# Patient Record
Sex: MALE | Race: WHITE | HISPANIC OR LATINO | Employment: FULL TIME | URBAN - METROPOLITAN AREA
[De-identification: names, ages, dates, MRNs, and addresses within clinical notes are randomized per-mention and may not be internally consistent; named-entity substitution may affect disease eponyms.]

---

## 2017-11-07 ENCOUNTER — HOSPITAL ENCOUNTER (OUTPATIENT)
Dept: RADIOLOGY | Facility: HOSPITAL | Age: 42
Discharge: HOME | End: 2017-11-07
Attending: PHYSICIAN ASSISTANT
Payer: COMMERCIAL

## 2017-11-07 ENCOUNTER — PATIENT MESSAGE (OUTPATIENT)
Dept: INTERNAL MEDICINE | Facility: CLINIC | Age: 42
End: 2017-11-07

## 2017-11-07 ENCOUNTER — OFFICE VISIT (OUTPATIENT)
Dept: INTERNAL MEDICINE | Facility: CLINIC | Age: 42
End: 2017-11-07
Payer: COMMERCIAL

## 2017-11-07 VITALS
WEIGHT: 227 LBS | BODY MASS INDEX: 31.78 KG/M2 | TEMPERATURE: 102 F | SYSTOLIC BLOOD PRESSURE: 122 MMHG | HEART RATE: 78 BPM | DIASTOLIC BLOOD PRESSURE: 70 MMHG | HEIGHT: 71 IN

## 2017-11-07 DIAGNOSIS — J10.1 INFLUENZA B: ICD-10-CM

## 2017-11-07 DIAGNOSIS — R50.9 FEVER, UNSPECIFIED FEVER CAUSE: Primary | ICD-10-CM

## 2017-11-07 DIAGNOSIS — R50.9 FEVER, UNSPECIFIED FEVER CAUSE: ICD-10-CM

## 2017-11-07 DIAGNOSIS — R79.81 ABNORMAL PULSE OXIMETRY: ICD-10-CM

## 2017-11-07 DIAGNOSIS — R05.9 COUGH: ICD-10-CM

## 2017-11-07 LAB
CTP QC/QA: YES
FLUAV AG NPH QL: NEGATIVE
FLUBV AG NPH QL: POSITIVE

## 2017-11-07 PROCEDURE — 99999 PR PBB SHADOW E&M-EST. PATIENT-LVL III: CPT | Mod: PBBFAC,,, | Performed by: PHYSICIAN ASSISTANT

## 2017-11-07 PROCEDURE — 71020 XR CHEST PA AND LATERAL: CPT | Mod: TC,PO

## 2017-11-07 PROCEDURE — 99214 OFFICE O/P EST MOD 30 MIN: CPT | Mod: S$GLB,,, | Performed by: PHYSICIAN ASSISTANT

## 2017-11-07 PROCEDURE — 71020 XR CHEST PA AND LATERAL: CPT | Mod: 26,,, | Performed by: RADIOLOGY

## 2017-11-07 PROCEDURE — 87804 INFLUENZA ASSAY W/OPTIC: CPT | Mod: QW,S$GLB,, | Performed by: PHYSICIAN ASSISTANT

## 2017-11-07 RX ORDER — OSELTAMIVIR PHOSPHATE 75 MG/1
75 CAPSULE ORAL 2 TIMES DAILY
Qty: 10 CAPSULE | Refills: 0 | Status: SHIPPED | OUTPATIENT
Start: 2017-11-07 | End: 2017-11-12

## 2017-11-07 RX ORDER — AZITHROMYCIN 250 MG/1
TABLET, FILM COATED ORAL
Qty: 6 TABLET | Refills: 0 | Status: SHIPPED | OUTPATIENT
Start: 2017-11-07 | End: 2022-07-22 | Stop reason: ALTCHOICE

## 2017-11-07 RX ORDER — BENZONATATE 200 MG/1
200 CAPSULE ORAL 3 TIMES DAILY PRN
Qty: 30 CAPSULE | Refills: 0 | Status: SHIPPED | OUTPATIENT
Start: 2017-11-07 | End: 2017-11-17

## 2017-11-07 NOTE — LETTER
November 7, 2017               [facility]  [facility]  [id number] [place] [address]  Phone: [phone]  Fax: [phone]   November 7, 2017     Patient: Migue George   YOB: 1975   Date of Visit: 11/7/2017       To Whom it May Concern:    Migue George was seen in my clinic on 11/7/2017. He may return to work on 11/09/2017.    If you have any questions or concerns, please don't hesitate to call.    Sincerely,         Margie Leung LPN

## 2017-11-08 NOTE — PROGRESS NOTES
Subjective:       Patient ID: Migue George is a 42 y.o. male.    Chief Complaint:   Influenza   This is a new problem. The current episode started yesterday. The problem occurs constantly. The problem has been unchanged. Associated symptoms include chills, congestion, coughing, fatigue, a fever (started last night while at work ) and a sore throat. Pertinent negatives include no abdominal pain, anorexia, arthralgias, change in bowel habit, chest pain, diaphoresis, headaches, joint swelling, myalgias, nausea, neck pain, numbness, rash, swollen glands, urinary symptoms, vertigo, visual change, vomiting or weakness. Nothing aggravates the symptoms. He has tried nothing for the symptoms.       Past Medical History:   Diagnosis Date    Depression     Hyperlipidemia        Current Outpatient Prescriptions   Medication Sig Dispense Refill    fenofibrate 160 MG Tab Take 1 tablet (160 mg total) by mouth once daily. 30 tablet 0    FEXOFENADINE HCL (ALLEGRA ORAL) Take by mouth.      levothyroxine (SYNTHROID) 150 MCG tablet Take 150 mcg by mouth once daily.      levothyroxine (SYNTHROID, LEVOTHROID) 175 MCG tablet Take 175 mcg by mouth once daily.      acyclovir (ZOVIRAX) 200 MG capsule Take 2 capsules (400 mg total) by mouth 2 (two) times daily. For fever blisters 20 capsule 0    azithromycin (Z-SHEELA) 250 MG tablet As per packet instructions 6 tablet 0    benzonatate (TESSALON) 200 MG capsule Take 1 capsule (200 mg total) by mouth 3 (three) times daily as needed for Cough. 30 capsule 0    oseltamivir (TAMIFLU) 75 MG capsule Take 1 capsule (75 mg total) by mouth 2 (two) times daily. 10 capsule 0    venlafaxine (EFFEXOR-XR) 150 MG Cp24 Take 1 capsule (150 mg total) by mouth once daily. 30 capsule 11     No current facility-administered medications for this visit.        Review of Systems   Constitutional: Positive for chills, fatigue and fever (started last night while at work ). Negative for diaphoresis.  "  HENT: Positive for congestion and sore throat.    Respiratory: Positive for cough.    Cardiovascular: Negative for chest pain.   Gastrointestinal: Negative for abdominal pain, anorexia, change in bowel habit, nausea and vomiting.   Musculoskeletal: Negative for arthralgias, joint swelling, myalgias and neck pain.   Skin: Negative for rash.   Neurological: Negative for vertigo, weakness, numbness and headaches.       Objective:   /70   Pulse 78   Temp (!) 101.5 °F (38.6 °C)   Ht 5' 10.5" (1.791 m)   Wt 103 kg (227 lb)   BMI 32.11 kg/m²    pulse O2 94-95%  Physical Exam   Constitutional: He is oriented to person, place, and time. He appears well-developed and well-nourished. No distress.   HENT:   Head: Normocephalic and atraumatic.   Right Ear: Hearing, tympanic membrane, external ear and ear canal normal.   Left Ear: Hearing, tympanic membrane, external ear and ear canal normal.   Nose: Nose normal.   Mouth/Throat: Uvula is midline and mucous membranes are normal. Posterior oropharyngeal erythema present.   Eyes: Conjunctivae and EOM are normal. Pupils are equal, round, and reactive to light.   Neck: Normal range of motion. No thyromegaly present.   Cardiovascular: Normal rate, regular rhythm, normal heart sounds and intact distal pulses.    Pulmonary/Chest: Effort normal and breath sounds normal.   Lymphadenopathy:     He has no cervical adenopathy.   Neurological: He is alert and oriented to person, place, and time.         Lab Results   Component Value Date    WBC 4.98 08/12/2014    HGB 14.0 08/12/2014    HCT 40.6 08/12/2014     08/12/2014    CHOL 226 (H) 02/23/2015    TRIG 316 (H) 02/23/2015    HDL 41 02/23/2015    ALT 29 02/23/2015    AST 18 02/23/2015     02/23/2015    K 4.4 02/23/2015     02/23/2015    CREATININE 1.0 02/23/2015    BUN 15 02/23/2015    CO2 23 02/23/2015    TSH 1.757 02/23/2015    HGBA1C 5.4 08/12/2014       Assessment:       1. Fever, unspecified fever cause  "   2. Cough    3. Influenza B    4. Abnormal pulse oximetry        Plan:   Fever, unspecified fever cause  -     POCT Influenza A/B  -     X-Ray Chest PA And Lateral; Future; Expected date: 11/07/2017    Cough  -     benzonatate (TESSALON) 200 MG capsule; Take 1 capsule (200 mg total) by mouth 3 (three) times daily as needed for Cough.  Dispense: 30 capsule; Refill: 0  Influ-     oseltamivir (TAMIFLU) 75 MG capsule; Take 1 capsule (75 mg total) by mouth 2 (two) times daily.  Dispense: 10 capsule; Refill: 0enza B    Abnormal pulse oximetry    Other orders  -     benzonatate (TESSALON) 200 MG capsule; Take 1 capsule (200 mg total) by mouth 3 (three) times daily as needed for Cough.  Dispense: 30 capsule; Refill: 0    -     azithromycin (Z-SHEELA) 250 MG tablet; As per packet instructions  Dispense: 6 tablet; Refill: 0    not good inspriation on xray, pulse o2 a little low. Had FAINT line for influenza B so will treat however also want to cover early CAP   Follow up if no improvement

## 2017-11-09 ENCOUNTER — PATIENT MESSAGE (OUTPATIENT)
Dept: INTERNAL MEDICINE | Facility: CLINIC | Age: 42
End: 2017-11-09

## 2019-09-17 ENCOUNTER — HOSPITAL ENCOUNTER (OUTPATIENT)
Dept: RADIOLOGY | Facility: HOSPITAL | Age: 44
Discharge: HOME | End: 2019-09-17
Attending: PHYSICIAN ASSISTANT
Payer: COMMERCIAL

## 2019-09-17 ENCOUNTER — OFFICE VISIT (OUTPATIENT)
Dept: URGENT CARE | Facility: CLINIC | Age: 44
End: 2019-09-17
Payer: COMMERCIAL

## 2019-09-17 VITALS
OXYGEN SATURATION: 98 % | RESPIRATION RATE: 18 BRPM | SYSTOLIC BLOOD PRESSURE: 140 MMHG | DIASTOLIC BLOOD PRESSURE: 74 MMHG | BODY MASS INDEX: 33.6 KG/M2 | HEART RATE: 70 BPM | TEMPERATURE: 98 F | HEIGHT: 71 IN | WEIGHT: 240 LBS

## 2019-09-17 DIAGNOSIS — R07.81 RIB PAIN ON LEFT SIDE: ICD-10-CM

## 2019-09-17 DIAGNOSIS — R10.9 FLANK PAIN: ICD-10-CM

## 2019-09-17 DIAGNOSIS — R07.81 RIB PAIN ON LEFT SIDE: Primary | ICD-10-CM

## 2019-09-17 LAB
BILIRUB SERPL-MCNC: NORMAL MG/DL
BLOOD URINE, POC: NORMAL
COLOR, POC UA: YELLOW
GLUCOSE UR QL STRIP: NORMAL
KETONES UR QL STRIP: NORMAL
LEUKOCYTE ESTERASE URINE, POC: NORMAL
NITRITE, POC UA: NORMAL
PH, POC UA: 5
PROTEIN, POC: NORMAL
SPECIFIC GRAVITY, POC UA: 1.02
UROBILINOGEN, POC UA: NORMAL

## 2019-09-17 PROCEDURE — 71100 X-RAY EXAM RIBS UNI 2 VIEWS: CPT | Mod: TC,FY,PO,LT

## 2019-09-17 PROCEDURE — 99214 PR OFFICE/OUTPT VISIT, EST, LEVL IV, 30-39 MIN: ICD-10-PCS | Mod: S$GLB,,, | Performed by: PHYSICIAN ASSISTANT

## 2019-09-17 PROCEDURE — 99999 PR PBB SHADOW E&M-EST. PATIENT-LVL V: ICD-10-PCS | Mod: PBBFAC,,, | Performed by: PHYSICIAN ASSISTANT

## 2019-09-17 PROCEDURE — 81001 POCT URINALYSIS, DIPSTICK OR TABLET REAGENT, AUTOMATED, WITH MICROSCOP: ICD-10-PCS | Mod: S$GLB,,, | Performed by: PHYSICIAN ASSISTANT

## 2019-09-17 PROCEDURE — 81001 URINALYSIS AUTO W/SCOPE: CPT | Mod: S$GLB,,, | Performed by: PHYSICIAN ASSISTANT

## 2019-09-17 PROCEDURE — 3078F DIAST BP <80 MM HG: CPT | Mod: CPTII,S$GLB,, | Performed by: PHYSICIAN ASSISTANT

## 2019-09-17 PROCEDURE — 3008F BODY MASS INDEX DOCD: CPT | Mod: CPTII,S$GLB,, | Performed by: PHYSICIAN ASSISTANT

## 2019-09-17 PROCEDURE — 3077F SYST BP >= 140 MM HG: CPT | Mod: CPTII,S$GLB,, | Performed by: PHYSICIAN ASSISTANT

## 2019-09-17 PROCEDURE — 99999 PR PBB SHADOW E&M-EST. PATIENT-LVL V: CPT | Mod: PBBFAC,,, | Performed by: PHYSICIAN ASSISTANT

## 2019-09-17 PROCEDURE — 99214 OFFICE O/P EST MOD 30 MIN: CPT | Mod: S$GLB,,, | Performed by: PHYSICIAN ASSISTANT

## 2019-09-17 PROCEDURE — 71100 XR RIBS 2 VIEW LEFT: ICD-10-PCS | Mod: 26,LT,, | Performed by: RADIOLOGY

## 2019-09-17 PROCEDURE — 74018 RADEX ABDOMEN 1 VIEW: CPT | Mod: TC,FY,PO

## 2019-09-17 PROCEDURE — 3078F PR MOST RECENT DIASTOLIC BLOOD PRESSURE < 80 MM HG: ICD-10-PCS | Mod: CPTII,S$GLB,, | Performed by: PHYSICIAN ASSISTANT

## 2019-09-17 PROCEDURE — 74018 XR ABDOMEN AP 1 VIEW: ICD-10-PCS | Mod: 26,,, | Performed by: RADIOLOGY

## 2019-09-17 PROCEDURE — 3008F PR BODY MASS INDEX (BMI) DOCUMENTED: ICD-10-PCS | Mod: CPTII,S$GLB,, | Performed by: PHYSICIAN ASSISTANT

## 2019-09-17 PROCEDURE — 3077F PR MOST RECENT SYSTOLIC BLOOD PRESSURE >= 140 MM HG: ICD-10-PCS | Mod: CPTII,S$GLB,, | Performed by: PHYSICIAN ASSISTANT

## 2019-09-17 PROCEDURE — 71100 X-RAY EXAM RIBS UNI 2 VIEWS: CPT | Mod: 26,LT,, | Performed by: RADIOLOGY

## 2019-09-17 PROCEDURE — 74018 RADEX ABDOMEN 1 VIEW: CPT | Mod: 26,,, | Performed by: RADIOLOGY

## 2019-09-17 NOTE — PATIENT INSTRUCTIONS
Recommended rest (no heavy lifting), tylenol 1000 mg/ibuprofen 600 mg for pain, ice   Increase fiber, fluids, miralax/stool softener for constipation   Follow-up with primary care physician if no improvement in symptoms in next week or if develop new/worsening symptoms

## 2019-09-17 NOTE — PROGRESS NOTES
"Subjective:      Patient ID: Migue George is a 44 y.o. male.    Chief Complaint: left rib pain (3-4 days )    Migue is a 44 year old male who presents to urgent care with left sided rib pain that began a few days ago.  The pain is worse with coughing or taking deep breaths.  It is also worst with movement.  He has taken over the counter tylenol/naproxen for the pain.  He has recently has some sinus pressure/congestion, but denies much of a cough.  He does not recall an injury or pulling a muscle.  He has no history of kidney stones and has not seen any blood in his urine.      Review of Systems   Constitutional: Negative for chills, diaphoresis, fatigue and fever.   Respiratory: Negative for cough, shortness of breath and wheezing.    Gastrointestinal: Negative for nausea and vomiting.   Genitourinary: Negative for dysuria, flank pain, frequency, hematuria and urgency.   Musculoskeletal:        Left sided rib pain/upper left sided thoracic pain        Objective:   BP (!) 140/74   Pulse 70   Temp 98.4 °F (36.9 °C) (Tympanic)   Resp 18   Ht 5' 11" (1.803 m)   Wt 108.9 kg (240 lb)   SpO2 98%   BMI 33.47 kg/m²   Physical Exam   Constitutional: He is oriented to person, place, and time. He appears well-developed and well-nourished. No distress.   HENT:   Head: Normocephalic and atraumatic.   Right Ear: External ear normal.   Left Ear: External ear normal.   Eyes: Conjunctivae and EOM are normal.   Neck: Normal range of motion.   Cardiovascular: Normal rate.   Pulmonary/Chest: Effort normal and breath sounds normal. No respiratory distress. He has no decreased breath sounds. He has no wheezes. He has no rhonchi.           Left sided upper back tenderness/rib tenderness    Musculoskeletal: Normal range of motion.   Neurological: He is alert and oriented to person, place, and time. He exhibits normal muscle tone.   Skin: Skin is warm and dry. He is not diaphoretic.   Psychiatric: He has a normal mood and " affect. His behavior is normal.   Vitals reviewed.    Assessment:      1. Rib pain on left side    2. Flank pain       Plan:   Rib pain on left side  -     X-Ray Ribs 2 View Left; Future; Expected date: 09/17/2019    Flank pain  -     POCT urinalysis, dipstick or tablet reag  -     Cancel: X-Ray Abdomen Flat And Erect; Future; Expected date: 09/17/2019    Left Sided Rib Pain    -  Rib x-ray - no acute fracture/dislocation    -  Urinalysis with no blood and KUB with no evidence of kidney stone - but stool burden    -  Recommended rest (no heavy lifting), tylenol 1000 mg/ibuprofen 600 mg for pain, ice for left rib pain   -  Increase fiber, fluids, miralax/stool softener for constipation    -  Follow-up with primary care physician if no improvement in symptoms in next week or if develop new/worsening symptoms     AVS provided and instructions reviewed with patient. Patient was counseled on supportive care and instructed to return or contact primary care provider if condition does not improve or for any new or worsening symptoms.    Silvana Silva PA-C   Physician Assistant   [facility]

## 2020-05-18 ENCOUNTER — HOSPITAL ENCOUNTER (OUTPATIENT)
Dept: RADIOLOGY | Facility: CLINIC | Age: 45
Discharge: HOME | End: 2020-05-18
Attending: NURSE PRACTITIONER
Payer: COMMERCIAL

## 2020-05-18 ENCOUNTER — OFFICE VISIT (OUTPATIENT)
Dept: URGENT CARE | Facility: CLINIC | Age: 45
End: 2020-05-18
Payer: COMMERCIAL

## 2020-05-18 VITALS
WEIGHT: 240 LBS | BODY MASS INDEX: 33.6 KG/M2 | HEART RATE: 95 BPM | TEMPERATURE: 99 F | RESPIRATION RATE: 20 BRPM | OXYGEN SATURATION: 97 % | HEIGHT: 71 IN

## 2020-05-18 DIAGNOSIS — R10.32 ACUTE LEFT LOWER QUADRANT PAIN: ICD-10-CM

## 2020-05-18 DIAGNOSIS — R10.32 ACUTE LEFT LOWER QUADRANT PAIN: Primary | ICD-10-CM

## 2020-05-18 DIAGNOSIS — Z87.19 HISTORY OF DIVERTICULITIS: ICD-10-CM

## 2020-05-18 DIAGNOSIS — R11.0 NAUSEA: ICD-10-CM

## 2020-05-18 PROCEDURE — 74019 RADEX ABDOMEN 2 VIEWS: CPT | Mod: S$GLB,,, | Performed by: RADIOLOGY

## 2020-05-18 PROCEDURE — 74019 XR ABDOMEN FLAT AND ERECT: ICD-10-PCS | Mod: S$GLB,,, | Performed by: RADIOLOGY

## 2020-05-18 PROCEDURE — 99214 PR OFFICE/OUTPT VISIT, EST, LEVL IV, 30-39 MIN: ICD-10-PCS | Mod: S$GLB,,, | Performed by: NURSE PRACTITIONER

## 2020-05-18 PROCEDURE — 99214 OFFICE O/P EST MOD 30 MIN: CPT | Mod: S$GLB,,, | Performed by: NURSE PRACTITIONER

## 2020-05-18 RX ORDER — AMOXICILLIN AND CLAVULANATE POTASSIUM 875; 125 MG/1; MG/1
1 TABLET, FILM COATED ORAL 2 TIMES DAILY
Qty: 14 TABLET | Refills: 0 | Status: SHIPPED | OUTPATIENT
Start: 2020-05-18 | End: 2020-05-25

## 2020-05-18 RX ORDER — METFORMIN HYDROCHLORIDE 500 MG/1
TABLET ORAL
COMMUNITY
Start: 2020-03-17 | End: 2022-07-22 | Stop reason: ALTCHOICE

## 2020-05-18 RX ORDER — ONDANSETRON 8 MG/1
8 TABLET, ORALLY DISINTEGRATING ORAL EVERY 8 HOURS PRN
Qty: 12 TABLET | Refills: 0 | Status: SHIPPED | OUTPATIENT
Start: 2020-05-18 | End: 2020-05-22

## 2020-05-18 NOTE — PATIENT INSTRUCTIONS
Increase fluid intake.  Take antibiotics and nausea medicine as prescribed.  Increase your fiber intake.  Follow up with your primary care physician if symptoms persist or worsen.    Understanding Diverticulosis and Diverticulitis     Pouches or diverticula usually occur in the lower part of the colon called the sigmoid.     The colon (large intestine) is the last part of the digestive tract. It absorbs water from stool and changes it from a liquid to a solid. In certain cases, small pouches called diverticula can form in the colon wall. This condition is called diverticulosis. The pouches can become infected. If this happens, it becomes a more serious problem called diverticulitis. These problems can be painful. But they can be managed.  Managing your condition  Diet changes or medicines may be prescribed.   If you have diverticulosis  Recommendations include:  · Diet changes are often enough to control symptoms. The main changes are adding fiber (roughage) and drinking more water. Fiber absorbs water as it travels through your colon. This helps your stool stay soft and move smoothly. Water helps this process.  · If needed, you may be told to take over-the-counter stool softeners.  · To help relieve pain, antispasmodic medicines may be prescribed.  · Watch for changes in your bowel movements. Tell the healthcare provider if you notice any changes.  · Begin an exercise program. Ask your healthcare provider how to get started.  · Get plenty of rest and sleep.   If you have diverticulitis  Treatment depends on how bad your symptoms are.  · For mild symptoms. You may be put on a liquid diet for a short time. Antibiotics are usually prescribed. If these two steps relieve your symptoms, you may then be prescribed a high-fiber diet. If you still have symptoms, your healthcare provider will discuss more treatment choices with you.  · For severe symptoms. You may need to be admitted to the hospital. There, you can be  given IV antibiotics and fluids. You will also be put on a low-fiber or liquid diet. Although not common, surgery is needed in some people with severe symptoms.  [place] to colon health     Diverticulitis occurs when the pouches become infected or inflamed.     Help keep your colon healthy with a diet that includes plenty of high-fiber fruits, vegetables, and whole grains. Drink plenty of liquids like water and juice. Maintain a healthy lifestyle including regular exercise, stress management, and adequate rest and sleep.   Date Last Reviewed: 7/1/2016  © 1359-7486 [employer]. [address] [place] [id number]. All rights reserved. This information is not intended as a substitute for professional medical care. Always follow your healthcare professional's instructions.

## 2020-05-18 NOTE — PROGRESS NOTES
"Subjective:       Patient ID: Migue George is a 44 y.o. male.    Vitals:  height is 5' 11" (1.803 m) and weight is 108.9 kg (240 lb). His temperature is 98.9 °F (37.2 °C). His pulse is 95. His respiration is 20 and oxygen saturation is 97%.     Chief Complaint: Abdominal Pain    Patient presented with c/o left lower abdominal pain for the past 4-5 days that has progressively gotten worse. Patient states having diarrhea 4 days ago and episodes of hard stool and feeling constipated for the past 3 days. Denies blood and mucous in the stool, fever, and vomiting. Admits having occasional nausea. Pt states he has a history of diverticulitis.    Abdominal Pain   This is a new problem. The current episode started in the past 7 days (Saturday Night). The onset quality is sudden. The problem occurs intermittently. The problem has been gradually worsening. The pain is located in the left flank. The pain is at a severity of 8/10. The pain is moderate. The quality of the pain is cramping and aching. Associated symptoms include belching, flatus and nausea. Pertinent negatives include no constipation, diarrhea, dysuria, fever or vomiting. The pain is aggravated by palpation, movement, coughing, deep breathing and belching. The pain is relieved by being still, certain positions and recumbency. He has tried acetaminophen for the symptoms. The treatment provided mild relief. There is no history of abdominal surgery.       Constitution: Negative for appetite change, chills, sweating and fever.   Cardiovascular: Negative for chest pain.   Respiratory: Negative for shortness of breath.    Gastrointestinal: Positive for abdominal pain and nausea. Negative for abdominal trauma, abdominal bloating, history of abdominal surgery, vomiting, constipation, diarrhea, dark colored stools and heartburn.   Genitourinary: Negative for dysuria and pelvic pain.   Musculoskeletal: Negative for back pain.       Objective:      Physical Exam "   Constitutional: He is oriented to person, place, and time. He appears well-developed and well-nourished. He is cooperative.   HENT:   Head: Normocephalic and atraumatic.   Right Ear: External ear normal.   Left Ear: External ear normal.   Nose: Nose normal.   Mouth/Throat: Mucous membranes are normal.   Eyes: Conjunctivae and lids are normal.   Neck: Trachea normal and full passive range of motion without pain. Neck supple.   Cardiovascular: Normal rate.   Pulmonary/Chest: Effort normal. No respiratory distress.   Abdominal: Soft. Normal appearance and bowel sounds are normal. He exhibits no distension, no abdominal bruit, no pulsatile midline mass and no mass. There is tenderness in the left lower quadrant. There is no rigidity.   Musculoskeletal: Normal range of motion. He exhibits no edema.   Neurological: He is alert and oriented to person, place, and time. He has normal strength.   Skin: Skin is warm, dry, intact, not diaphoretic and not pale.   Psychiatric: He has a normal mood and affect. His speech is normal and behavior is normal. Judgment and thought content normal. Cognition and memory are normal.   Nursing note and vitals reviewed.        Assessment:       1. Acute left lower quadrant pain    2. History of diverticulitis        Plan:         Acute left lower quadrant pain  -     X-Ray Abdomen Flat And Erect; Future; Expected date: 05/18/2020    History of diverticulitis  -     X-Ray Abdomen Flat And Erect; Future; Expected date: 05/18/2020         X-ray Abdomen Flat And Erect    Result Date: 5/18/2020  EXAMINATION: XR ABDOMEN FLAT AND ERECT CLINICAL HISTORY: Left lower quadrant pain TECHNIQUE: Flat and erect AP views of the abdomen were performed. COMPARISON: September 17, 2019 FINDINGS: The intestinal gas pattern is normal.  The right hemidiaphragm is slightly beyond the field-of-view of the upright image, but there is no obvious free air.  No small bowel air-fluid levels are identified.  There are  surgical clips overlying the left iliac bone as previously.  Visualized osseous structures are stable.  No definite abnormal calcifications are seen.  There may be some abnormal opacification at the lung bases, but this would be better evaluated on chest radiographs if clinically indicated.     As above. Electronically signed by: Nakia Ortega MD Date:    05/18/2020 Time:    18:29    Xray results reviewed and discussed with patient.    Increase fluid intake.  Take antibiotics and nausea medicine as prescribed.  Increase your fiber intake.  Follow up with your primary care physician if symptoms persist or worsen.

## 2020-05-20 ENCOUNTER — TELEPHONE (OUTPATIENT)
Dept: URGENT CARE | Facility: CLINIC | Age: 45
End: 2020-05-20

## 2021-07-09 ENCOUNTER — OFFICE VISIT (OUTPATIENT)
Dept: URGENT CARE | Facility: CLINIC | Age: 46
End: 2021-07-09
Payer: COMMERCIAL

## 2021-07-09 ENCOUNTER — NURSE TRIAGE (OUTPATIENT)
Dept: ADMINISTRATIVE | Facility: CLINIC | Age: 46
End: 2021-07-09

## 2021-07-09 ENCOUNTER — TELEPHONE (OUTPATIENT)
Dept: INTERNAL MEDICINE | Facility: CLINIC | Age: 46
End: 2021-07-09

## 2021-07-09 ENCOUNTER — PATIENT MESSAGE (OUTPATIENT)
Dept: ADMINISTRATIVE | Facility: CLINIC | Age: 46
End: 2021-07-09

## 2021-07-09 VITALS
SYSTOLIC BLOOD PRESSURE: 115 MMHG | TEMPERATURE: 98 F | HEIGHT: 71 IN | DIASTOLIC BLOOD PRESSURE: 66 MMHG | HEART RATE: 96 BPM | BODY MASS INDEX: 30.8 KG/M2 | OXYGEN SATURATION: 96 % | WEIGHT: 220 LBS

## 2021-07-09 DIAGNOSIS — U07.1 COVID-19 VIRUS INFECTION: Primary | ICD-10-CM

## 2021-07-09 DIAGNOSIS — R05.9 COUGH: ICD-10-CM

## 2021-07-09 LAB
CTP QC/QA: YES
SARS-COV-2 RDRP RESP QL NAA+PROBE: POSITIVE

## 2021-07-09 PROCEDURE — 3008F BODY MASS INDEX DOCD: CPT | Mod: CPTII,S$GLB,, | Performed by: PHYSICIAN ASSISTANT

## 2021-07-09 PROCEDURE — 3008F PR BODY MASS INDEX (BMI) DOCUMENTED: ICD-10-PCS | Mod: CPTII,S$GLB,, | Performed by: PHYSICIAN ASSISTANT

## 2021-07-09 PROCEDURE — 1126F PR PAIN SEVERITY QUANTIFIED, NO PAIN PRESENT: ICD-10-PCS | Mod: S$GLB,,, | Performed by: PHYSICIAN ASSISTANT

## 2021-07-09 PROCEDURE — 99214 PR OFFICE/OUTPT VISIT, EST, LEVL IV, 30-39 MIN: ICD-10-PCS | Mod: S$GLB,,, | Performed by: PHYSICIAN ASSISTANT

## 2021-07-09 PROCEDURE — 99214 OFFICE O/P EST MOD 30 MIN: CPT | Mod: S$GLB,,, | Performed by: PHYSICIAN ASSISTANT

## 2021-07-09 PROCEDURE — U0002: ICD-10-PCS | Mod: QW,S$GLB,, | Performed by: PHYSICIAN ASSISTANT

## 2021-07-09 PROCEDURE — 1126F AMNT PAIN NOTED NONE PRSNT: CPT | Mod: S$GLB,,, | Performed by: PHYSICIAN ASSISTANT

## 2021-07-09 PROCEDURE — U0002 COVID-19 LAB TEST NON-CDC: HCPCS | Mod: QW,S$GLB,, | Performed by: PHYSICIAN ASSISTANT

## 2021-07-09 RX ORDER — ATORVASTATIN CALCIUM 20 MG/1
TABLET, FILM COATED ORAL
COMMUNITY
Start: 2021-03-19 | End: 2022-07-22 | Stop reason: SDUPTHER

## 2021-07-10 ENCOUNTER — NURSE TRIAGE (OUTPATIENT)
Dept: ADMINISTRATIVE | Facility: CLINIC | Age: 46
End: 2021-07-10

## 2021-07-10 ENCOUNTER — PATIENT MESSAGE (OUTPATIENT)
Dept: INTERNAL MEDICINE | Facility: CLINIC | Age: 46
End: 2021-07-10

## 2021-07-11 ENCOUNTER — NURSE TRIAGE (OUTPATIENT)
Dept: ADMINISTRATIVE | Facility: CLINIC | Age: 46
End: 2021-07-11

## 2021-07-11 ENCOUNTER — TELEPHONE (OUTPATIENT)
Dept: INFECTIOUS DISEASES | Facility: HOSPITAL | Age: 46
End: 2021-07-11

## 2021-07-11 ENCOUNTER — PATIENT MESSAGE (OUTPATIENT)
Dept: INFECTIOUS DISEASES | Facility: HOSPITAL | Age: 46
End: 2021-07-11

## 2021-07-13 ENCOUNTER — PATIENT MESSAGE (OUTPATIENT)
Dept: INTERNAL MEDICINE | Facility: CLINIC | Age: 46
End: 2021-07-13

## 2021-07-13 ENCOUNTER — PATIENT MESSAGE (OUTPATIENT)
Dept: INFECTIOUS DISEASES | Facility: HOSPITAL | Age: 46
End: 2021-07-13

## 2021-07-16 ENCOUNTER — INFUSION (OUTPATIENT)
Dept: INFECTIOUS DISEASES | Facility: HOSPITAL | Age: 46
End: 2021-07-16
Attending: INTERNAL MEDICINE
Payer: COMMERCIAL

## 2021-07-16 VITALS
HEART RATE: 77 BPM | DIASTOLIC BLOOD PRESSURE: 58 MMHG | TEMPERATURE: 98 F | RESPIRATION RATE: 18 BRPM | OXYGEN SATURATION: 97 % | SYSTOLIC BLOOD PRESSURE: 122 MMHG

## 2021-07-16 DIAGNOSIS — U07.1 COVID-19 VIRUS INFECTION: ICD-10-CM

## 2021-07-16 PROCEDURE — M0243 CASIRIVI AND IMDEVI INFUSION: HCPCS | Performed by: INTERNAL MEDICINE

## 2021-07-16 PROCEDURE — 63600175 PHARM REV CODE 636 W HCPCS: Performed by: INTERNAL MEDICINE

## 2021-07-16 PROCEDURE — 25000003 PHARM REV CODE 250: Performed by: INTERNAL MEDICINE

## 2021-07-16 RX ORDER — ONDANSETRON 4 MG/1
4 TABLET, ORALLY DISINTEGRATING ORAL ONCE AS NEEDED
Status: DISCONTINUED | OUTPATIENT
Start: 2021-07-16 | End: 2022-07-22 | Stop reason: ALTCHOICE

## 2021-07-16 RX ORDER — ACETAMINOPHEN 325 MG/1
650 TABLET ORAL ONCE AS NEEDED
Status: DISCONTINUED | OUTPATIENT
Start: 2021-07-16 | End: 2022-07-22 | Stop reason: ALTCHOICE

## 2021-07-16 RX ORDER — DIPHENHYDRAMINE HYDROCHLORIDE 50 MG/ML
25 INJECTION INTRAMUSCULAR; INTRAVENOUS ONCE AS NEEDED
Status: DISCONTINUED | OUTPATIENT
Start: 2021-07-16 | End: 2021-11-13

## 2021-07-16 RX ORDER — SODIUM CHLORIDE 0.9 % (FLUSH) 0.9 %
10 SYRINGE (ML) INJECTION
Status: DISCONTINUED | OUTPATIENT
Start: 2021-07-16 | End: 2022-07-22

## 2021-07-16 RX ORDER — ALBUTEROL SULFATE 90 UG/1
2 AEROSOL, METERED RESPIRATORY (INHALATION)
Status: DISCONTINUED | OUTPATIENT
Start: 2021-07-16 | End: 2022-07-22 | Stop reason: ALTCHOICE

## 2021-07-16 RX ORDER — EPINEPHRINE 0.3 MG/.3ML
0.3 INJECTION SUBCUTANEOUS
Status: DISCONTINUED | OUTPATIENT
Start: 2021-07-16 | End: 2022-07-22 | Stop reason: ALTCHOICE

## 2021-07-16 RX ADMIN — CASIRIVIMAB 600 MG: 1332 INJECTION, SOLUTION, CONCENTRATE INTRAVENOUS at 10:07

## 2021-11-13 ENCOUNTER — OFFICE VISIT (OUTPATIENT)
Dept: URGENT CARE | Facility: CLINIC | Age: 46
End: 2021-11-13
Payer: COMMERCIAL

## 2021-11-13 VITALS
SYSTOLIC BLOOD PRESSURE: 136 MMHG | TEMPERATURE: 100 F | HEART RATE: 92 BPM | RESPIRATION RATE: 18 BRPM | OXYGEN SATURATION: 96 % | WEIGHT: 212 LBS | BODY MASS INDEX: 29.68 KG/M2 | HEIGHT: 71 IN | DIASTOLIC BLOOD PRESSURE: 63 MMHG

## 2021-11-13 DIAGNOSIS — Z20.822 SUSPECTED COVID-19 VIRUS INFECTION: ICD-10-CM

## 2021-11-13 DIAGNOSIS — J02.9 PHARYNGITIS, UNSPECIFIED ETIOLOGY: Primary | ICD-10-CM

## 2021-11-13 DIAGNOSIS — K12.0 APHTHOUS ULCER: ICD-10-CM

## 2021-11-13 LAB
CTP QC/QA: YES
CTP QC/QA: YES
MOLECULAR STREP A: NEGATIVE
SARS-COV-2 RDRP RESP QL NAA+PROBE: NEGATIVE

## 2021-11-13 PROCEDURE — 3075F PR MOST RECENT SYSTOLIC BLOOD PRESS GE 130-139MM HG: ICD-10-PCS | Mod: CPTII,S$GLB,, | Performed by: NURSE PRACTITIONER

## 2021-11-13 PROCEDURE — 1159F MED LIST DOCD IN RCRD: CPT | Mod: CPTII,S$GLB,, | Performed by: NURSE PRACTITIONER

## 2021-11-13 PROCEDURE — 87651 POCT STREP A MOLECULAR: ICD-10-PCS | Mod: QW,S$GLB,, | Performed by: NURSE PRACTITIONER

## 2021-11-13 PROCEDURE — U0002: ICD-10-PCS | Mod: QW,S$GLB,, | Performed by: NURSE PRACTITIONER

## 2021-11-13 PROCEDURE — 3075F SYST BP GE 130 - 139MM HG: CPT | Mod: CPTII,S$GLB,, | Performed by: NURSE PRACTITIONER

## 2021-11-13 PROCEDURE — 99214 OFFICE O/P EST MOD 30 MIN: CPT | Mod: S$GLB,,, | Performed by: NURSE PRACTITIONER

## 2021-11-13 PROCEDURE — 3008F PR BODY MASS INDEX (BMI) DOCUMENTED: ICD-10-PCS | Mod: CPTII,S$GLB,, | Performed by: NURSE PRACTITIONER

## 2021-11-13 PROCEDURE — U0002 COVID-19 LAB TEST NON-CDC: HCPCS | Mod: QW,S$GLB,, | Performed by: NURSE PRACTITIONER

## 2021-11-13 PROCEDURE — 99214 PR OFFICE/OUTPT VISIT, EST, LEVL IV, 30-39 MIN: ICD-10-PCS | Mod: S$GLB,,, | Performed by: NURSE PRACTITIONER

## 2021-11-13 PROCEDURE — 1160F RVW MEDS BY RX/DR IN RCRD: CPT | Mod: CPTII,S$GLB,, | Performed by: NURSE PRACTITIONER

## 2021-11-13 PROCEDURE — 87651 STREP A DNA AMP PROBE: CPT | Mod: QW,S$GLB,, | Performed by: NURSE PRACTITIONER

## 2021-11-13 PROCEDURE — 1159F PR MEDICATION LIST DOCUMENTED IN MEDICAL RECORD: ICD-10-PCS | Mod: CPTII,S$GLB,, | Performed by: NURSE PRACTITIONER

## 2021-11-13 PROCEDURE — 3008F BODY MASS INDEX DOCD: CPT | Mod: CPTII,S$GLB,, | Performed by: NURSE PRACTITIONER

## 2021-11-13 PROCEDURE — 3078F PR MOST RECENT DIASTOLIC BLOOD PRESSURE < 80 MM HG: ICD-10-PCS | Mod: CPTII,S$GLB,, | Performed by: NURSE PRACTITIONER

## 2021-11-13 PROCEDURE — 3078F DIAST BP <80 MM HG: CPT | Mod: CPTII,S$GLB,, | Performed by: NURSE PRACTITIONER

## 2021-11-13 PROCEDURE — 1160F PR REVIEW ALL MEDS BY PRESCRIBER/CLIN PHARMACIST DOCUMENTED: ICD-10-PCS | Mod: CPTII,S$GLB,, | Performed by: NURSE PRACTITIONER

## 2021-11-13 RX ORDER — BENZONATATE 100 MG/1
200 CAPSULE ORAL 3 TIMES DAILY PRN
Qty: 60 CAPSULE | Refills: 1 | Status: SHIPPED | OUTPATIENT
Start: 2021-11-13 | End: 2022-07-22 | Stop reason: ALTCHOICE

## 2021-11-13 RX ORDER — FLUTICASONE PROPIONATE 50 MCG
2 SPRAY, SUSPENSION (ML) NASAL DAILY
Qty: 16 G | Refills: 0 | Status: SHIPPED | OUTPATIENT
Start: 2021-11-13 | End: 2022-07-22 | Stop reason: ALTCHOICE

## 2021-11-16 ENCOUNTER — TELEPHONE (OUTPATIENT)
Dept: URGENT CARE | Facility: CLINIC | Age: 46
End: 2021-11-16
Payer: COMMERCIAL

## 2022-03-23 ENCOUNTER — PATIENT MESSAGE (OUTPATIENT)
Dept: RESEARCH | Facility: HOSPITAL | Age: 47
End: 2022-03-23
Payer: COMMERCIAL

## 2022-06-28 ENCOUNTER — OFFICE VISIT (OUTPATIENT)
Dept: URGENT CARE | Facility: CLINIC | Age: 47
End: 2022-06-28
Payer: COMMERCIAL

## 2022-06-28 VITALS
WEIGHT: 230 LBS | SYSTOLIC BLOOD PRESSURE: 141 MMHG | DIASTOLIC BLOOD PRESSURE: 72 MMHG | OXYGEN SATURATION: 97 % | BODY MASS INDEX: 32.2 KG/M2 | RESPIRATION RATE: 18 BRPM | HEART RATE: 110 BPM | TEMPERATURE: 100 F | HEIGHT: 71 IN

## 2022-06-28 DIAGNOSIS — R11.0 NAUSEA: ICD-10-CM

## 2022-06-28 DIAGNOSIS — A08.4 VIRAL GASTROENTERITIS: Primary | ICD-10-CM

## 2022-06-28 DIAGNOSIS — R68.83 CHILLS: ICD-10-CM

## 2022-06-28 DIAGNOSIS — R19.7 DIARRHEA, UNSPECIFIED TYPE: ICD-10-CM

## 2022-06-28 LAB
CTP QC/QA: YES
SARS-COV-2 RDRP RESP QL NAA+PROBE: NEGATIVE

## 2022-06-28 PROCEDURE — U0002: ICD-10-PCS | Mod: QW,S$GLB,, | Performed by: PHYSICIAN ASSISTANT

## 2022-06-28 PROCEDURE — 3078F DIAST BP <80 MM HG: CPT | Mod: CPTII,S$GLB,, | Performed by: PHYSICIAN ASSISTANT

## 2022-06-28 PROCEDURE — 1160F RVW MEDS BY RX/DR IN RCRD: CPT | Mod: CPTII,S$GLB,, | Performed by: PHYSICIAN ASSISTANT

## 2022-06-28 PROCEDURE — 99213 OFFICE O/P EST LOW 20 MIN: CPT | Mod: S$GLB,,, | Performed by: PHYSICIAN ASSISTANT

## 2022-06-28 PROCEDURE — 3008F PR BODY MASS INDEX (BMI) DOCUMENTED: ICD-10-PCS | Mod: CPTII,S$GLB,, | Performed by: PHYSICIAN ASSISTANT

## 2022-06-28 PROCEDURE — 3078F PR MOST RECENT DIASTOLIC BLOOD PRESSURE < 80 MM HG: ICD-10-PCS | Mod: CPTII,S$GLB,, | Performed by: PHYSICIAN ASSISTANT

## 2022-06-28 PROCEDURE — 3077F PR MOST RECENT SYSTOLIC BLOOD PRESSURE >= 140 MM HG: ICD-10-PCS | Mod: CPTII,S$GLB,, | Performed by: PHYSICIAN ASSISTANT

## 2022-06-28 PROCEDURE — 1159F PR MEDICATION LIST DOCUMENTED IN MEDICAL RECORD: ICD-10-PCS | Mod: CPTII,S$GLB,, | Performed by: PHYSICIAN ASSISTANT

## 2022-06-28 PROCEDURE — U0002 COVID-19 LAB TEST NON-CDC: HCPCS | Mod: QW,S$GLB,, | Performed by: PHYSICIAN ASSISTANT

## 2022-06-28 PROCEDURE — 1160F PR REVIEW ALL MEDS BY PRESCRIBER/CLIN PHARMACIST DOCUMENTED: ICD-10-PCS | Mod: CPTII,S$GLB,, | Performed by: PHYSICIAN ASSISTANT

## 2022-06-28 PROCEDURE — 1159F MED LIST DOCD IN RCRD: CPT | Mod: CPTII,S$GLB,, | Performed by: PHYSICIAN ASSISTANT

## 2022-06-28 PROCEDURE — 99213 PR OFFICE/OUTPT VISIT, EST, LEVL III, 20-29 MIN: ICD-10-PCS | Mod: S$GLB,,, | Performed by: PHYSICIAN ASSISTANT

## 2022-06-28 PROCEDURE — 3008F BODY MASS INDEX DOCD: CPT | Mod: CPTII,S$GLB,, | Performed by: PHYSICIAN ASSISTANT

## 2022-06-28 PROCEDURE — 3077F SYST BP >= 140 MM HG: CPT | Mod: CPTII,S$GLB,, | Performed by: PHYSICIAN ASSISTANT

## 2022-06-28 NOTE — PROGRESS NOTES
"Subjective:       Patient ID: Migue George is a 46 y.o. male.    Vitals:  height is 5' 11" (1.803 m) and weight is 104.3 kg (230 lb). His tympanic temperature is 99.6 °F (37.6 °C). His blood pressure is 141/72 (abnormal) and his pulse is 110. His respiration is 18 and oxygen saturation is 97%.     Chief Complaint: Diarrhea    Migue George is a 46 year old male who presents today with complaints of diarrhea, chills, abdominal cramping, and N/V that started this morning.  He has no known sick contacts.  He denies cough, congestion, sore throat, SOB, or chest congestion.  Covid vaccine status is unknown.    Diarrhea   This is a new problem. The current episode started today. The problem occurs more than 10 times per day. The problem has been gradually improving. The stool consistency is described as watery and mucous. The patient states that diarrhea awakens him from sleep. Associated symptoms include chills, a fever and vomiting. Pertinent negatives include no abdominal pain, arthralgias, bloating, coughing, headaches, increased  flatus, myalgias, sweats, URI or weight loss. Nothing aggravates the symptoms. There are no known risk factors. Treatments tried: zofran and pepto. The treatment provided no relief. There is no history of bowel resection, inflammatory bowel disease, irritable bowel syndrome, malabsorption, a recent abdominal surgery or short gut syndrome.       Constitution: Positive for chills and fever.   Respiratory: Negative for cough.    Gastrointestinal: Positive for nausea, vomiting and diarrhea. Negative for abdominal pain.   Musculoskeletal: Negative.  Negative for joint pain and muscle ache.   Skin: Negative.    Neurological: Negative for headaches.       Objective:      Physical Exam   Constitutional: He is oriented to person, place, and time. He appears well-developed.   HENT:   Head: Normocephalic and atraumatic.   Ears:   Right Ear: Tympanic membrane, external ear and ear canal " normal.   Left Ear: Tympanic membrane, external ear and ear canal normal.   Nose: Nose normal.   Mouth/Throat: Mucous membranes are normal. Mucous membranes are moist.   Eyes: Conjunctivae and lids are normal.   Neck: Trachea normal. Neck supple.   Cardiovascular: Normal rate, regular rhythm and normal heart sounds.   Pulmonary/Chest: Effort normal and breath sounds normal. No respiratory distress.   Abdominal: Normal appearance and bowel sounds are normal. He exhibits no distension, no abdominal bruit, no pulsatile midline mass and no mass. Soft. flat abdomen There is no abdominal tenderness.   Musculoskeletal: Normal range of motion.         General: Normal range of motion.   Neurological: He is alert and oriented to person, place, and time. He has normal strength.   Skin: Skin is warm, dry, intact, not diaphoretic and not pale.   Psychiatric: His speech is normal and behavior is normal. Judgment and thought content normal.   Nursing note and vitals reviewed.        Assessment:       1. Viral gastroenteritis    2. Diarrhea, unspecified type    3. Nausea    4. Chills          Plan:         Viral gastroenteritis    Diarrhea, unspecified type  -     POCT COVID-19 Rapid Screening    Nausea  -     POCT COVID-19 Rapid Screening    Chills  -     POCT COVID-19 Rapid Screening          Results for orders placed or performed in visit on 06/28/22   POCT COVID-19 Rapid Screening   Result Value Ref Range    POC Rapid COVID Negative Negative     Acceptable Yes        Increase fluid intake.  Tylenol and/or Ibuprofen for fever or chills.  [place] diet for a few days.  States he has zofran at home.  Follow up as needed.  Go to ER with new or worsening symptoms.

## 2022-07-01 ENCOUNTER — TELEPHONE (OUTPATIENT)
Dept: URGENT CARE | Facility: CLINIC | Age: 47
End: 2022-07-01
Payer: COMMERCIAL

## 2022-07-22 ENCOUNTER — PATIENT MESSAGE (OUTPATIENT)
Dept: PRIMARY CARE CLINIC | Facility: CLINIC | Age: 47
End: 2022-07-22

## 2022-07-22 ENCOUNTER — OFFICE VISIT (OUTPATIENT)
Dept: PRIMARY CARE CLINIC | Facility: CLINIC | Age: 47
End: 2022-07-22
Payer: COMMERCIAL

## 2022-07-22 VITALS
HEART RATE: 64 BPM | WEIGHT: 236.31 LBS | DIASTOLIC BLOOD PRESSURE: 82 MMHG | SYSTOLIC BLOOD PRESSURE: 128 MMHG | BODY MASS INDEX: 33.08 KG/M2 | HEIGHT: 71 IN | TEMPERATURE: 98 F

## 2022-07-22 DIAGNOSIS — Z12.5 ENCOUNTER FOR PROSTATE CANCER SCREENING: ICD-10-CM

## 2022-07-22 DIAGNOSIS — Z11.4 ENCOUNTER FOR SCREENING FOR HIV: ICD-10-CM

## 2022-07-22 DIAGNOSIS — E03.9 ACQUIRED HYPOTHYROIDISM: ICD-10-CM

## 2022-07-22 DIAGNOSIS — F52.0 DECREASED SEXUAL DESIRE: ICD-10-CM

## 2022-07-22 DIAGNOSIS — F90.9 ATTENTION DEFICIT HYPERACTIVITY DISORDER (ADHD), UNSPECIFIED ADHD TYPE: Primary | ICD-10-CM

## 2022-07-22 DIAGNOSIS — R73.03 PREDIABETES: ICD-10-CM

## 2022-07-22 DIAGNOSIS — F32.A ANXIETY AND DEPRESSION: ICD-10-CM

## 2022-07-22 DIAGNOSIS — E78.2 MIXED HYPERLIPIDEMIA: ICD-10-CM

## 2022-07-22 DIAGNOSIS — F41.9 ANXIETY AND DEPRESSION: ICD-10-CM

## 2022-07-22 PROCEDURE — 3074F PR MOST RECENT SYSTOLIC BLOOD PRESSURE < 130 MM HG: ICD-10-PCS | Mod: CPTII,S$GLB,, | Performed by: FAMILY MEDICINE

## 2022-07-22 PROCEDURE — 99999 PR PBB SHADOW E&M-EST. PATIENT-LVL III: ICD-10-PCS | Mod: PBBFAC,,, | Performed by: FAMILY MEDICINE

## 2022-07-22 PROCEDURE — 1160F PR REVIEW ALL MEDS BY PRESCRIBER/CLIN PHARMACIST DOCUMENTED: ICD-10-PCS | Mod: CPTII,S$GLB,, | Performed by: FAMILY MEDICINE

## 2022-07-22 PROCEDURE — 99215 OFFICE O/P EST HI 40 MIN: CPT | Mod: S$GLB,,, | Performed by: FAMILY MEDICINE

## 2022-07-22 PROCEDURE — 1160F RVW MEDS BY RX/DR IN RCRD: CPT | Mod: CPTII,S$GLB,, | Performed by: FAMILY MEDICINE

## 2022-07-22 PROCEDURE — 3008F PR BODY MASS INDEX (BMI) DOCUMENTED: ICD-10-PCS | Mod: CPTII,S$GLB,, | Performed by: FAMILY MEDICINE

## 2022-07-22 PROCEDURE — 1159F MED LIST DOCD IN RCRD: CPT | Mod: CPTII,S$GLB,, | Performed by: FAMILY MEDICINE

## 2022-07-22 PROCEDURE — 3074F SYST BP LT 130 MM HG: CPT | Mod: CPTII,S$GLB,, | Performed by: FAMILY MEDICINE

## 2022-07-22 PROCEDURE — 1159F PR MEDICATION LIST DOCUMENTED IN MEDICAL RECORD: ICD-10-PCS | Mod: CPTII,S$GLB,, | Performed by: FAMILY MEDICINE

## 2022-07-22 PROCEDURE — 3008F BODY MASS INDEX DOCD: CPT | Mod: CPTII,S$GLB,, | Performed by: FAMILY MEDICINE

## 2022-07-22 PROCEDURE — 3079F DIAST BP 80-89 MM HG: CPT | Mod: CPTII,S$GLB,, | Performed by: FAMILY MEDICINE

## 2022-07-22 PROCEDURE — 99999 PR PBB SHADOW E&M-EST. PATIENT-LVL III: CPT | Mod: PBBFAC,,, | Performed by: FAMILY MEDICINE

## 2022-07-22 PROCEDURE — 3079F PR MOST RECENT DIASTOLIC BLOOD PRESSURE 80-89 MM HG: ICD-10-PCS | Mod: CPTII,S$GLB,, | Performed by: FAMILY MEDICINE

## 2022-07-22 PROCEDURE — 99215 PR OFFICE/OUTPT VISIT, EST, LEVL V, 40-54 MIN: ICD-10-PCS | Mod: S$GLB,,, | Performed by: FAMILY MEDICINE

## 2022-07-22 RX ORDER — TADALAFIL 5 MG/1
5 TABLET ORAL DAILY
Qty: 90 TABLET | Refills: 0 | Status: SHIPPED | OUTPATIENT
Start: 2022-07-22 | End: 2022-07-25 | Stop reason: SDUPTHER

## 2022-07-22 RX ORDER — SERTRALINE HYDROCHLORIDE 100 MG/1
100 TABLET, FILM COATED ORAL DAILY
COMMUNITY
Start: 2022-06-21 | End: 2023-06-23 | Stop reason: SDUPTHER

## 2022-07-22 RX ORDER — LEVOTHYROXINE SODIUM 200 UG/1
200 TABLET ORAL DAILY
COMMUNITY
Start: 2022-05-09 | End: 2022-07-22 | Stop reason: DRUGHIGH

## 2022-07-22 RX ORDER — DEXTROAMPHETAMINE SACCHARATE, AMPHETAMINE ASPARTATE, DEXTROAMPHETAMINE SULFATE AND AMPHETAMINE SULFATE 2.5; 2.5; 2.5; 2.5 MG/1; MG/1; MG/1; MG/1
1 TABLET ORAL 2 TIMES DAILY
Qty: 60 TABLET | Refills: 0 | Status: SHIPPED | OUTPATIENT
Start: 2022-07-22 | End: 2022-10-24

## 2022-07-22 RX ORDER — ATORVASTATIN CALCIUM 20 MG/1
TABLET, FILM COATED ORAL
COMMUNITY
Start: 2022-04-28 | End: 2022-10-24 | Stop reason: SDUPTHER

## 2022-07-22 RX ORDER — CITALOPRAM 20 MG/1
20 TABLET, FILM COATED ORAL DAILY
COMMUNITY
Start: 2022-06-20 | End: 2023-06-23 | Stop reason: SDUPTHER

## 2022-07-22 RX ORDER — DEXTROAMPHETAMINE SACCHARATE, AMPHETAMINE ASPARTATE, DEXTROAMPHETAMINE SULFATE AND AMPHETAMINE SULFATE 2.5; 2.5; 2.5; 2.5 MG/1; MG/1; MG/1; MG/1
1 TABLET ORAL DAILY
COMMUNITY
Start: 2022-04-28 | End: 2022-07-22 | Stop reason: DRUGHIGH

## 2022-07-22 RX ORDER — DEXTROAMPHETAMINE SACCHARATE, AMPHETAMINE ASPARTATE, DEXTROAMPHETAMINE SULFATE AND AMPHETAMINE SULFATE 2.5; 2.5; 2.5; 2.5 MG/1; MG/1; MG/1; MG/1
1 TABLET ORAL 2 TIMES DAILY
Qty: 60 TABLET | Refills: 0 | Status: SHIPPED | OUTPATIENT
Start: 2022-08-22 | End: 2022-10-24

## 2022-07-22 RX ORDER — DEXTROAMPHETAMINE SACCHARATE, AMPHETAMINE ASPARTATE, DEXTROAMPHETAMINE SULFATE AND AMPHETAMINE SULFATE 2.5; 2.5; 2.5; 2.5 MG/1; MG/1; MG/1; MG/1
1 TABLET ORAL 2 TIMES DAILY
Qty: 60 TABLET | Refills: 0 | Status: SHIPPED | OUTPATIENT
Start: 2022-09-22 | End: 2022-10-24

## 2022-07-22 NOTE — PATIENT INSTRUCTIONS
Physically, everything looks good today.    Prescription sent for Cialis to the pharmacy.  Please take this once daily.  We will see over the next couple of months if it helps with the sexual side effects.  If you do get some relief, we can certainly continue it.    Three-month supply Adderall sent to the pharmacy, as well.  Go ahead and take 1st dose in the morning, right before work, and next dose in the afternoon, around lunchtime.  This should give you coverage to get you through your entire shift.  At next visit, we can reassess and make any adjustments, as needed.    Continue taking all of your other medications, as prescribed.    Keep your follow-up appointments with Dr. Stevens and Dr. Carolina, as scheduled.    Please follow-up with me in 3 months for medication refill and wellness exam.  We will be doing blood work at that time, so if you would like to stop in a couple of days before the appointment to get the blood drawn, we can go over the results together.  Will place orders today.

## 2022-07-22 NOTE — PROGRESS NOTES
"Ntio Oscar MD    [facility]      [address] [place] [address]      Phone: [phone]      Fax: [phone]                  Office Visit  07/22/2022        Subjective      HPI:  Migue George is a 46 y.o. male presents today in clinic for "Establish Care  ."     46-year-old gentleman presents today to discuss ADHD, other issues.     Patient states that he feels pretty good today.  Denies chest pains, shortness of breath.  Denies fever, chills, body aches.  Denies coughing, sneezing, URI type symptoms.  States appetite is normal.  States bowel movements are normal.  Denies any urinary issues.     Has history of ADHD.  Received formal testing, catheterization diagnosis from previous psychologist.  Last visit, we started him on Adderall 10 mg daily.  He states that that does seem to work well.  He takes it in the morning before work.  Helps keep him focused, concentrating on tasks at hand.  His only complaint is that the duration seems to be too short.  He gets about 4 hours out of it, then starts to lose focus again.  The 2nd half of his shift is difficult to complete.  He works 12 hour shifts.  Denies any palpitations, headaches on the medication.  No issues sleeping.  Appetite is normal.    Still under a lot of stress at work.  Sees psychology regularly.  Just saw them this week.  They have him taking Zoloft 100 mg daily, along with Celexa 20 mg daily.  Overall, he states the counseling and medication does seem to be helping.  He has noticed some sexual side effects, though.  He states he has little to no desire to have intercourse.  He has tried Viagra in the past, but that did not seem to help.  He is enquiring about trying Cialis instead.  Of note, in the past, he was diagnosed with low testosterone levels and was on replacement therapy.  Approximately 2 years ago, he took a holiday from the testosterone treatments.  When his levels were checked, they were in the " normal range at that time.    Also has history of hypothyroidism.  Currently takes Synthroid 200 mcg daily.  No issues with this medication.    Also has history of elevated cholesterol levels.  Takes Lipitor 20 mg daily.  No issues with this medicine.    PMH: Diverticulitis, prediabetes, HLD, Hashimoto's/thyroid use, ADHD, anx/dep.  PSH: Colonoscopy June/2020, repeat in 3 years.  Scrotal vein surgery in 1995.  FMH: Dad-unknown.  Mom passed away from pancreatic cancer in her 50s, hypertension, thyroid issues, alcohol abuse.  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 Stevens (med mgmt), Dr. Carolina (counseling)  GI: Dr. Burciaga    Colon: 6/23/2020. 1 polyp (tubular adenoma), repeat in 3 yrs (2023)          The following were updated and reviewed by myself in the chart: medications, past medical history, past surgical history, family history, social history, and allergies.     Medications:  Current Outpatient Medications on File Prior to Visit   Medication Sig Dispense Refill    citalopram (CELEXA) 20 MG tablet Take 20 mg by mouth once daily.      sertraline (ZOLOFT) 100 MG tablet Take 100 mg by mouth once daily.      [DISCONTINUED] atorvastatin (LIPITOR) 20 MG tablet 1 tablet      [DISCONTINUED] dextroamphetamine-amphetamine 10 mg Tab Take 1 tablet by mouth once daily.      [DISCONTINUED] FEXOFENADINE HCL (ALLEGRA ORAL) Take by mouth.      [DISCONTINUED] SYNTHROID 200 mcg tablet Take 200 mcg by mouth once daily.      atorvastatin (LIPITOR) 20 MG tablet 1 tablet      [DISCONTINUED] (Magic mouthwash) 1:1:1 Benadryl 12.5mg/5ml liq, aluminum & magnesium hydroxide-simehticone (Maalox), LIDOcaine viscous 2% Swish and spit 10 mLs every 4 (four) hours as needed (prn). for mouth sores 275 mL 0    [DISCONTINUED] acyclovir (ZOVIRAX) 200 MG capsule Take 2 capsules (400 mg total) by mouth 2 (two) times daily. For fever  blisters 20 capsule 0    [DISCONTINUED] azithromycin (Z-SHEELA) 250 MG tablet As per packet instructions (Patient not taking: No sig reported) 6 tablet 0    [DISCONTINUED] benzonatate (TESSALON PERLES) 100 MG capsule Take 2 capsules (200 mg total) by mouth 3 (three) times daily as needed for Cough. (Patient not taking: No sig reported) 60 capsule 1    [DISCONTINUED] fenofibrate 160 MG Tab Take 1 tablet (160 mg total) by mouth once daily. (Patient not taking: No sig reported) 30 tablet 0    [DISCONTINUED] fluticasone propionate (FLONASE) 50 mcg/actuation nasal spray 2 sprays (100 mcg total) by Each Nostril route once daily. (Patient not taking: No sig reported) 16 g 0    [DISCONTINUED] levothyroxine (SYNTHROID) 150 MCG tablet Take 150 mcg by mouth once daily.      [DISCONTINUED] levothyroxine (SYNTHROID, LEVOTHROID) 175 MCG tablet Take 175 mcg by mouth once daily.      [DISCONTINUED] metFORMIN (GLUCOPHAGE) 500 MG tablet       [DISCONTINUED] pulse oximeter (PULSE OXIMETER) device Use twice daily at 8 AM and 3 PM and record the value in [facility] as directed. (Patient not taking: No sig reported) 1 each 0    [DISCONTINUED] venlafaxine (EFFEXOR-XR) 150 MG Cp24 Take 1 capsule (150 mg total) by mouth once daily. 30 capsule 11     Current Facility-Administered Medications on File Prior to Visit   Medication Dose Route Frequency Provider Last Rate Last Admin    [DISCONTINUED] acetaminophen tablet 650 mg  650 mg Oral Once PRN Theodore Whiting MD        [DISCONTINUED] albuterol inhaler 2 puff  2 puff Inhalation Q20 Min PRN Theodore Whiting MD        [DISCONTINUED] EPINEPHrine (EPIPEN) 0.3 mg/0.3 mL pen injection 0.3 mg  0.3 mg Intramuscular PRN Theodore Whiting MD        [DISCONTINUED] methylPREDNISolone sodium succinate injection 40 mg  40 mg Intravenous Once PRN Theodore Whiting MD        [DISCONTINUED] ondansetron disintegrating tablet 4 mg  4 mg Oral Once PRN Theodore Whiting MD        [DISCONTINUED] sodium  chloride 0.9% 500 mL flush bag   Intravenous PRN Theodore Whiting MD        [DISCONTINUED] sodium chloride 0.9% flush 10 mL  10 mL Intravenous PRN Theodore Whiting MD            PMHx:  Past Medical History:   Diagnosis Date    Anxiety     Depression     Hyperlipidemia       Patient Active Problem List    Diagnosis Date Noted    Hypothyroid 2015    Hyperlipidemia     Depression         PSHx:  Past Surgical History:   Procedure Laterality Date    testicular vein ligation      VASECTOMY          FHx:  Family History   Problem Relation Age of Onset    Pancreatic cancer Mother     Diabetes Mother     Diabetes Unknown     Coronary artery disease Maternal Grandfather     Coronary artery disease Maternal Grandmother         Social:  Social History     Socioeconomic History    Marital status:     Number of children: 4   Occupational History    Occupation: [employer]      Employer: [employer]   Tobacco Use    Smoking status: Former Smoker     Quit date: 2004     Years since quittin.9    Smokeless tobacco: Never Used   Substance and Sexual Activity    Alcohol use: Yes     Alcohol/week: 0.8 standard drinks     Types: 1 Standard drinks or equivalent per week    Drug use: No    Sexual activity: Yes        Allergies:  Review of patient's allergies indicates:  No Known Allergies     ROS:  Review of Systems   Constitutional: Negative for activity change, appetite change, chills and fever.   HENT: Negative for congestion, postnasal drip, rhinorrhea, sore throat and trouble swallowing.    Respiratory: Negative for cough and shortness of breath.    Cardiovascular: Negative for chest pain and palpitations.   Gastrointestinal: Negative for abdominal pain, constipation, diarrhea, nausea and vomiting.   Genitourinary: Negative for difficulty urinating.   Musculoskeletal: Negative for arthralgias and myalgias.   Skin: Negative for color change and rash.   Neurological:  "Negative for headaches.   All other systems reviewed and are negative.         Objective      /82   Pulse 64   Temp 98.2 °F (36.8 °C)   Ht 5' 11" (1.803 m)   Wt 107.2 kg (236 lb 5.3 oz)   BMI 32.96 kg/m²   Ht Readings from Last 3 Encounters:   07/22/22 5' 11" (1.803 m)   06/28/22 5' 11" (1.803 m)   11/13/21 5' 11" (1.803 m)     Wt Readings from Last 3 Encounters:   07/22/22 107.2 kg (236 lb 5.3 oz)   06/28/22 104.3 kg (230 lb)   11/13/21 96.2 kg (212 lb)       PHYSICAL EXAM:  Physical Exam  Vitals and nursing note reviewed.   Constitutional:       General: He is not in acute distress.     Appearance: Normal appearance.   HENT:      Head: Normocephalic and atraumatic.      Right Ear: Tympanic membrane, ear canal and external ear normal.      Left Ear: Tympanic membrane, ear canal and external ear normal.      Nose: Nose normal. No congestion or rhinorrhea.      Mouth/Throat:      Mouth: Mucous membranes are moist.      Pharynx: Oropharynx is clear. No oropharyngeal exudate or posterior oropharyngeal erythema.   Eyes:      Extraocular Movements: Extraocular movements intact.      Conjunctiva/sclera: Conjunctivae normal.      Pupils: Pupils are equal, round, and reactive to light.   Cardiovascular:      Rate and Rhythm: Normal rate and regular rhythm.   Pulmonary:      Effort: Pulmonary effort is normal.      Breath sounds: No wheezing, rhonchi or rales.   Musculoskeletal:         General: Normal range of motion.      Cervical back: Normal range of motion.   Lymphadenopathy:      Cervical: No cervical adenopathy.   Skin:     General: Skin is warm and dry.   Neurological:      General: No focal deficit present.      Mental Status: He is alert.              LABS / IMAGING:  Recent Results (from the past 4368 hour(s))   POCT COVID-19 Rapid Screening    Collection Time: 06/28/22  5:12 PM   Result Value Ref Range    POC Rapid COVID Negative Negative     Acceptable Yes          Assessment    1. " Attention deficit hyperactivity disorder (ADHD), unspecified ADHD type    2. Anxiety and depression    3. Mixed hyperlipidemia    4. Acquired hypothyroidism    5. Decreased sexual desire    6. Prediabetes    7. Encounter for screening for HIV    8. Encounter for prostate cancer screening          Plan    Migue was seen today for [facility] care.    Diagnoses and all orders for this visit:    Attention deficit hyperactivity disorder (ADHD), unspecified ADHD type  -     dextroamphetamine-amphetamine (ADDERALL) 10 mg Tab; Take 1 tablet (10 mg total) by mouth 2 (two) times a day.  -     dextroamphetamine-amphetamine (ADDERALL) 10 mg Tab; Take 1 tablet (10 mg total) by mouth 2 (two) times a day.  -     dextroamphetamine-amphetamine (ADDERALL) 10 mg Tab; Take 1 tablet (10 mg total) by mouth 2 (two) times a day.    Anxiety and depression  -     CBC Auto Differential; Future  -     Comprehensive Metabolic Panel; Future    Mixed hyperlipidemia  -     Lipid Panel; Future    Acquired hypothyroidism  -     TSH; Future    Decreased sexual desire  -     tadalafiL (CIALIS) 5 MG tablet; Take 1 tablet (5 mg total) by mouth once daily.  -     CBC Auto Differential; Future  -     Comprehensive Metabolic Panel; Future  -     TESTOSTERONE, FREE (DIALYSIS) AND TOTAL, LC/MS/MS; Future    Prediabetes  -     Hemoglobin A1C; Future    Encounter for screening for HIV  -     HIV 1/2 Ag/Ab (4th Gen); Future    Encounter for prostate cancer screening  -     PSA, Screening; Future      Physically, appears fine today.    Will place orders today for screening labs to be done prior to annual wellness visit in September.    For ADHD, will increase Adderall to 10 mg b.i.d..  Three month supply given.  Med check in September.    Spoke with patient regarding decreased sexual desire.  Likely side effect from SSRI medications.  He does have history of low testosterone, so will check that with screening labs at next visit.  Can do trial of daily Cialis  until then.    FOLLOW-UP:  Follow up in about 3 months (around 10/22/2022) for med refill, annual exam.    I spent a total of 50 minutes face to face and non-face to face on the date of this visit.This includes time preparing to see the patient (eg, review of tests, notes), obtaining and/or reviewing additional history from an independent historian and/or outside medical records, documenting clinical information in the electronic health record, independently interpreting results and/or communicating results to the patient/family/caregiver, or care coordinator.    Signed by:  Nito Oscar MD

## 2022-07-25 ENCOUNTER — PATIENT MESSAGE (OUTPATIENT)
Dept: PRIMARY CARE CLINIC | Facility: CLINIC | Age: 47
End: 2022-07-25
Payer: COMMERCIAL

## 2022-07-25 RX ORDER — TADALAFIL 5 MG/1
5 TABLET ORAL DAILY
Qty: 90 TABLET | Refills: 3 | Status: SHIPPED | OUTPATIENT
Start: 2022-07-25 | End: 2023-06-23 | Stop reason: SDUPTHER

## 2022-10-18 ENCOUNTER — LAB VISIT (OUTPATIENT)
Dept: LAB | Facility: HOSPITAL | Age: 47
End: 2022-10-18
Attending: FAMILY MEDICINE
Payer: COMMERCIAL

## 2022-10-18 DIAGNOSIS — R73.03 PREDIABETES: ICD-10-CM

## 2022-10-18 DIAGNOSIS — E78.2 MIXED HYPERLIPIDEMIA: ICD-10-CM

## 2022-10-18 DIAGNOSIS — Z12.5 ENCOUNTER FOR PROSTATE CANCER SCREENING: ICD-10-CM

## 2022-10-18 DIAGNOSIS — F41.9 ANXIETY AND DEPRESSION: ICD-10-CM

## 2022-10-18 DIAGNOSIS — Z11.4 ENCOUNTER FOR SCREENING FOR HIV: ICD-10-CM

## 2022-10-18 DIAGNOSIS — F32.A ANXIETY AND DEPRESSION: ICD-10-CM

## 2022-10-18 DIAGNOSIS — F52.0 DECREASED SEXUAL DESIRE: ICD-10-CM

## 2022-10-18 DIAGNOSIS — E03.9 ACQUIRED HYPOTHYROIDISM: ICD-10-CM

## 2022-10-18 LAB
ALBUMIN SERPL BCP-MCNC: 4.4 G/DL (ref 3.5–5.2)
ALP SERPL-CCNC: 76 U/L (ref 55–135)
ALT SERPL W/O P-5'-P-CCNC: 14 U/L (ref 10–44)
ANION GAP SERPL CALC-SCNC: 7 MMOL/L (ref 8–16)
AST SERPL-CCNC: 14 U/L (ref 10–40)
BASOPHILS # BLD AUTO: 0.03 K/UL (ref 0–0.2)
BASOPHILS NFR BLD: 0.6 % (ref 0–1.9)
BILIRUB SERPL-MCNC: 0.4 MG/DL (ref 0.1–1)
BUN SERPL-MCNC: 13 MG/DL (ref 6–20)
CALCIUM SERPL-MCNC: 9.5 MG/DL (ref 8.7–10.5)
CHLORIDE SERPL-SCNC: 106 MMOL/L (ref 95–110)
CHOLEST SERPL-MCNC: 160 MG/DL (ref 120–199)
CHOLEST/HDLC SERPL: 3.8 {RATIO} (ref 2–5)
CO2 SERPL-SCNC: 28 MMOL/L (ref 23–29)
CREAT SERPL-MCNC: 1.1 MG/DL (ref 0.5–1.4)
DIFFERENTIAL METHOD: NORMAL
EOSINOPHIL # BLD AUTO: 0.2 K/UL (ref 0–0.5)
EOSINOPHIL NFR BLD: 4.1 % (ref 0–8)
ERYTHROCYTE [DISTWIDTH] IN BLOOD BY AUTOMATED COUNT: 13.7 % (ref 11.5–14.5)
EST. GFR  (NO RACE VARIABLE): >60 ML/MIN/1.73 M^2
ESTIMATED AVG GLUCOSE: 111 MG/DL (ref 68–131)
GLUCOSE SERPL-MCNC: 90 MG/DL (ref 70–110)
HBA1C MFR BLD: 5.5 % (ref 4–5.6)
HCT VFR BLD AUTO: 45.8 % (ref 40–54)
HDLC SERPL-MCNC: 42 MG/DL (ref 40–75)
HDLC SERPL: 26.3 % (ref 20–50)
HGB BLD-MCNC: 15 G/DL (ref 14–18)
IMM GRANULOCYTES # BLD AUTO: 0.02 K/UL (ref 0–0.04)
IMM GRANULOCYTES NFR BLD AUTO: 0.4 % (ref 0–0.5)
LDLC SERPL CALC-MCNC: 74.6 MG/DL (ref 63–159)
LYMPHOCYTES # BLD AUTO: 1.1 K/UL (ref 1–4.8)
LYMPHOCYTES NFR BLD: 21.1 % (ref 18–48)
MCH RBC QN AUTO: 27.2 PG (ref 27–31)
MCHC RBC AUTO-ENTMCNC: 32.8 G/DL (ref 32–36)
MCV RBC AUTO: 83 FL (ref 82–98)
MONOCYTES # BLD AUTO: 0.5 K/UL (ref 0.3–1)
MONOCYTES NFR BLD: 9.6 % (ref 4–15)
NEUTROPHILS # BLD AUTO: 3.3 K/UL (ref 1.8–7.7)
NEUTROPHILS NFR BLD: 64.2 % (ref 38–73)
NONHDLC SERPL-MCNC: 118 MG/DL
NRBC BLD-RTO: 0 /100 WBC
PLATELET # BLD AUTO: 223 K/UL (ref 150–450)
PMV BLD AUTO: 12.1 FL (ref 9.2–12.9)
POTASSIUM SERPL-SCNC: 4.6 MMOL/L (ref 3.5–5.1)
PROT SERPL-MCNC: 7.1 G/DL (ref 6–8.4)
RBC # BLD AUTO: 5.52 M/UL (ref 4.6–6.2)
SODIUM SERPL-SCNC: 141 MMOL/L (ref 136–145)
TRIGL SERPL-MCNC: 217 MG/DL (ref 30–150)
WBC # BLD AUTO: 5.11 K/UL (ref 3.9–12.7)

## 2022-10-18 PROCEDURE — 80061 LIPID PANEL: CPT | Performed by: FAMILY MEDICINE

## 2022-10-18 PROCEDURE — 83036 HEMOGLOBIN GLYCOSYLATED A1C: CPT | Performed by: FAMILY MEDICINE

## 2022-10-18 PROCEDURE — 84153 ASSAY OF PSA TOTAL: CPT | Performed by: FAMILY MEDICINE

## 2022-10-18 PROCEDURE — 80053 COMPREHEN METABOLIC PANEL: CPT | Performed by: FAMILY MEDICINE

## 2022-10-18 PROCEDURE — 84403 ASSAY OF TOTAL TESTOSTERONE: CPT | Performed by: FAMILY MEDICINE

## 2022-10-18 PROCEDURE — 84443 ASSAY THYROID STIM HORMONE: CPT | Performed by: FAMILY MEDICINE

## 2022-10-18 PROCEDURE — 85025 COMPLETE CBC W/AUTO DIFF WBC: CPT | Performed by: FAMILY MEDICINE

## 2022-10-18 PROCEDURE — 87389 HIV-1 AG W/HIV-1&-2 AB AG IA: CPT | Performed by: FAMILY MEDICINE

## 2022-10-19 LAB
COMPLEXED PSA SERPL-MCNC: 0.29 NG/ML (ref 0–4)
HIV 1+2 AB+HIV1 P24 AG SERPL QL IA: NORMAL
TSH SERPL DL<=0.005 MIU/L-ACNC: 2.04 UIU/ML (ref 0.4–4)

## 2022-10-19 NOTE — PROGRESS NOTES
Mr. Camarena,     You should be able to see your recent blood work in [facility] [place].  Overall, everything looks pretty good!    We will discuss these things in detail at your visit next week.

## 2022-10-24 ENCOUNTER — OFFICE VISIT (OUTPATIENT)
Dept: PRIMARY CARE CLINIC | Facility: CLINIC | Age: 47
End: 2022-10-24
Payer: COMMERCIAL

## 2022-10-24 VITALS
SYSTOLIC BLOOD PRESSURE: 118 MMHG | HEART RATE: 70 BPM | TEMPERATURE: 98 F | HEIGHT: 71 IN | BODY MASS INDEX: 32.13 KG/M2 | WEIGHT: 229.5 LBS | DIASTOLIC BLOOD PRESSURE: 86 MMHG

## 2022-10-24 DIAGNOSIS — E03.9 ACQUIRED HYPOTHYROIDISM: ICD-10-CM

## 2022-10-24 DIAGNOSIS — Z87.19 HISTORY OF BLOODY STOOLS: ICD-10-CM

## 2022-10-24 DIAGNOSIS — F90.9 ATTENTION DEFICIT HYPERACTIVITY DISORDER (ADHD), UNSPECIFIED ADHD TYPE: ICD-10-CM

## 2022-10-24 DIAGNOSIS — E78.2 MIXED HYPERLIPIDEMIA: ICD-10-CM

## 2022-10-24 DIAGNOSIS — F32.A ANXIETY AND DEPRESSION: ICD-10-CM

## 2022-10-24 DIAGNOSIS — Z00.00 ANNUAL PHYSICAL EXAM: Primary | ICD-10-CM

## 2022-10-24 DIAGNOSIS — F41.9 ANXIETY AND DEPRESSION: ICD-10-CM

## 2022-10-24 DIAGNOSIS — Z86.010 HISTORY OF COLON POLYPS: ICD-10-CM

## 2022-10-24 PROCEDURE — 3079F PR MOST RECENT DIASTOLIC BLOOD PRESSURE 80-89 MM HG: ICD-10-PCS | Mod: CPTII,S$GLB,, | Performed by: FAMILY MEDICINE

## 2022-10-24 PROCEDURE — 3079F DIAST BP 80-89 MM HG: CPT | Mod: CPTII,S$GLB,, | Performed by: FAMILY MEDICINE

## 2022-10-24 PROCEDURE — 3074F PR MOST RECENT SYSTOLIC BLOOD PRESSURE < 130 MM HG: ICD-10-PCS | Mod: CPTII,S$GLB,, | Performed by: FAMILY MEDICINE

## 2022-10-24 PROCEDURE — 3044F PR MOST RECENT HEMOGLOBIN A1C LEVEL <7.0%: ICD-10-PCS | Mod: CPTII,S$GLB,, | Performed by: FAMILY MEDICINE

## 2022-10-24 PROCEDURE — 1159F PR MEDICATION LIST DOCUMENTED IN MEDICAL RECORD: ICD-10-PCS | Mod: CPTII,S$GLB,, | Performed by: FAMILY MEDICINE

## 2022-10-24 PROCEDURE — 99396 PREV VISIT EST AGE 40-64: CPT | Mod: S$GLB,,, | Performed by: FAMILY MEDICINE

## 2022-10-24 PROCEDURE — 99396 PR PREVENTIVE VISIT,EST,40-64: ICD-10-PCS | Mod: S$GLB,,, | Performed by: FAMILY MEDICINE

## 2022-10-24 PROCEDURE — 1159F MED LIST DOCD IN RCRD: CPT | Mod: CPTII,S$GLB,, | Performed by: FAMILY MEDICINE

## 2022-10-24 PROCEDURE — 99999 PR PBB SHADOW E&M-EST. PATIENT-LVL IV: ICD-10-PCS | Mod: PBBFAC,,, | Performed by: FAMILY MEDICINE

## 2022-10-24 PROCEDURE — 3044F HG A1C LEVEL LT 7.0%: CPT | Mod: CPTII,S$GLB,, | Performed by: FAMILY MEDICINE

## 2022-10-24 PROCEDURE — 99999 PR PBB SHADOW E&M-EST. PATIENT-LVL IV: CPT | Mod: PBBFAC,,, | Performed by: FAMILY MEDICINE

## 2022-10-24 PROCEDURE — 3074F SYST BP LT 130 MM HG: CPT | Mod: CPTII,S$GLB,, | Performed by: FAMILY MEDICINE

## 2022-10-24 RX ORDER — DEXTROAMPHETAMINE SACCHARATE, AMPHETAMINE ASPARTATE, DEXTROAMPHETAMINE SULFATE AND AMPHETAMINE SULFATE 2.5; 2.5; 2.5; 2.5 MG/1; MG/1; MG/1; MG/1
1 TABLET ORAL 2 TIMES DAILY
Qty: 60 TABLET | Refills: 0 | Status: SHIPPED | OUTPATIENT
Start: 2022-12-23 | End: 2023-01-22

## 2022-10-24 RX ORDER — LEVOTHYROXINE SODIUM 200 UG/1
200 TABLET ORAL
Qty: 90 TABLET | Refills: 3 | Status: SHIPPED | OUTPATIENT
Start: 2022-10-24 | End: 2023-06-15 | Stop reason: SDUPTHER

## 2022-10-24 RX ORDER — DEXTROAMPHETAMINE SACCHARATE, AMPHETAMINE ASPARTATE, DEXTROAMPHETAMINE SULFATE AND AMPHETAMINE SULFATE 2.5; 2.5; 2.5; 2.5 MG/1; MG/1; MG/1; MG/1
1 TABLET ORAL 2 TIMES DAILY
Qty: 60 TABLET | Refills: 0 | Status: SHIPPED | OUTPATIENT
Start: 2022-10-24 | End: 2022-11-23

## 2022-10-24 RX ORDER — LEVOTHYROXINE SODIUM 200 UG/1
TABLET ORAL
COMMUNITY
Start: 2022-08-11 | End: 2022-10-24 | Stop reason: SDUPTHER

## 2022-10-24 RX ORDER — DEXTROAMPHETAMINE SACCHARATE, AMPHETAMINE ASPARTATE, DEXTROAMPHETAMINE SULFATE AND AMPHETAMINE SULFATE 2.5; 2.5; 2.5; 2.5 MG/1; MG/1; MG/1; MG/1
1 TABLET ORAL 2 TIMES DAILY
Qty: 60 TABLET | Refills: 0 | Status: SHIPPED | OUTPATIENT
Start: 2022-11-23 | End: 2022-12-23

## 2022-10-24 RX ORDER — ATORVASTATIN CALCIUM 20 MG/1
20 TABLET, FILM COATED ORAL DAILY
Qty: 90 TABLET | Refills: 3 | Status: SHIPPED | OUTPATIENT
Start: 2022-10-24 | End: 2023-06-23 | Stop reason: SDUPTHER

## 2022-10-24 NOTE — PROGRESS NOTES
[facility]       [address] [place] [address]      Phone: [phone]      Fax: [phone]    Nito Oscar MD    Office Visit  10/24/2022    Follow-up      47-year-old gentleman presents today for annual wellness exam, med refills.     Patient states that he feels fine today.  Denies chest pains, shortness of breath.  Denies fever, chills, body aches.  Denies coughing, sneezing, URI type symptoms.  States appetite is normal.  States bowel movements are normal-for him.  Takes fiber supplements to keep himself regular Denies any urinary issues.     Has history of ADHD.  Completed formal testing with psychologist.  Currently takes Adderall 10 mg, twice daily.  He states that this dose, regimen work well for him.  He takes it in the morning before work.  Helps keep him focused, concentrating on tasks at hand.  No issues sleeping.  Appetite is normal.    Still under a lot of stress at work.  Sees psychology regularly.  This months appointment got rescheduled to November.  Currently taking Zoloft 100 mg daily, along with Celexa 20 mg daily.  Overall, he states the counseling and medication does seem to be helping.     His anxiety/depression medications were causing some sexual side effects.  Currently taking Cialis 5 mg daily.  States this is working well, helping with ED and desire issues.  Had testosterone levels checked recently, still waiting for the results.  In the past, he was diagnosed with low testosterone levels and was on replacement therapy.  Approximately 2 years ago, he took a holiday from the testosterone treatments.      Also has hypothyroidism.  Currently takes Synthroid 200 mcg daily.  No issues with this medication.    Also has elevated cholesterol levels.  Takes Lipitor 20 mg daily.  No issues with this medicine.    In the past, had blood in stool.  Saw GI, had colonoscopy.  Was recommended he repeat his scope in June, 2023.  He would  like referral to our gastroenterologist to get established.    PMH: Diverticulitis, prediabetes, HLD, Hashimoto's/thyroid use, ADHD, anx/dep.  PSH: Colonoscopy 2020, repeat in 3 years.  Scrotal vein surgery in .  FMH: Dad-unknown.  Mom passed away from pancreatic cancer in her 50s, hypertension, thyroid issues, alcohol abuse.  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 Stevens (med mgmt), Dr. Carolina (counseling)  GI: Dr. Burciaga    Colon: 2020. 1 polyp (tubular adenoma), repeat in 3 yrs ()          Immunizations:  Immunization History   Administered Date(s) Administered    Influenza (FLUBLOK) - Quadrivalent - Recombinant - PF *Preferred* (egg allergy) 2020    Influenza - Intradermal - Quadrivalent - PF 2014    Influenza - Trivalent - PF (ADULT) 2008, 10/13/2010, 10/21/2011, 2012    Influenza Split 2008, 10/13/2010, 10/21/2011, 2012    Tdap 2012, 2016       Care Teams:  Patient Care Team:  Nito Oscar MD as PCP - General (Family Medicine)  Elizabeth Yañez LPN as Care Coordinator (Internal Medicine)    Medical History:  Past Medical History:   Diagnosis Date    Anxiety     Depression     Hyperlipidemia        Social History:  Social History     Socioeconomic History    Marital status:     Number of children: 4   Occupational History    Occupation: [employer]      Employer: [employer]   Tobacco Use    Smoking status: Former     Types: Cigarettes     Quit date: 2004     Years since quittin.2    Smokeless tobacco: Never   Substance and Sexual Activity    Alcohol use: Yes     Alcohol/week: 0.8 standard drinks     Types: 1 Standard drinks or equivalent per week    Drug use: No    Sexual activity: Yes       Alcohol History:  Social History     Substance and Sexual Activity   Alcohol Use Yes    Alcohol/week:  0.8 standard drinks    Types: 1 Standard drinks or equivalent per week       Tobacco History:  Social History     Tobacco Use   Smoking Status Former    Types: Cigarettes    Quit date: 2004    Years since quittin.2   Smokeless Tobacco Never       Family History:  Family History   Problem Relation Age of Onset    Pancreatic cancer Mother     Diabetes Mother     Diabetes Unknown     Coronary artery disease Maternal Grandfather     Coronary artery disease Maternal Grandmother        Surgical History:  Past Surgical History:   Procedure Laterality Date    testicular vein ligation      VASECTOMY         Allergies:  Review of patient's allergies indicates:  No Known Allergies    Medications:    Current Outpatient Medications:     citalopram (CELEXA) 20 MG tablet, Take 20 mg by mouth once daily., Disp: , Rfl:     sertraline (ZOLOFT) 100 MG tablet, Take 100 mg by mouth once daily., Disp: , Rfl:     tadalafiL (CIALIS) 5 MG tablet, Take 1 tablet (5 mg total) by mouth once daily., Disp: 90 tablet, Rfl: 3    atorvastatin (LIPITOR) 20 MG tablet, Take 1 tablet (20 mg total) by mouth once daily., Disp: 90 tablet, Rfl: 3    dextroamphetamine-amphetamine (ADDERALL) 10 mg Tab, Take 1 tablet (10 mg total) by mouth 2 (two) times a day., Disp: 60 tablet, Rfl: 0    [START ON 2022] dextroamphetamine-amphetamine (ADDERALL) 10 mg Tab, Take 1 tablet (10 mg total) by mouth 2 (two) times a day., Disp: 60 tablet, Rfl: 0    [START ON 2022] dextroamphetamine-amphetamine (ADDERALL) 10 mg Tab, Take 1 tablet (10 mg total) by mouth 2 (two) times a day., Disp: 60 tablet, Rfl: 0    SYNTHROID 200 mcg tablet, Take 1 tablet (200 mcg total) by mouth before breakfast., Disp: 90 tablet, Rfl: 3    Health Maintenance:  Health Maintenance   Topic Date Due    Lipid Panel  10/18/2023    TETANUS VACCINE  2026    Hepatitis C Screening  Completed       Screening Questions  1.  Do you smoke? No  2.  In the past month have you been bothered  "by feeling "down", depressed or hopeless? No  3.  In the past month, have you experienced a loss of interest or pleasure in doing things? No  4.  In the past 3 months, on any single occasion have you had 5 or more drinks containing alcohol? No  5.  Regarding your use of alcohol, have you ever felt you should cut down on your drinking? No  6.  Are you sexually active? Yes  7   Do you exercise?  intermittently    Counseling  The patient was counseled regarding diet and exercise, motor vehicle safety, sun exposure, and use of vitamins and supplements.  The patient was counseled regarding Living Will/Durable Power-Of-.  The patient was given information regarding the dangers of smoking and the overuse of alcohol.      Review of Systems   Constitutional:  Negative for activity change, appetite change, chills and fever.   HENT:  Negative for congestion, postnasal drip, rhinorrhea, sore throat and trouble swallowing.    Respiratory:  Negative for cough and shortness of breath.    Cardiovascular:  Negative for chest pain and palpitations.   Gastrointestinal:  Negative for abdominal pain, constipation, diarrhea, nausea and vomiting.   Genitourinary:  Negative for difficulty urinating.   Musculoskeletal:  Negative for arthralgias and myalgias.   Skin:  Negative for color change and rash.   Neurological:  Negative for headaches.   All other systems reviewed and are negative.     Objective   Vitals:    10/24/22 0929   BP: 118/86   Pulse: 70   Temp: 98.1 °F (36.7 °C)   Weight: 104.1 kg (229 lb 8 oz)   Height: 5' 11" (1.803 m)     Physical Exam  Vitals and nursing note reviewed.   Constitutional:       General: He is not in acute distress.     Appearance: Normal appearance.   HENT:      Head: Normocephalic and atraumatic.      Right Ear: Tympanic membrane, ear canal and external ear normal.      Left Ear: Tympanic membrane, ear canal and external ear normal.      Nose: Nose normal. No congestion or rhinorrhea.      " Mouth/Throat:      Mouth: Mucous membranes are moist.      Pharynx: Oropharynx is clear. No oropharyngeal exudate or posterior oropharyngeal erythema.   Eyes:      Extraocular Movements: Extraocular movements intact.      Conjunctiva/sclera: Conjunctivae normal.      Pupils: Pupils are equal, round, and reactive to light.   Cardiovascular:      Rate and Rhythm: Normal rate and regular rhythm.   Pulmonary:      Effort: Pulmonary effort is normal.      Breath sounds: No wheezing, rhonchi or rales.   Musculoskeletal:         General: Normal range of motion.      Cervical back: Normal range of motion.   Lymphadenopathy:      Cervical: No cervical adenopathy.   Skin:     General: Skin is warm and dry.   Neurological:      General: No focal deficit present.      Mental Status: He is alert.        Recent Results (from the past 4368 hour(s))   POCT COVID-19 Rapid Screening    Collection Time: 06/28/22  5:12 PM   Result Value Ref Range    POC Rapid COVID Negative Negative     Acceptable Yes    CBC Auto Differential    Collection Time: 10/18/22  9:47 AM   Result Value Ref Range    WBC 5.11 3.90 - 12.70 K/uL    RBC 5.52 4.60 - 6.20 M/uL    Hemoglobin 15.0 14.0 - 18.0 g/dL    Hematocrit 45.8 40.0 - 54.0 %    MCV 83 82 - 98 fL    MCH 27.2 27.0 - 31.0 pg    MCHC 32.8 32.0 - 36.0 g/dL    RDW 13.7 11.5 - 14.5 %    Platelets 223 150 - 450 K/uL    MPV 12.1 9.2 - 12.9 fL    Immature Granulocytes 0.4 0.0 - 0.5 %    Gran # (ANC) 3.3 1.8 - 7.7 K/uL    Immature Grans (Abs) 0.02 0.00 - 0.04 K/uL    Lymph # 1.1 1.0 - 4.8 K/uL    Mono # 0.5 0.3 - 1.0 K/uL    Eos # 0.2 0.0 - 0.5 K/uL    Baso # 0.03 0.00 - 0.20 K/uL    nRBC 0 0 /100 WBC    Gran % 64.2 38.0 - 73.0 %    Lymph % 21.1 18.0 - 48.0 %    Mono % 9.6 4.0 - 15.0 %    Eosinophil % 4.1 0.0 - 8.0 %    Basophil % 0.6 0.0 - 1.9 %    Differential Method Automated    Comprehensive Metabolic Panel    Collection Time: 10/18/22  9:47 AM   Result Value Ref Range    Sodium 141 136 -  145 mmol/L    Potassium 4.6 3.5 - 5.1 mmol/L    Chloride 106 95 - 110 mmol/L    CO2 28 23 - 29 mmol/L    Glucose 90 70 - 110 mg/dL    BUN 13 6 - 20 mg/dL    Creatinine 1.1 0.5 - 1.4 mg/dL    Calcium 9.5 8.7 - 10.5 mg/dL    Total Protein 7.1 6.0 - 8.4 g/dL    Albumin 4.4 3.5 - 5.2 g/dL    Total Bilirubin 0.4 0.1 - 1.0 mg/dL    Alkaline Phosphatase 76 55 - 135 U/L    AST 14 10 - 40 U/L    ALT 14 10 - 44 U/L    Anion Gap 7 (L) 8 - 16 mmol/L    eGFR >60.0 >60 mL/min/1.73 m^2   TSH    Collection Time: 10/18/22  9:47 AM   Result Value Ref Range    TSH 2.037 0.400 - 4.000 uIU/mL   Lipid Panel    Collection Time: 10/18/22  9:47 AM   Result Value Ref Range    Cholesterol 160 120 - 199 mg/dL    Triglycerides 217 (H) 30 - 150 mg/dL    HDL 42 40 - 75 mg/dL    LDL Cholesterol 74.6 63.0 - 159.0 mg/dL    HDL/Cholesterol Ratio 26.3 20.0 - 50.0 %    Total Cholesterol/HDL Ratio 3.8 2.0 - 5.0    Non-HDL Cholesterol 118 mg/dL   PSA, Screening    Collection Time: 10/18/22  9:47 AM   Result Value Ref Range    PSA, Screen 0.29 0.00 - 4.00 ng/mL   Hemoglobin A1C    Collection Time: 10/18/22  9:47 AM   Result Value Ref Range    Hemoglobin A1C 5.5 4.0 - 5.6 %    Estimated Avg Glucose 111 68 - 131 mg/dL   HIV 1/2 Ag/Ab (4th Gen)    Collection Time: 10/18/22  9:47 AM   Result Value Ref Range    HIV 1/2 Ag/Ab Non-reactive Non-reactive       Dimple Camarena was seen today for follow-up.    Diagnoses and all orders for this visit:    Annual physical exam    Attention deficit hyperactivity disorder (ADHD), unspecified ADHD type  -     dextroamphetamine-amphetamine (ADDERALL) 10 mg Tab; Take 1 tablet (10 mg total) by mouth 2 (two) times a day.  -     dextroamphetamine-amphetamine (ADDERALL) 10 mg Tab; Take 1 tablet (10 mg total) by mouth 2 (two) times a day.  -     dextroamphetamine-amphetamine (ADDERALL) 10 mg Tab; Take 1 tablet (10 mg total) by mouth 2 (two) times a day.    Anxiety and depression    Mixed hyperlipidemia  -     atorvastatin  (LIPITOR) 20 MG tablet; Take 1 tablet (20 mg total) by mouth once daily.    Acquired hypothyroidism  -     SYNTHROID 200 mcg tablet; Take 1 tablet (200 mcg total) by mouth before breakfast.    History of bloody stools  -     Ambulatory referral/consult to Gastroenterology; Future    History of colon polyps  -     Ambulatory referral/consult to Gastroenterology; Future    Reviewed recent blood work.  Testosterone level still pending.      Med refills given.    Referral to GI to establish care, GI checkup, discuss upcoming colonoscopy.  Patient will schedule after January 1.    Follow-up:  Follow up in about 3 months (around 1/24/2023) for med refill, and in 1 year for annual exam.    Signed by:  Nito Oscar MD

## 2022-10-26 LAB
TESTOST FREE SERPL-MCNC: 114 PG/ML (ref 35–155)
TESTOST SERPL-MCNC: 579 NG/DL (ref 250–1100)

## 2022-11-28 ENCOUNTER — PATIENT MESSAGE (OUTPATIENT)
Dept: PRIMARY CARE CLINIC | Facility: CLINIC | Age: 47
End: 2022-11-28
Payer: COMMERCIAL

## 2023-01-17 NOTE — PROGRESS NOTES
GI Clinic Note    Referring provider: Dr. Nito Oscar    Chief complaint:   Chief Complaint   Patient presents with    Diarrhea       Clinical Summary: Migue George is a 47 y.o. male with PMH of ADHD, hypothyroid, who presents to clinic for rectal bleeding and established care.    Patient has intermittent episodes of rectal bleeding. Patient has known hemorrhoids. Patient also had episodes of diverticulitis in past ( 1/year) last in . Diverticulitis responds with oral ABX. No hospitalization required.Patient follows Dr Burciaga for GI complains and had Colonoscopy in past twice. Patient mentions that he had colonoscopy > 3 years and told have polyps nd  was recommended he repeat his scope in  . No records available    Abdominal pain - no  Reflux - no  Dysphagia - no   Bowel habits - normal  GI bleeding - none  NSAID usage - none    Dx hypothyroidism.  Currently takes Synthroid 200 mcg daily    Denies nausea, vomiting, hematemesis, melena, or hematochezia      Past Medical History:   Diagnosis Date    Anxiety     Depression     Hyperlipidemia      Past Surgical History:   Procedure Laterality Date    testicular vein ligation      VASECTOMY       Family History   Problem Relation Age of Onset    Pancreatic cancer Mother     Diabetes Mother     Diabetes Unknown     Coronary artery disease Maternal Grandfather     Coronary artery disease Maternal Grandmother      Social History     Socioeconomic History    Marital status:     Number of children: 4   Occupational History    Occupation: [employer]      Employer: [employer]   Tobacco Use    Smoking status: Former     Types: Cigarettes     Quit date: 2004     Years since quittin.4    Smokeless tobacco: Never   Substance and Sexual Activity    Alcohol use: Yes     Alcohol/week: 0.8 standard drinks     Types: 1 Standard drinks or equivalent per week    Drug use: No    Sexual activity: Yes       Review of Systems:  14  point ROS negative except for above pertinent positives.    Review of Systems   Constitutional:  Negative for fever and weight loss.   HENT:  Negative for hearing loss and tinnitus.    Eyes:  Negative for double vision and photophobia.   Respiratory:  Negative for hemoptysis and wheezing.    Cardiovascular:  Negative for chest pain and palpitations.   Gastrointestinal:  Negative for blood in stool and melena.   Musculoskeletal:  Negative for falls.   Skin:  Negative for itching and rash.   Neurological:  Negative for focal weakness.   Endo/Heme/Allergies:  Negative for polydipsia.   Psychiatric/Behavioral:  Negative for hallucinations and suicidal ideas.          Objective:    Physical Exam:  Vitals:    01/18/23 1004   BP: 128/84   Pulse: 70       Physical Exam  Constitutional:       Appearance: Normal appearance.   HENT:      Head: Normocephalic and atraumatic.      Nose: No congestion.      Mouth/Throat:      Mouth: Mucous membranes are moist.   Eyes:      Pupils: Pupils are equal, round, and reactive to light.   Cardiovascular:      Heart sounds: No murmur heard.    No friction rub.   Pulmonary:      Effort: No respiratory distress.      Breath sounds: No stridor. No rhonchi.   Abdominal:      General: There is no distension.      Palpations: There is no mass.      Tenderness: There is no abdominal tenderness.   Musculoskeletal:         General: No swelling or deformity.      Cervical back: No rigidity or tenderness.   Skin:     Coloration: Skin is not jaundiced.      Findings: No bruising.   Neurological:      Mental Status: He is alert.      Cranial Nerves: No cranial nerve deficit.      Motor: No weakness.   Psychiatric:         Mood and Affect: Mood normal.         Behavior: Behavior normal.         Pertinent Labs:     No visits with results within 3 Month(s) from this visit.   Latest known visit with results is:   Lab Visit on 10/18/2022   Component Date Value Ref Range Status    WBC 10/18/2022 5.11  3.90 -  12.70 K/uL Final    RBC 10/18/2022 5.52  4.60 - 6.20 M/uL Final    Hemoglobin 10/18/2022 15.0  14.0 - 18.0 g/dL Final    Hematocrit 10/18/2022 45.8  40.0 - 54.0 % Final    MCV 10/18/2022 83  82 - 98 fL Final    MCH 10/18/2022 27.2  27.0 - 31.0 pg Final    MCHC 10/18/2022 32.8  32.0 - 36.0 g/dL Final    RDW 10/18/2022 13.7  11.5 - 14.5 % Final    Platelets 10/18/2022 223  150 - 450 K/uL Final    MPV 10/18/2022 12.1  9.2 - 12.9 fL Final    Immature Granulocytes 10/18/2022 0.4  0.0 - 0.5 % Final    Gran # (ANC) 10/18/2022 3.3  1.8 - 7.7 K/uL Final    Immature Grans (Abs) 10/18/2022 0.02  0.00 - 0.04 K/uL Final    Lymph # 10/18/2022 1.1  1.0 - 4.8 K/uL Final    Mono # 10/18/2022 0.5  0.3 - 1.0 K/uL Final    Eos # 10/18/2022 0.2  0.0 - 0.5 K/uL Final    Baso # 10/18/2022 0.03  0.00 - 0.20 K/uL Final    nRBC 10/18/2022 0  0 /100 WBC Final    Gran % 10/18/2022 64.2  38.0 - 73.0 % Final    Lymph % 10/18/2022 21.1  18.0 - 48.0 % Final    Mono % 10/18/2022 9.6  4.0 - 15.0 % Final    Eosinophil % 10/18/2022 4.1  0.0 - 8.0 % Final    Basophil % 10/18/2022 0.6  0.0 - 1.9 % Final    Differential Method 10/18/2022 Automated   Final    Sodium 10/18/2022 141  136 - 145 mmol/L Final    Potassium 10/18/2022 4.6  3.5 - 5.1 mmol/L Final    Chloride 10/18/2022 106  95 - 110 mmol/L Final    CO2 10/18/2022 28  23 - 29 mmol/L Final    Glucose 10/18/2022 90  70 - 110 mg/dL Final    BUN 10/18/2022 13  6 - 20 mg/dL Final    Creatinine 10/18/2022 1.1  0.5 - 1.4 mg/dL Final    Calcium 10/18/2022 9.5  8.7 - 10.5 mg/dL Final    Total Protein 10/18/2022 7.1  6.0 - 8.4 g/dL Final    Albumin 10/18/2022 4.4  3.5 - 5.2 g/dL Final    Total Bilirubin 10/18/2022 0.4  0.1 - 1.0 mg/dL Final    Alkaline Phosphatase 10/18/2022 76  55 - 135 U/L Final    AST 10/18/2022 14  10 - 40 U/L Final    ALT 10/18/2022 14  10 - 44 U/L Final    Anion Gap 10/18/2022 7 (L)  8 - 16 mmol/L Final    eGFR 10/18/2022 >60.0  >60 mL/min/1.73 m^2 Final    TSH 10/18/2022 2.037  0.400  - 4.000 uIU/mL Final    Cholesterol 10/18/2022 160  120 - 199 mg/dL Final    Triglycerides 10/18/2022 217 (H)  30 - 150 mg/dL Final    HDL 10/18/2022 42  40 - 75 mg/dL Final    LDL Cholesterol 10/18/2022 74.6  63.0 - 159.0 mg/dL Final    HDL/Cholesterol Ratio 10/18/2022 26.3  20.0 - 50.0 % Final    Total Cholesterol/HDL Ratio 10/18/2022 3.8  2.0 - 5.0 Final    Non-HDL Cholesterol 10/18/2022 118  mg/dL Final    PSA, Screen 10/18/2022 0.29  0.00 - 4.00 ng/mL Final    Hemoglobin A1C 10/18/2022 5.5  4.0 - 5.6 % Final    Estimated Avg Glucose 10/18/2022 111  68 - 131 mg/dL Final    HIV 1/2 Ag/Ab 10/18/2022 Non-reactive  Non-reactive Final    Testosterone, Total, LC/MS 10/18/2022 579  250 - 1100 ng/dL Final    Testosterone, Free by Dialysis 10/18/2022 114.0  35.0 - 155.0 pg/mL Final        Imaging:  No images are attached to the encounter.    Assessment:  Problem List Items Addressed This Visit          GI    Hemorrhoids     Proctofoam  fibers         Relevant Medications    sodium,potassium,mag sulfates (SUPREP BOWEL PREP KIT) 17.5-3.13-1.6 gram SolR    pramoxine-hydrocortisone (PROCTOCREAM-HC) 1-1 % rectal cream    Diverticulosis     Continue fibers         History of colon polyps     Colonoscopy           Relevant Medications    sodium,potassium,mag sulfates (SUPREP BOWEL PREP KIT) 17.5-3.13-1.6 gram SolR    Other Relevant Orders    Ambulatory referral/consult to Endo Procedure     Blood in stool - Primary     colonoscopy            Migue was seen today for diarrhea.    Diagnoses and all orders for this visit:    Blood in stool    History of colon polyps  -     Ambulatory referral/consult to Gastroenterology  -     sodium,potassium,mag sulfates (SUPREP BOWEL PREP KIT) 17.5-3.13-1.6 gram SolR; Take 177 mLs by mouth once daily. for 2 days  -     Ambulatory referral/consult to Endo Procedure ; Future    Hemorrhoids, unspecified hemorrhoid type  -     sodium,potassium,mag sulfates (SUPREP BOWEL PREP KIT)  17.5-3.13-1.6 gram SolR; Take 177 mLs by mouth once daily. for 2 days  -     pramoxine-hydrocortisone (PROCTOCREAM-HC) 1-1 % rectal cream; Place rectally 3 (three) times daily.    Diverticulosis          Follow up if symptoms worsen or fail to improve.      Time Statement  A total Time Includes preparing to see patient, reviewing  diagnostic studies and records, direct face-to-face visit, completing orders, medications , reconciliation, prescription management, and care coordination    We discussed in depth the nature of the patient's disease, the management plan in details. I have provided the patient with an opportunity to ask questions and have all questions answered to his satisfaction.       KOREY KHOURY MD  Gastroenterology & Transplant Hepatology  [facility] [place] [facility] [place]

## 2023-01-18 ENCOUNTER — OFFICE VISIT (OUTPATIENT)
Dept: GASTROENTEROLOGY | Facility: CLINIC | Age: 48
End: 2023-01-18
Payer: COMMERCIAL

## 2023-01-18 VITALS
DIASTOLIC BLOOD PRESSURE: 84 MMHG | WEIGHT: 229.25 LBS | OXYGEN SATURATION: 96 % | BODY MASS INDEX: 32.1 KG/M2 | HEIGHT: 71 IN | HEART RATE: 70 BPM | SYSTOLIC BLOOD PRESSURE: 128 MMHG

## 2023-01-18 DIAGNOSIS — K64.9 HEMORRHOIDS, UNSPECIFIED HEMORRHOID TYPE: ICD-10-CM

## 2023-01-18 DIAGNOSIS — K92.1 BLOOD IN STOOL: Primary | ICD-10-CM

## 2023-01-18 DIAGNOSIS — Z86.010 HISTORY OF COLON POLYPS: ICD-10-CM

## 2023-01-18 DIAGNOSIS — K57.90 DIVERTICULOSIS: ICD-10-CM

## 2023-01-18 PROBLEM — Z86.0100 HISTORY OF COLON POLYPS: Status: ACTIVE | Noted: 2023-01-18

## 2023-01-18 PROBLEM — R19.7 DIARRHEA OF PRESUMED INFECTIOUS ORIGIN: Status: ACTIVE | Noted: 2023-01-18

## 2023-01-18 PROCEDURE — 3074F PR MOST RECENT SYSTOLIC BLOOD PRESSURE < 130 MM HG: ICD-10-PCS | Mod: CPTII,S$GLB,, | Performed by: INTERNAL MEDICINE

## 2023-01-18 PROCEDURE — 3079F DIAST BP 80-89 MM HG: CPT | Mod: CPTII,S$GLB,, | Performed by: INTERNAL MEDICINE

## 2023-01-18 PROCEDURE — 1160F RVW MEDS BY RX/DR IN RCRD: CPT | Mod: CPTII,S$GLB,, | Performed by: INTERNAL MEDICINE

## 2023-01-18 PROCEDURE — 99999 PR PBB SHADOW E&M-EST. PATIENT-LVL V: ICD-10-PCS | Mod: PBBFAC,,, | Performed by: INTERNAL MEDICINE

## 2023-01-18 PROCEDURE — 99999 PR PBB SHADOW E&M-EST. PATIENT-LVL V: CPT | Mod: PBBFAC,,, | Performed by: INTERNAL MEDICINE

## 2023-01-18 PROCEDURE — 99204 OFFICE O/P NEW MOD 45 MIN: CPT | Mod: S$GLB,,, | Performed by: INTERNAL MEDICINE

## 2023-01-18 PROCEDURE — 3079F PR MOST RECENT DIASTOLIC BLOOD PRESSURE 80-89 MM HG: ICD-10-PCS | Mod: CPTII,S$GLB,, | Performed by: INTERNAL MEDICINE

## 2023-01-18 PROCEDURE — 1159F MED LIST DOCD IN RCRD: CPT | Mod: CPTII,S$GLB,, | Performed by: INTERNAL MEDICINE

## 2023-01-18 PROCEDURE — 3008F PR BODY MASS INDEX (BMI) DOCUMENTED: ICD-10-PCS | Mod: CPTII,S$GLB,, | Performed by: INTERNAL MEDICINE

## 2023-01-18 PROCEDURE — 1160F PR REVIEW ALL MEDS BY PRESCRIBER/CLIN PHARMACIST DOCUMENTED: ICD-10-PCS | Mod: CPTII,S$GLB,, | Performed by: INTERNAL MEDICINE

## 2023-01-18 PROCEDURE — 99204 PR OFFICE/OUTPT VISIT, NEW, LEVL IV, 45-59 MIN: ICD-10-PCS | Mod: S$GLB,,, | Performed by: INTERNAL MEDICINE

## 2023-01-18 PROCEDURE — 3074F SYST BP LT 130 MM HG: CPT | Mod: CPTII,S$GLB,, | Performed by: INTERNAL MEDICINE

## 2023-01-18 PROCEDURE — 3008F BODY MASS INDEX DOCD: CPT | Mod: CPTII,S$GLB,, | Performed by: INTERNAL MEDICINE

## 2023-01-18 PROCEDURE — 1159F PR MEDICATION LIST DOCUMENTED IN MEDICAL RECORD: ICD-10-PCS | Mod: CPTII,S$GLB,, | Performed by: INTERNAL MEDICINE

## 2023-01-18 RX ORDER — SODIUM, POTASSIUM,MAG SULFATES 17.5-3.13G
1 SOLUTION, RECONSTITUTED, ORAL ORAL DAILY
Qty: 1 KIT | Refills: 0 | Status: SHIPPED | OUTPATIENT
Start: 2023-01-18 | End: 2023-01-20

## 2023-01-18 RX ORDER — HYDROCORTISONE ACETATE PRAMOXINE HCL 1; 1 G/100G; G/100G
CREAM TOPICAL 3 TIMES DAILY
Qty: 3 EACH | Refills: 2 | Status: SHIPPED | OUTPATIENT
Start: 2023-01-18

## 2023-01-20 ENCOUNTER — HOSPITAL ENCOUNTER (OUTPATIENT)
Dept: PREADMISSION TESTING | Facility: HOSPITAL | Age: 48
Discharge: HOME | End: 2023-01-20
Attending: INTERNAL MEDICINE
Payer: COMMERCIAL

## 2023-01-20 DIAGNOSIS — Z86.010 HISTORY OF COLON POLYPS: Primary | ICD-10-CM

## 2023-03-12 ENCOUNTER — PATIENT MESSAGE (OUTPATIENT)
Dept: PRIMARY CARE CLINIC | Facility: CLINIC | Age: 48
End: 2023-03-12
Payer: COMMERCIAL

## 2023-03-13 DIAGNOSIS — B00.1 FEVER BLISTER: Primary | ICD-10-CM

## 2023-03-13 RX ORDER — ACYCLOVIR 400 MG/1
TABLET ORAL 2 TIMES DAILY
Status: CANCELLED | OUTPATIENT
Start: 2023-03-13 | End: 2024-03-12

## 2023-03-13 RX ORDER — VALACYCLOVIR HYDROCHLORIDE 1 G/1
2000 TABLET, FILM COATED ORAL 2 TIMES DAILY
Qty: 30 TABLET | Refills: 0 | Status: SHIPPED | OUTPATIENT
Start: 2023-03-13 | End: 2024-03-15

## 2023-03-14 ENCOUNTER — ANESTHESIA EVENT (OUTPATIENT)
Dept: ENDOSCOPY | Facility: HOSPITAL | Age: 48
End: 2023-03-14
Payer: COMMERCIAL

## 2023-03-14 NOTE — ANESTHESIA PREPROCEDURE EVALUATION
03/14/2023  Migue George is a 47 y.o., male.  Past Surgical History:   Procedure Laterality Date    testicular vein ligation      VASECTOMY       Past Medical History:   Diagnosis Date    Anxiety     Depression     Hyperlipidemia          Pre-op Assessment    I have reviewed the Patient Summary Reports.     I have reviewed the Nursing Notes. I have reviewed the NPO Status.   I have reviewed the Medications.     Review of Systems  Anesthesia Hx:  No problems with previous Anesthesia  Denies Family Hx of Anesthesia complications.   Denies Personal Hx of Anesthesia complications.   Social:  Former Smoker, Social Alcohol Use    Hematology/Oncology:  Hematology Normal   Oncology Normal     EENT/Dental:EENT/Dental Normal   Cardiovascular:  Cardiovascular Normal     Pulmonary:  Pulmonary Normal    Renal/:  Renal/ Normal     Hepatic/GI:  Hepatic/GI Normal Bowel Prep.    Musculoskeletal:  Musculoskeletal Normal    Neurological:  Neurology Normal    Endocrine:   Hypothyroidism  Obesity / BMI > 30  Dermatological:  Skin Normal    Psych:   Psychiatric History anxiety depression          Physical Exam  General: Well nourished, Cooperative, Alert and Oriented    Airway:  Mallampati: II / II  Mouth Opening: Normal  TM Distance: Normal  Tongue: Normal  Neck ROM: Normal ROM    Dental:  Intact        Anesthesia Plan  Type of Anesthesia, risks & benefits discussed:    Anesthesia Type: Gen Natural Airway  Intra-op Monitoring Plan: Standard ASA Monitors  Post Op Pain Control Plan: multimodal analgesia  Induction:  IV  Informed Consent: Informed consent signed with the Patient and all parties understand the risks and agree with anesthesia plan.  All questions answered. Patient consented to blood products? No  ASA Score: 2  Day of Surgery Review of History & Physical: H&P Update referred to the  surgeon/provider.    Ready For Surgery From Anesthesia Perspective.     .

## 2023-03-17 ENCOUNTER — HOSPITAL ENCOUNTER (OUTPATIENT)
Facility: HOSPITAL | Age: 48
Discharge: HOME | End: 2023-03-17
Attending: INTERNAL MEDICINE | Admitting: INTERNAL MEDICINE
Payer: COMMERCIAL

## 2023-03-17 ENCOUNTER — ANESTHESIA (OUTPATIENT)
Dept: ENDOSCOPY | Facility: HOSPITAL | Age: 48
End: 2023-03-17
Payer: COMMERCIAL

## 2023-03-17 VITALS
OXYGEN SATURATION: 96 % | RESPIRATION RATE: 18 BRPM | TEMPERATURE: 97 F | SYSTOLIC BLOOD PRESSURE: 116 MMHG | HEIGHT: 71 IN | HEART RATE: 65 BPM | WEIGHT: 218.25 LBS | BODY MASS INDEX: 30.56 KG/M2 | DIASTOLIC BLOOD PRESSURE: 56 MMHG

## 2023-03-17 DIAGNOSIS — K62.5 RECTAL BLEED: ICD-10-CM

## 2023-03-17 PROCEDURE — 37000008 HC ANESTHESIA 1ST 15 MINUTES: Performed by: INTERNAL MEDICINE

## 2023-03-17 PROCEDURE — D9220A PRA ANESTHESIA: ICD-10-PCS | Mod: ,,, | Performed by: NURSE ANESTHETIST, CERTIFIED REGISTERED

## 2023-03-17 PROCEDURE — 37000009 HC ANESTHESIA EA ADD 15 MINS: Performed by: INTERNAL MEDICINE

## 2023-03-17 PROCEDURE — 88305 TISSUE EXAM BY PATHOLOGIST: ICD-10-PCS | Mod: 26,,, | Performed by: PATHOLOGY

## 2023-03-17 PROCEDURE — 25000003 PHARM REV CODE 250: Performed by: NURSE ANESTHETIST, CERTIFIED REGISTERED

## 2023-03-17 PROCEDURE — 45385 COLONOSCOPY W/LESION REMOVAL: CPT | Performed by: INTERNAL MEDICINE

## 2023-03-17 PROCEDURE — 25000003 PHARM REV CODE 250: Performed by: INTERNAL MEDICINE

## 2023-03-17 PROCEDURE — 88305 TISSUE EXAM BY PATHOLOGIST: CPT | Performed by: PATHOLOGY

## 2023-03-17 PROCEDURE — 45385 PR COLONOSCOPY,REMV LESN,SNARE: ICD-10-PCS | Mod: ,,, | Performed by: INTERNAL MEDICINE

## 2023-03-17 PROCEDURE — 63600175 PHARM REV CODE 636 W HCPCS: Performed by: INTERNAL MEDICINE

## 2023-03-17 PROCEDURE — 27201089 HC SNARE, DISP (ANY): Performed by: INTERNAL MEDICINE

## 2023-03-17 PROCEDURE — 63600175 PHARM REV CODE 636 W HCPCS: Performed by: NURSE ANESTHETIST, CERTIFIED REGISTERED

## 2023-03-17 PROCEDURE — 88305 TISSUE EXAM BY PATHOLOGIST: CPT | Mod: 26,,, | Performed by: PATHOLOGY

## 2023-03-17 PROCEDURE — D9220A PRA ANESTHESIA: Mod: ,,, | Performed by: NURSE ANESTHETIST, CERTIFIED REGISTERED

## 2023-03-17 PROCEDURE — 45385 COLONOSCOPY W/LESION REMOVAL: CPT | Mod: ,,, | Performed by: INTERNAL MEDICINE

## 2023-03-17 RX ORDER — DEXTROMETHORPHAN/PSEUDOEPHED 2.5-7.5/.8
DROPS ORAL
Status: DISCONTINUED | OUTPATIENT
Start: 2023-03-17 | End: 2023-03-17 | Stop reason: HOSPADM

## 2023-03-17 RX ORDER — SODIUM CHLORIDE, SODIUM LACTATE, POTASSIUM CHLORIDE, CALCIUM CHLORIDE 600; 310; 30; 20 MG/100ML; MG/100ML; MG/100ML; MG/100ML
INJECTION, SOLUTION INTRAVENOUS CONTINUOUS
Status: DISCONTINUED | OUTPATIENT
Start: 2023-03-17 | End: 2023-03-17 | Stop reason: HOSPADM

## 2023-03-17 RX ORDER — PROPOFOL 10 MG/ML
VIAL (ML) INTRAVENOUS
Status: DISCONTINUED | OUTPATIENT
Start: 2023-03-17 | End: 2023-03-17

## 2023-03-17 RX ORDER — LIDOCAINE HYDROCHLORIDE 20 MG/ML
INJECTION, SOLUTION EPIDURAL; INFILTRATION; INTRACAUDAL; PERINEURAL
Status: DISCONTINUED | OUTPATIENT
Start: 2023-03-17 | End: 2023-03-17

## 2023-03-17 RX ADMIN — PROPOFOL 30 MG: 10 INJECTION, EMULSION INTRAVENOUS at 08:03

## 2023-03-17 RX ADMIN — PROPOFOL 20 MG: 10 INJECTION, EMULSION INTRAVENOUS at 09:03

## 2023-03-17 RX ADMIN — LIDOCAINE HYDROCHLORIDE 20 MG: 20 INJECTION, SOLUTION EPIDURAL; INFILTRATION; INTRACAUDAL; PERINEURAL at 08:03

## 2023-03-17 RX ADMIN — PROPOFOL 150 MG: 10 INJECTION, EMULSION INTRAVENOUS at 08:03

## 2023-03-17 RX ADMIN — SODIUM CHLORIDE, POTASSIUM CHLORIDE, SODIUM LACTATE AND CALCIUM CHLORIDE: 600; 310; 30; 20 INJECTION, SOLUTION INTRAVENOUS at 08:03

## 2023-03-17 NOTE — ANESTHESIA POSTPROCEDURE EVALUATION
Anesthesia Post Evaluation    Patient: Migue George    Procedure(s) Performed: Procedure(s) (LRB):  COLONOSCOPY (N/A)    Final Anesthesia Type: general      Patient location during evaluation: PACU  Patient participation: Yes- Able to Participate  Level of consciousness: awake and alert and oriented  Post-procedure vital signs: reviewed and stable  Pain management: adequate  Airway patency: patent    PONV status at discharge: No PONV  Anesthetic complications: no      Cardiovascular status: blood pressure returned to baseline, stable and hemodynamically stable  Respiratory status: unassisted  Hydration status: euvolemic  Follow-up not needed.          Vitals Value Taken Time   /55 03/17/23 0932   Temp 36.3 °C (97.4 °F) 03/17/23 0910   Pulse 66 03/17/23 0933   Resp 18 03/17/23 0933   SpO2 97 % 03/17/23 0933   Vitals shown include unvalidated device data.      Event Time   Out of Recovery 09:40:00         Pain/Usha Score: Usha Score: 10 (3/17/2023  9:30 AM)

## 2023-03-17 NOTE — PROVATION PATIENT INSTRUCTIONS
Discharge Summary/Instructions after an Endoscopic Procedure  Patient Name: Migue George  Patient MRN: [id number]  Patient YOB: 1975 Friday, March 17, 2023  Gregory Serrato MD  Dear patient,  As a result of recent federal legislation (The Federal Cures Act), you may   receive lab or pathology results from your procedure in your [facility]   account before your physician is able to contact you. Your physician or   their representative will relay the results to you with their   recommendations at their soonest availability.  Thank you,  RESTRICTIONS:  During your procedure today, you received medications for sedation.  These   medications may affect your judgment, balance and coordination.  Therefore,   for 24 hours, you have the following restrictions:   - DO NOT drive a car, operate machinery, make legal/financial decisions,   sign important papers or drink alcohol.    ACTIVITY:  Today: no heavy lifting, straining or running due to procedural   sedation/anesthesia.  The following day: return to full activity including work.  DIET:  Eat and drink normally unless instructed otherwise.     TREATMENT FOR COMMON SIDE EFFECTS:  - Mild abdominal pain, nausea, belching, bloating or excessive gas:  rest,   eat lightly and use a heating pad.  - Sore Throat: treat with throat lozenges and/or gargle with warm salt   water.  - Because air was used during the procedure, expelling large amounts of air   from your rectum or belching is normal.  - If a bowel prep was taken, you may not have a bowel movement for 1-3 days.    This is normal.  SYMPTOMS TO WATCH FOR AND REPORT TO YOUR PHYSICIAN:  1. Abdominal pain or bloating, other than gas cramps.  2. Chest pain.  3. Back pain.  4. Signs of infection such as: chills or fever occurring within 24 hours   after the procedure.  5. Rectal bleeding, which would show as bright red, maroon, or black stools.   (A tablespoon of blood from the rectum is not serious, especially  if   hemorrhoids are present.)  6. Vomiting.  7. Weakness or dizziness.  GO DIRECTLY TO THE NEAREST EMERGENCY ROOM IF YOU HAVE ANY OF THE FOLLOWING:      Difficulty breathing              Chills and/or fever over 101 F   Persistent vomiting and/or vomiting blood   Severe abdominal pain   Severe chest pain   Black, tarry stools   Bleeding- more than one tablespoon   Any other symptom or condition that you feel may need urgent attention  Your doctor recommends these additional instructions:  If any biopsies were taken, your doctors clinic will contact you in 1 to 2   weeks with any results.  - Discharge patient to home (via wheelchair).   - Resume previous diet.   - Continue present medications.   - Await pathology results.   - Repeat colonoscopy in 5 years for surveillance.   - Return to referring physician as previously scheduled.   - Patient has a contact number available for emergencies.  The signs and   symptoms of potential delayed complications were discussed with the   patient.  Return to normal activities tomorrow.  Written discharge   instructions were provided to the patient.   - Patient has a contact number available for emergencies.  The signs and   symptoms of potential delayed complications were discussed with the   patient.  Return to normal activities tomorrow.  Written discharge   instructions were provided to the patient.   - Patient has a contact number available for emergencies.  The signs and   symptoms of potential delayed complications were discussed with the   patient.  Return to normal activities tomorrow.  Written discharge   instructions were provided to the patient.  For questions, problems or results please call your physician Gregory Serrato MD at Work:  [phone]  If you have any questions about the above instructions, call the GI   department at [phone] or call the endoscopy unit at [phone]   from 7am until 3 pm.  [facility] [place]  ROOM PHONE NUMBER:   [phone]  IF A COMPLICATION OR EMERGENCY SITUATION ARISES AND YOU ARE UNABLE TO REACH   YOUR PHYSICIAN - GO DIRECTLY TO THE EMERGENCY ROOM.  I have read or have had read to me these discharge instructions for my   procedure and have received a written copy.  I understand these   instructions and will follow-up with my physician if I have any questions.     __________________________________       _____________________________________  Nurse Signature                                          Patient/Designated   Responsible Party Signature  MD Gregory Reyez MD  3/17/2023 9:19:26 AM  This report has been verified and signed electronically.  Dear patient,  As a result of recent federal legislation (The Federal Cures Act), you may   receive lab or pathology results from your procedure in your [facility]   account before your physician is able to contact you. Your physician or   their representative will relay the results to you with their   recommendations at their soonest availability.  Thank you,  PROVATION

## 2023-03-17 NOTE — DISCHARGE SUMMARY
[facility] - Endoscopy 1st Fl  Discharge Note  Short Stay    Procedure(s) (LRB):  COLONOSCOPY (N/A)      OUTCOME: Patient tolerated treatment/procedure well without complication and is now ready for discharge.    DISPOSITION: Home or Self Care    FINAL DIAGNOSIS:  <principal problem not specified>    FOLLOWUP: With primary care provider    DISCHARGE INSTRUCTIONS:  No discharge procedures on file.      Clinical Reference Documents Added to Patient Instructions         Document    COLON POLYPECTOMY DISCHARGE INSTRUCTIONS (ENGLISH)    DIVERTICULOSIS (ENGLISH)    HEMORRHOIDS DISCHARGE INSTRUCTIONS (ENGLISH)            TIME SPENT ON DISCHARGE: 20   minutes

## 2023-03-17 NOTE — PLAN OF CARE
Discharge instructions reviewed with patient and visitor. Handouts given & verbalized understanding with no further questions at this time. Dr Serrato spoke to pt at bedside, reviewed procedure and findings, answered questions. Made aware they are awaiting biopsy results with MD telephone number provided per AVS sheet. VSS on RA, no pain or nausea noted, tolerating po fluids, no complaints noted. Fall precautions reviewed, consents in chart, PIV removed at this time.

## 2023-03-17 NOTE — TRANSFER OF CARE
"Anesthesia Transfer of Care Note    Patient: Migue George    Procedure(s) Performed: Procedure(s) (LRB):  COLONOSCOPY (N/A)    Patient location: PACU    Anesthesia Type: general    Transport from OR: Transported from OR on room air with adequate spontaneous ventilation    Post pain: adequate analgesia    Post assessment: no apparent anesthetic complications and tolerated procedure well    Post vital signs: stable    Level of consciousness: sedated    Nausea/Vomiting: no nausea/vomiting    Complications: none    Transfer of care protocol was followed      Last vitals:   Visit Vitals  BP (!) 118/57 (BP Location: Left arm, Patient Position: Lying)   Pulse 71   Temp 36.3 °C (97.4 °F) (Temporal)   Resp 13   Ht 5' 11" (1.803 m)   Wt 99 kg (218 lb 4.1 oz)   SpO2 97%   BMI 30.44 kg/m²     "

## 2023-03-17 NOTE — H&P
Endoscopy History and Physical    PCP - Nito Oscar MD  Referring Physician - Gregory Serrato MD  [id number] [place] [address]      ASA - per anesthesia  Mallampati - per anesthesia  History of Anesthesia problems - no  Family history Anesthesia problems -  no   Plan of anesthesia - General    HPI  47 y.o. male    Planned Procedure: Colonoscopy  Diagnosis: hematochezia  Chief Complaint: Same as above    Personnel H/o colon polyps:yes  FH of colon cancer:no  Anticoagulation:no      ROS:  Constitutional: No fevers, chills, No weight loss  CV: No chest pain  Pulm: No cough, No shortness of breath  GI: see HPI    Medical History:  has a past medical history of Anxiety, Depression, Diverticulitis, and Hyperlipidemia.    Surgical History:  has a past surgical history that includes testicular vein ligation and Vasectomy.    Family History: family history includes Coronary artery disease in his maternal grandfather and maternal grandmother; Diabetes in his mother and unknown relative; Pancreatic cancer in his mother..    Social History:  reports that he quit smoking about 18 years ago. His smoking use included cigarettes. He has never used smokeless tobacco. He reports current alcohol use of about 0.8 standard drinks per week. He reports that he does not use drugs.    Review of patient's allergies indicates:  No Known Allergies    Medications:   Medications Prior to Admission   Medication Sig Dispense Refill Last Dose    atorvastatin (LIPITOR) 20 MG tablet Take 1 tablet (20 mg total) by mouth once daily. 90 tablet 3 Past Week    citalopram (CELEXA) 20 MG tablet Take 20 mg by mouth once daily.   3/16/2023    sertraline (ZOLOFT) 100 MG tablet Take 100 mg by mouth once daily.   3/16/2023    SYNTHROID 200 mcg tablet Take 1 tablet (200 mcg total) by mouth before breakfast. 90 tablet 3 3/16/2023    dextroamphetamine-amphetamine (ADDERALL) 10 mg Tab Take 1 tablet (10 mg total) by mouth 2 (two) times a  day. 60 tablet 0     pramoxine-hydrocortisone (PROCTOCREAM-HC) 1-1 % rectal cream Place rectally 3 (three) times daily. 3 each 2     tadalafiL (CIALIS) 5 MG tablet Take 1 tablet (5 mg total) by mouth once daily. 90 tablet 3 3/13/2023    valACYclovir (VALTREX) 1000 MG tablet Take 2 tablets (2,000 mg total) by mouth 2 (two) times daily. for 2 doses 30 tablet 0 3/13/2023       Physical Exam:    Vital Signs:   Vitals:    03/17/23 0806   BP: 131/76   Pulse: 65   Resp: 15   Temp: 98.1 °F (36.7 °C)       General Appearance: Well appearing in no acute distress  Abdomen: Soft, non tender, non distended with normal bowel sounds, no masses    Labs:  Lab Results   Component Value Date    WBC 5.11 10/18/2022    HGB 15.0 10/18/2022    HCT 45.8 10/18/2022     10/18/2022    CHOL 160 10/18/2022    TRIG 217 (H) 10/18/2022    HDL 42 10/18/2022    ALT 14 10/18/2022    AST 14 10/18/2022     10/18/2022    K 4.6 10/18/2022     10/18/2022    CREATININE 1.1 10/18/2022    BUN 13 10/18/2022    CO2 28 10/18/2022    TSH 2.037 10/18/2022    PSA 0.29 10/18/2022    HGBA1C 5.5 10/18/2022       I have explained the risks and benefits of this endoscopic procedure to the patient including but not limited to bleeding, inflammation, infection, perforation, and death.    SEDATION PLAN: per anesthesia       History reviewed, vital signs satisfactory, cardiopulmonary status satisfactory, sedation options, risks and plans have been discussed with the patient  All their questions were answered and the patient agrees to the sedation procedures as planned and the patient is deemed an appropriate candidate for the sedation as planned.     The risks, benefits and alternatives of the procedure were discussed with the patient in detail. This discussion was had in the presence of endoscopy staff. The risks include, risks of adverse reaction to sedation requiring the use of reversal agents, bleeding requiring blood transfusion, perforation  requiring surgical intervention and technical failure. Other risks include aspiration leading to respiratory distress and respiratory failure resulting in endotracheal intubation and mechanical ventilation including death. If anesthesia is being utilized for this procedure, it is up to the anesthesiologist to determine airway safety including elective endotracheal intubation. Questions were answered, they agree to proceed. There was no language barriers.       Procedure explained to patient, informed consent obtained and placed in chart.       Gregory Serrato MD

## 2023-03-23 LAB
FINAL PATHOLOGIC DIAGNOSIS: NORMAL
Lab: NORMAL

## 2023-06-15 DIAGNOSIS — E03.9 ACQUIRED HYPOTHYROIDISM: ICD-10-CM

## 2023-06-15 RX ORDER — LEVOTHYROXINE SODIUM 200 UG/1
200 TABLET ORAL
Qty: 90 TABLET | Refills: 1 | Status: SHIPPED | OUTPATIENT
Start: 2023-06-15 | End: 2023-06-23 | Stop reason: SDUPTHER

## 2023-06-15 NOTE — TELEPHONE ENCOUNTER
Refill Decision Note   Migue George  is requesting a refill authorization.  Brief Assessment and Rationale for Refill:  Approve     Medication Therapy Plan:  TSH WNL 10/18/2022      Comments:     No Care Gaps recommended.     Note composed:3:38 PM 06/15/2023

## 2023-06-15 NOTE — TELEPHONE ENCOUNTER
No care due was identified.  [facility] Embedded Care Due Messages. Reference number: [id number].   6/15/2023 2:42:09 PM CDT

## 2023-06-16 ENCOUNTER — TELEPHONE (OUTPATIENT)
Dept: PRIMARY CARE CLINIC | Facility: CLINIC | Age: 48
End: 2023-06-16
Payer: COMMERCIAL

## 2023-06-16 NOTE — TELEPHONE ENCOUNTER
----- Message from Samantha Dailey sent at 6/16/2023  2:55 PM CDT -----  Regarding: RX Refill  Contact: Migue  .Type:  RX Refill Request    Who Called: Migue  Refill or New Rx:  RX Name and Strength:  How is the patient currently taking it? (ex. 1XDay):  Is this a 30 day or 90 day RX:  Preferred Pharmacy with phone number:  Local or Mail Order: local   Ordering Provider: Celestina,   Would the patient rather a call back or a response via [facility]? call  Best Call Back Number: [phone]  Additional Information:  Migue want to be contacted about his medication he is at the pharmacy.

## 2023-06-23 ENCOUNTER — OFFICE VISIT (OUTPATIENT)
Dept: PRIMARY CARE CLINIC | Facility: CLINIC | Age: 48
End: 2023-06-23
Payer: COMMERCIAL

## 2023-06-23 ENCOUNTER — PATIENT MESSAGE (OUTPATIENT)
Dept: PSYCHIATRY | Facility: CLINIC | Age: 48
End: 2023-06-23
Payer: COMMERCIAL

## 2023-06-23 ENCOUNTER — LAB VISIT (OUTPATIENT)
Dept: LAB | Facility: HOSPITAL | Age: 48
End: 2023-06-23
Attending: FAMILY MEDICINE
Payer: COMMERCIAL

## 2023-06-23 VITALS
HEIGHT: 71 IN | TEMPERATURE: 98 F | SYSTOLIC BLOOD PRESSURE: 118 MMHG | WEIGHT: 232.81 LBS | HEART RATE: 72 BPM | BODY MASS INDEX: 32.59 KG/M2 | DIASTOLIC BLOOD PRESSURE: 74 MMHG

## 2023-06-23 DIAGNOSIS — Z12.5 ENCOUNTER FOR SCREENING FOR MALIGNANT NEOPLASM OF PROSTATE: ICD-10-CM

## 2023-06-23 DIAGNOSIS — F41.9 ANXIETY AND DEPRESSION: ICD-10-CM

## 2023-06-23 DIAGNOSIS — F90.9 ATTENTION DEFICIT HYPERACTIVITY DISORDER (ADHD), UNSPECIFIED ADHD TYPE: ICD-10-CM

## 2023-06-23 DIAGNOSIS — R73.03 PREDIABETES: ICD-10-CM

## 2023-06-23 DIAGNOSIS — Z00.00 ANNUAL PHYSICAL EXAM: Primary | ICD-10-CM

## 2023-06-23 DIAGNOSIS — E78.2 MIXED HYPERLIPIDEMIA: ICD-10-CM

## 2023-06-23 DIAGNOSIS — F32.A ANXIETY AND DEPRESSION: ICD-10-CM

## 2023-06-23 DIAGNOSIS — Z00.00 ANNUAL PHYSICAL EXAM: ICD-10-CM

## 2023-06-23 DIAGNOSIS — F52.0 DECREASED SEXUAL DESIRE: ICD-10-CM

## 2023-06-23 DIAGNOSIS — N52.9 ERECTILE DYSFUNCTION, UNSPECIFIED ERECTILE DYSFUNCTION TYPE: ICD-10-CM

## 2023-06-23 DIAGNOSIS — E03.9 ACQUIRED HYPOTHYROIDISM: ICD-10-CM

## 2023-06-23 LAB
ALBUMIN SERPL BCP-MCNC: 4.3 G/DL (ref 3.5–5.2)
ALP SERPL-CCNC: 68 U/L (ref 55–135)
ALT SERPL W/O P-5'-P-CCNC: 16 U/L (ref 10–44)
ANION GAP SERPL CALC-SCNC: 8 MMOL/L (ref 8–16)
AST SERPL-CCNC: 20 U/L (ref 10–40)
BASOPHILS # BLD AUTO: 0.04 K/UL (ref 0–0.2)
BASOPHILS NFR BLD: 0.7 % (ref 0–1.9)
BILIRUB SERPL-MCNC: 0.4 MG/DL (ref 0.1–1)
BUN SERPL-MCNC: 15 MG/DL (ref 6–20)
CALCIUM SERPL-MCNC: 9.9 MG/DL (ref 8.7–10.5)
CHLORIDE SERPL-SCNC: 104 MMOL/L (ref 95–110)
CHOLEST SERPL-MCNC: 159 MG/DL (ref 120–199)
CHOLEST/HDLC SERPL: 4 {RATIO} (ref 2–5)
CO2 SERPL-SCNC: 26 MMOL/L (ref 23–29)
CREAT SERPL-MCNC: 1.1 MG/DL (ref 0.5–1.4)
DIFFERENTIAL METHOD: NORMAL
EOSINOPHIL # BLD AUTO: 0.3 K/UL (ref 0–0.5)
EOSINOPHIL NFR BLD: 5.1 % (ref 0–8)
ERYTHROCYTE [DISTWIDTH] IN BLOOD BY AUTOMATED COUNT: 14.2 % (ref 11.5–14.5)
EST. GFR  (NO RACE VARIABLE): >60 ML/MIN/1.73 M^2
ESTIMATED AVG GLUCOSE: 111 MG/DL (ref 68–131)
GLUCOSE SERPL-MCNC: 89 MG/DL (ref 70–110)
HBA1C MFR BLD: 5.5 % (ref 4–5.6)
HCT VFR BLD AUTO: 46.5 % (ref 40–54)
HDLC SERPL-MCNC: 40 MG/DL (ref 40–75)
HDLC SERPL: 25.2 % (ref 20–50)
HGB BLD-MCNC: 15.6 G/DL (ref 14–18)
IMM GRANULOCYTES # BLD AUTO: 0.02 K/UL (ref 0–0.04)
IMM GRANULOCYTES NFR BLD AUTO: 0.3 % (ref 0–0.5)
LDLC SERPL CALC-MCNC: 63.4 MG/DL (ref 63–159)
LYMPHOCYTES # BLD AUTO: 1.5 K/UL (ref 1–4.8)
LYMPHOCYTES NFR BLD: 26.4 % (ref 18–48)
MCH RBC QN AUTO: 27.6 PG (ref 27–31)
MCHC RBC AUTO-ENTMCNC: 33.5 G/DL (ref 32–36)
MCV RBC AUTO: 82 FL (ref 82–98)
MONOCYTES # BLD AUTO: 0.5 K/UL (ref 0.3–1)
MONOCYTES NFR BLD: 8.9 % (ref 4–15)
NEUTROPHILS # BLD AUTO: 3.4 K/UL (ref 1.8–7.7)
NEUTROPHILS NFR BLD: 58.6 % (ref 38–73)
NONHDLC SERPL-MCNC: 119 MG/DL
NRBC BLD-RTO: 0 /100 WBC
PLATELET # BLD AUTO: 187 K/UL (ref 150–450)
PMV BLD AUTO: 12.5 FL (ref 9.2–12.9)
POTASSIUM SERPL-SCNC: 4.5 MMOL/L (ref 3.5–5.1)
PROT SERPL-MCNC: 7.3 G/DL (ref 6–8.4)
RBC # BLD AUTO: 5.66 M/UL (ref 4.6–6.2)
SODIUM SERPL-SCNC: 138 MMOL/L (ref 136–145)
T4 FREE SERPL-MCNC: 1 NG/DL (ref 0.71–1.51)
TRIGL SERPL-MCNC: 278 MG/DL (ref 30–150)
TSH SERPL DL<=0.005 MIU/L-ACNC: 12.51 UIU/ML (ref 0.4–4)
WBC # BLD AUTO: 5.73 K/UL (ref 3.9–12.7)

## 2023-06-23 PROCEDURE — 1159F MED LIST DOCD IN RCRD: CPT | Mod: CPTII,S$GLB,, | Performed by: FAMILY MEDICINE

## 2023-06-23 PROCEDURE — 99999 PR PBB SHADOW E&M-EST. PATIENT-LVL IV: CPT | Mod: PBBFAC,,, | Performed by: FAMILY MEDICINE

## 2023-06-23 PROCEDURE — 36415 COLL VENOUS BLD VENIPUNCTURE: CPT | Mod: PN | Performed by: FAMILY MEDICINE

## 2023-06-23 PROCEDURE — 83036 HEMOGLOBIN GLYCOSYLATED A1C: CPT | Performed by: FAMILY MEDICINE

## 2023-06-23 PROCEDURE — 1159F PR MEDICATION LIST DOCUMENTED IN MEDICAL RECORD: ICD-10-PCS | Mod: CPTII,S$GLB,, | Performed by: FAMILY MEDICINE

## 2023-06-23 PROCEDURE — 84403 ASSAY OF TOTAL TESTOSTERONE: CPT | Performed by: FAMILY MEDICINE

## 2023-06-23 PROCEDURE — 80053 COMPREHEN METABOLIC PANEL: CPT | Performed by: FAMILY MEDICINE

## 2023-06-23 PROCEDURE — 3008F BODY MASS INDEX DOCD: CPT | Mod: CPTII,S$GLB,, | Performed by: FAMILY MEDICINE

## 2023-06-23 PROCEDURE — 99396 PREV VISIT EST AGE 40-64: CPT | Mod: S$GLB,,, | Performed by: FAMILY MEDICINE

## 2023-06-23 PROCEDURE — 80061 LIPID PANEL: CPT | Performed by: FAMILY MEDICINE

## 2023-06-23 PROCEDURE — 85025 COMPLETE CBC W/AUTO DIFF WBC: CPT | Performed by: FAMILY MEDICINE

## 2023-06-23 PROCEDURE — 3074F PR MOST RECENT SYSTOLIC BLOOD PRESSURE < 130 MM HG: ICD-10-PCS | Mod: CPTII,S$GLB,, | Performed by: FAMILY MEDICINE

## 2023-06-23 PROCEDURE — 99999 PR PBB SHADOW E&M-EST. PATIENT-LVL IV: ICD-10-PCS | Mod: PBBFAC,,, | Performed by: FAMILY MEDICINE

## 2023-06-23 PROCEDURE — 3074F SYST BP LT 130 MM HG: CPT | Mod: CPTII,S$GLB,, | Performed by: FAMILY MEDICINE

## 2023-06-23 PROCEDURE — 84439 ASSAY OF FREE THYROXINE: CPT | Performed by: FAMILY MEDICINE

## 2023-06-23 PROCEDURE — 3078F DIAST BP <80 MM HG: CPT | Mod: CPTII,S$GLB,, | Performed by: FAMILY MEDICINE

## 2023-06-23 PROCEDURE — 84153 ASSAY OF PSA TOTAL: CPT | Performed by: FAMILY MEDICINE

## 2023-06-23 PROCEDURE — 3008F PR BODY MASS INDEX (BMI) DOCUMENTED: ICD-10-PCS | Mod: CPTII,S$GLB,, | Performed by: FAMILY MEDICINE

## 2023-06-23 PROCEDURE — 84443 ASSAY THYROID STIM HORMONE: CPT | Performed by: FAMILY MEDICINE

## 2023-06-23 PROCEDURE — 3078F PR MOST RECENT DIASTOLIC BLOOD PRESSURE < 80 MM HG: ICD-10-PCS | Mod: CPTII,S$GLB,, | Performed by: FAMILY MEDICINE

## 2023-06-23 PROCEDURE — 99396 PR PREVENTIVE VISIT,EST,40-64: ICD-10-PCS | Mod: S$GLB,,, | Performed by: FAMILY MEDICINE

## 2023-06-23 RX ORDER — DEXTROAMPHETAMINE SACCHARATE, AMPHETAMINE ASPARTATE, DEXTROAMPHETAMINE SULFATE AND AMPHETAMINE SULFATE 2.5; 2.5; 2.5; 2.5 MG/1; MG/1; MG/1; MG/1
1 TABLET ORAL 2 TIMES DAILY
Qty: 60 TABLET | Refills: 0 | Status: SHIPPED | OUTPATIENT
Start: 2023-08-22 | End: 2023-09-21

## 2023-06-23 RX ORDER — DEXTROAMPHETAMINE SACCHARATE, AMPHETAMINE ASPARTATE, DEXTROAMPHETAMINE SULFATE AND AMPHETAMINE SULFATE 2.5; 2.5; 2.5; 2.5 MG/1; MG/1; MG/1; MG/1
1 TABLET ORAL 2 TIMES DAILY
Qty: 60 TABLET | Refills: 0 | Status: SHIPPED | OUTPATIENT
Start: 2023-07-23 | End: 2023-08-22

## 2023-06-23 RX ORDER — SERTRALINE HYDROCHLORIDE 100 MG/1
100 TABLET, FILM COATED ORAL DAILY
Qty: 90 TABLET | Refills: 3 | Status: SHIPPED | OUTPATIENT
Start: 2023-06-23

## 2023-06-23 RX ORDER — TADALAFIL 5 MG/1
5 TABLET ORAL DAILY PRN
Qty: 90 TABLET | Refills: 3 | Status: SHIPPED | OUTPATIENT
Start: 2023-06-23 | End: 2024-06-22

## 2023-06-23 RX ORDER — LEVOTHYROXINE SODIUM 200 UG/1
200 TABLET ORAL
Qty: 90 TABLET | Refills: 3 | Status: SHIPPED | OUTPATIENT
Start: 2023-06-23 | End: 2024-06-22

## 2023-06-23 RX ORDER — ATORVASTATIN CALCIUM 20 MG/1
20 TABLET, FILM COATED ORAL DAILY
Qty: 90 TABLET | Refills: 3 | Status: SHIPPED | OUTPATIENT
Start: 2023-06-23

## 2023-06-23 RX ORDER — CITALOPRAM 20 MG/1
20 TABLET, FILM COATED ORAL DAILY
Qty: 90 TABLET | Refills: 3 | Status: SHIPPED | OUTPATIENT
Start: 2023-06-23

## 2023-06-23 RX ORDER — DEXTROAMPHETAMINE SACCHARATE, AMPHETAMINE ASPARTATE, DEXTROAMPHETAMINE SULFATE AND AMPHETAMINE SULFATE 2.5; 2.5; 2.5; 2.5 MG/1; MG/1; MG/1; MG/1
1 TABLET ORAL 2 TIMES DAILY
Qty: 60 TABLET | Refills: 0 | Status: SHIPPED | OUTPATIENT
Start: 2023-06-23 | End: 2023-07-23

## 2023-06-23 RX ORDER — TADALAFIL 5 MG/1
5 TABLET ORAL DAILY
Qty: 30 TABLET | Refills: 0 | Status: SHIPPED | OUTPATIENT
Start: 2023-06-23

## 2023-06-23 NOTE — PROGRESS NOTES
[facility]       [address] [place] [address]      Phone: [phone]      Fax: [phone]    Nito Oscar MD    Office Visit  06/23/2023    Follow-up      47-year-old gentleman presents today for annual wellness exam, med refills.     Patient states that he feels fine today.  Denies chest pains, shortness of breath.  Denies fever, chills, body aches.  Denies coughing, sneezing, URI type symptoms.  States appetite is normal.  States bowel movements have been questionable ever since his colonoscopy.  Has intermittent episodes of constipation.  Takes fiber supplements to keep himself regular. Denies any urinary issues.     Has ADHD.  Completed formal testing with psychologist.  Currently takes Adderall 10 mg, twice a day, but not every day.  Only takes when he is working.  He states that this dose, regimen work well for him.  He takes it in the morning before work.  Helps keep him focused, concentrating on tasks at hand.  No issues sleeping.  Appetite is normal.    Still under a lot of stress.  Was seeing psychology regularly, but the psychologist that managed his medications is no longer with that group.  He has also stopped seeing the psychologist who was doing therapy.  Would like to get reestablished with someone here, at [facility].  Previously, counseling and medication did seem to be helping.  He would really like to get restarted.  Currently taking Zoloft 100 mg daily, along with Celexa 20 mg daily.    His anxiety/depression medications were causing some sexual side effects.  Started taking Cialis 5 mg daily.  States this is working well, helping with ED and desire issues.  Had testosterone levels checked recently, still waiting for the results.  In the past, he was diagnosed with low testosterone levels and was on replacement therapy.  Approximately 2 years ago, he took a holiday from the testosterone treatments.      Also has hypothyroidism.   Currently takes Synthroid 200 mcg daily.  No issues with this medication.    Also has elevated cholesterol levels.  Takes Lipitor 20 mg daily.  No issues with this medicine.    PMH: Diverticulitis, prediabetes, HLD, Hashimoto's/thyroid use, ADHD, anx/dep.  PSH: Colonoscopy 2020, repeat in 3 years.  Scrotal vein surgery in .  FMH: Dad-unknown.  Mom passed away from pancreatic cancer in her 50s, hypertension, thyroid issues, alcohol abuse.  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 Stevens (med mgmt), Dr. Carolina (counseling)  GI: Dr. Burciaga    Colon:  2023.  Repeat five years ()          Immunizations:  Immunization History   Administered Date(s) Administered    Influenza (FLUBLOK) - Quadrivalent - Recombinant - PF *Preferred* (egg allergy) 2020    Influenza - Intradermal - Quadrivalent - PF 2014    Influenza - Trivalent - PF (ADULT) 2008, 10/13/2010, 10/21/2011, 2012    Influenza Split 2008, 10/13/2010, 10/21/2011, 2012    Tdap 2012, 2016       Care Teams:  Patient Care Team:  Nito Oscar MD as PCP - General (Family Medicine)  Elizabeth Yañez LPN as Care Coordinator (Internal Medicine)    Medical History:  Past Medical History:   Diagnosis Date    Anxiety     Depression     Diverticulitis     Hyperlipidemia        Social History:  Social History     Socioeconomic History    Marital status:     Number of children: 4   Occupational History    Occupation: Vascular [employer]      Employer: [employer]   Tobacco Use    Smoking status: Former     Types: Cigarettes     Quit date: 2004     Years since quittin.9    Smokeless tobacco: Never   Substance and Sexual Activity    Alcohol use: Yes     Alcohol/week: 0.8 standard drinks     Types: 1 Standard drinks or equivalent per week    Drug use: No    Sexual activity:  Yes       Alcohol History:  Social History     Substance and Sexual Activity   Alcohol Use Yes    Alcohol/week: 0.8 standard drinks    Types: 1 Standard drinks or equivalent per week       Tobacco History:  Social History     Tobacco Use   Smoking Status Former    Types: Cigarettes    Quit date: 2004    Years since quittin.9   Smokeless Tobacco Never       Family History:  Family History   Problem Relation Age of Onset    Pancreatic cancer Mother     Diabetes Mother     Diabetes Unknown     Coronary artery disease Maternal Grandfather     Coronary artery disease Maternal Grandmother        Surgical History:  Past Surgical History:   Procedure Laterality Date    COLONOSCOPY N/A 3/17/2023    Procedure: COLONOSCOPY;  Surgeon: Gregory Serrato MD;  Location: [facility];  Service: Endoscopy;  Laterality: N/A;    testicular vein ligation      VASECTOMY         Allergies:  Review of patient's allergies indicates:  No Known Allergies    Medications:    Current Outpatient Medications:     pramoxine-hydrocortisone (PROCTOCREAM-HC) 1-1 % rectal cream, Place rectally 3 (three) times daily., Disp: 3 each, Rfl: 2    atorvastatin (LIPITOR) 20 MG tablet, Take 1 tablet (20 mg total) by mouth once daily., Disp: 90 tablet, Rfl: 3    citalopram (CELEXA) 20 MG tablet, Take 1 tablet (20 mg total) by mouth once daily., Disp: 90 tablet, Rfl: 3    dextroamphetamine-amphetamine (ADDERALL) 10 mg Tab, Take 1 tablet (10 mg total) by mouth 2 (two) times a day., Disp: 60 tablet, Rfl: 0    [START ON 2023] dextroamphetamine-amphetamine (ADDERALL) 10 mg Tab, Take 1 tablet (10 mg total) by mouth 2 (two) times a day., Disp: 60 tablet, Rfl: 0    [START ON 2023] dextroamphetamine-amphetamine (ADDERALL) 10 mg Tab, Take 1 tablet (10 mg total) by mouth 2 (two) times a day., Disp: 60 tablet, Rfl: 0    levothyroxine (SYNTHROID) 200 MCG tablet, Take 1 tablet (200 mcg total) by mouth before breakfast., Disp: 90 tablet, Rfl: 3     "sertraline (ZOLOFT) 100 MG tablet, Take 1 tablet (100 mg total) by mouth once daily., Disp: 90 tablet, Rfl: 3    tadalafiL (CIALIS) 5 MG tablet, Take 1 tablet (5 mg total) by mouth once daily., Disp: 30 tablet, Rfl: 0    tadalafiL (CIALIS) 5 MG tablet, Take 1 tablet (5 mg total) by mouth daily as needed for Erectile Dysfunction., Disp: 90 tablet, Rfl: 3    valACYclovir (VALTREX) 1000 MG tablet, Take 2 tablets (2,000 mg total) by mouth 2 (two) times daily. for 2 doses, Disp: 30 tablet, Rfl: 0    Health Maintenance:  Health Maintenance   Topic Date Due    Lipid Panel  10/18/2023    TETANUS VACCINE  09/02/2026    Hepatitis C Screening  Completed       Screening Questions  1.  Do you smoke? No  2.  In the past month have you been bothered by feeling "down", depressed or hopeless? No  3.  In the past month, have you experienced a loss of interest or pleasure in doing things? No  4.  In the past 3 months, on any single occasion have you had 5 or more drinks containing alcohol? No  5.  Regarding your use of alcohol, have you ever felt you should cut down on your drinking? No  6.  Are you sexually active? Yes  7   Do you exercise?  intermittently    Counseling  The patient was counseled regarding diet and exercise, motor vehicle safety, sun exposure, and use of vitamins and supplements.  The patient was counseled regarding Living Will/Durable Power-Of-.  The patient was given information regarding the dangers of smoking and the overuse of alcohol.    Review of Systems   Constitutional:  Negative for activity change, appetite change, chills and fever.   HENT:  Negative for congestion, postnasal drip, rhinorrhea, sore throat and trouble swallowing.    Respiratory:  Negative for cough and shortness of breath.    Cardiovascular:  Negative for chest pain and palpitations.   Gastrointestinal:  Negative for abdominal pain, constipation, diarrhea, nausea and vomiting.   Genitourinary:  Negative for difficulty urinating. " "  Musculoskeletal:  Negative for arthralgias and myalgias.   Skin:  Negative for color change and rash.   Neurological:  Negative for headaches.   All other systems reviewed and are negative.     Objective   Vitals:    06/23/23 0846   BP: 118/74   Pulse: 72   Temp: 97.9 °F (36.6 °C)   Weight: 105.6 kg (232 lb 12.9 oz)   Height: 5' 11" (1.803 m)     Physical Exam  Vitals and nursing note reviewed.   Constitutional:       General: He is not in acute distress.     Appearance: Normal appearance.   HENT:      Head: Normocephalic and atraumatic.      Right Ear: Tympanic membrane, ear canal and external ear normal.      Left Ear: Tympanic membrane, ear canal and external ear normal.      Nose: Nose normal. No congestion or rhinorrhea.      Mouth/Throat:      Mouth: Mucous membranes are moist.      Pharynx: Oropharynx is clear. No oropharyngeal exudate or posterior oropharyngeal erythema.   Eyes:      Extraocular Movements: Extraocular movements intact.      Conjunctiva/sclera: Conjunctivae normal.      Pupils: Pupils are equal, round, and reactive to light.   Cardiovascular:      Rate and Rhythm: Normal rate and regular rhythm.   Pulmonary:      Effort: Pulmonary effort is normal.      Breath sounds: No wheezing, rhonchi or rales.   Musculoskeletal:         General: Normal range of motion.      Cervical back: Normal range of motion.   Lymphadenopathy:      Cervical: No cervical adenopathy.   Skin:     General: Skin is warm and dry.   Neurological:      General: No focal deficit present.      Mental Status: He is alert.        Recent Results (from the past 4368 hour(s))   Specimen to Pathology, Surgery Gastrointestinal tract    Collection Time: 03/17/23  9:07 AM   Result Value Ref Range    Final Pathologic Diagnosis       RELIAPATH DIAGNOSIS:  A.   COLON, CECAL POLYP, POLYPECTOMY:         - Tubular adenoma, negative for high grade dysplasia.  B.   COLON, SIGMOID POLYP, POLYPECTOMY:         - Hyperplastic polyp.  Delores POWER" "BRANDON Barron  Report attached.  Performing site:  [address] [address]  "Disclaimer:  This case diagnosis was rendered completely by the outside  consultation pathologist and the case is electronically signed by an [facility]  pathologist listed below solely to release the report into the medical  record."      Disclaimer       Unless the case is a 'gross only' or additional testing only, the final  diagnosis for each specimen is based on a microscopic examination of  appropriate tissue sections.         Dimple Camarena was seen today for follow-up.    Diagnoses and all orders for this visit:    Annual physical exam  -     CBC Auto Differential; Future  -     Comprehensive Metabolic Panel; Future  -     TSH; Future  -     Lipid Panel; Future  -     PSA, Screening; Future  -     Hemoglobin A1C; Future    Acquired hypothyroidism  -     TSH; Future  -     levothyroxine (SYNTHROID) 200 MCG tablet; Take 1 tablet (200 mcg total) by mouth before breakfast.    Attention deficit hyperactivity disorder (ADHD), unspecified ADHD type  -     CBC Auto Differential; Future  -     Comprehensive Metabolic Panel; Future  -     Ambulatory referral/consult to Psychiatry; Future  -     dextroamphetamine-amphetamine (ADDERALL) 10 mg Tab; Take 1 tablet (10 mg total) by mouth 2 (two) times a day.  -     dextroamphetamine-amphetamine (ADDERALL) 10 mg Tab; Take 1 tablet (10 mg total) by mouth 2 (two) times a day.  -     dextroamphetamine-amphetamine (ADDERALL) 10 mg Tab; Take 1 tablet (10 mg total) by mouth 2 (two) times a day.    Anxiety and depression  -     CBC Auto Differential; Future  -     Comprehensive Metabolic Panel; Future  -     Ambulatory referral/consult to Psychiatry; Future  -     citalopram (CELEXA) 20 MG tablet; Take 1 tablet (20 mg total) by mouth once daily.  -     sertraline (ZOLOFT) 100 MG tablet; Take 1 tablet (100 mg total) by mouth once daily.    Mixed hyperlipidemia  -     Lipid Panel; " Future  -     atorvastatin (LIPITOR) 20 MG tablet; Take 1 tablet (20 mg total) by mouth once daily.    Prediabetes  -     Hemoglobin A1C; Future    Erectile dysfunction, unspecified erectile dysfunction type  -     tadalafiL (CIALIS) 5 MG tablet; Take 1 tablet (5 mg total) by mouth daily as needed for Erectile Dysfunction.    Encounter for screening for malignant neoplasm of prostate  -     PSA, Screening; Future    Decreased sexual desire  -     tadalafiL (CIALIS) 5 MG tablet; Take 1 tablet (5 mg total) by mouth once daily.    Physically, everything looks good today.      Med refills sent.      Referral to Psychology/Psychiatry to discuss therapy, medications for anxiety/depression.    Screening labs, as above.    Follow-up:  Follow up in about 3 months (around 9/23/2023) for med refill, and in 1 year for annual exam.    Signed by:  Nito Oscar MD

## 2023-06-23 NOTE — PATIENT INSTRUCTIONS
Physically, everything looks pretty good today.      Let us go ahead and get your annual, screening, blood work done today.  We will contact you with those results as soon as they are available.      Refills of your medicines have been sent to specific pharmacies...    Atorvastatin, levothyroxine, citalopram, sertraline have all been sent to [facility].      Adderall has been sent to [facility].      A 30 day supply of Cialis (tadalafil) was sent to [facility].  Ninety day supply was sent to the [facility] pharmacy that we discussed.  Go to this website to create an account:  [email or web address]  They will ship the medication directly to you.    I am placing a referral to our Psychiatry/Psychology Department to get you scheduled for some counseling, medication management.  The department should contact you to schedule an appointment.  It can take a few months for there to be an opening.  If we want to see someone sooner, we can always try using the [facility] in [place].  Let me know if you would like info on them.    Continue to eat a healthy diet.  Be careful with portion sizes.  Includes lots of fresh fruits, vegetables, whole grains, lean proteins.  See info below.    Keep hydrated.  Be sure to drink at least 8-10, 8 oz, glasses of water every day.    Stay active.  Try to do some sort of physical activity every day.  Nothing outrageous, just try walking for 10-15 minutes each day.

## 2023-06-24 LAB
COMPLEXED PSA SERPL-MCNC: 0.2 NG/ML (ref 0–4)
TESTOST SERPL-MCNC: 345 NG/DL (ref 304–1227)

## 2023-07-19 ENCOUNTER — HOSPITAL ENCOUNTER (OUTPATIENT)
Dept: RADIOLOGY | Facility: CLINIC | Age: 48
Discharge: HOME | End: 2023-07-19
Payer: COMMERCIAL

## 2023-07-19 ENCOUNTER — OFFICE VISIT (OUTPATIENT)
Dept: URGENT CARE | Facility: CLINIC | Age: 48
End: 2023-07-19
Payer: COMMERCIAL

## 2023-07-19 ENCOUNTER — TELEPHONE (OUTPATIENT)
Dept: PRIMARY CARE CLINIC | Facility: CLINIC | Age: 48
End: 2023-07-19
Payer: COMMERCIAL

## 2023-07-19 VITALS
TEMPERATURE: 99 F | HEIGHT: 71 IN | RESPIRATION RATE: 14 BRPM | SYSTOLIC BLOOD PRESSURE: 121 MMHG | DIASTOLIC BLOOD PRESSURE: 57 MMHG | HEART RATE: 70 BPM | OXYGEN SATURATION: 95 % | BODY MASS INDEX: 32.48 KG/M2 | WEIGHT: 232 LBS

## 2023-07-19 DIAGNOSIS — T14.8XXA MUSCLE STRAIN: ICD-10-CM

## 2023-07-19 DIAGNOSIS — M54.50 ACUTE MIDLINE LOW BACK PAIN WITHOUT SCIATICA: Primary | ICD-10-CM

## 2023-07-19 LAB
BILIRUB UR QL STRIP: NEGATIVE
GLUCOSE UR QL STRIP: NEGATIVE
KETONES UR QL STRIP: NEGATIVE
LEUKOCYTE ESTERASE UR QL STRIP: NEGATIVE
PH, POC UA: 5.5
POC BLOOD, URINE: NEGATIVE
POC NITRATES, URINE: NEGATIVE
PROT UR QL STRIP: NEGATIVE
SP GR UR STRIP: 1.01 (ref 1–1.03)
UROBILINOGEN UR STRIP-ACNC: NORMAL (ref 0.3–2.2)

## 2023-07-19 PROCEDURE — 81003 POCT URINALYSIS, DIPSTICK, AUTOMATED, W/O SCOPE: ICD-10-PCS | Mod: QW,S$GLB,,

## 2023-07-19 PROCEDURE — 96372 THER/PROPH/DIAG INJ SC/IM: CPT | Mod: S$GLB,,,

## 2023-07-19 PROCEDURE — 72100 X-RAY EXAM L-S SPINE 2/3 VWS: CPT | Mod: S$GLB,,, | Performed by: RADIOLOGY

## 2023-07-19 PROCEDURE — 99214 OFFICE O/P EST MOD 30 MIN: CPT | Mod: 25,S$GLB,,

## 2023-07-19 PROCEDURE — 99214 PR OFFICE/OUTPT VISIT, EST, LEVL IV, 30-39 MIN: ICD-10-PCS | Mod: 25,S$GLB,,

## 2023-07-19 PROCEDURE — 81003 URINALYSIS AUTO W/O SCOPE: CPT | Mod: QW,S$GLB,,

## 2023-07-19 PROCEDURE — 96372 PR INJECTION,THERAP/PROPH/DIAG2ST, IM OR SUBCUT: ICD-10-PCS | Mod: S$GLB,,,

## 2023-07-19 PROCEDURE — 72100 XR LUMBAR SPINE 2 OR 3 VIEWS: ICD-10-PCS | Mod: S$GLB,,, | Performed by: RADIOLOGY

## 2023-07-19 RX ORDER — MELOXICAM 15 MG/1
15 TABLET ORAL DAILY
Qty: 7 TABLET | Refills: 0 | Status: SHIPPED | OUTPATIENT
Start: 2023-07-19 | End: 2023-07-26

## 2023-07-19 RX ORDER — CYCLOBENZAPRINE HCL 5 MG
5 TABLET ORAL 3 TIMES DAILY PRN
Qty: 21 TABLET | Refills: 0 | Status: SHIPPED | OUTPATIENT
Start: 2023-07-19 | End: 2023-07-26

## 2023-07-19 RX ORDER — KETOROLAC TROMETHAMINE 30 MG/ML
30 INJECTION, SOLUTION INTRAMUSCULAR; INTRAVENOUS ONCE
Status: DISCONTINUED | OUTPATIENT
Start: 2023-07-19 | End: 2023-07-19

## 2023-07-19 RX ORDER — KETOROLAC TROMETHAMINE 30 MG/ML
30 INJECTION, SOLUTION INTRAMUSCULAR; INTRAVENOUS ONCE
Status: COMPLETED | OUTPATIENT
Start: 2023-07-19 | End: 2023-07-19

## 2023-07-19 RX ADMIN — KETOROLAC TROMETHAMINE 30 MG: 30 INJECTION, SOLUTION INTRAMUSCULAR; INTRAVENOUS at 02:07

## 2023-07-19 NOTE — PROGRESS NOTES
"Subjective:      Patient ID: Migue George is a 47 y.o. male.    Vitals:  height is 5' 11" (1.803 m) and weight is 105.2 kg (232 lb). His tympanic temperature is 98.5 °F (36.9 °C). His blood pressure is 121/57 (abnormal) and his pulse is 70. His respiration is 14 and oxygen saturation is 95%.     Chief Complaint: Back Pain (Lower back)    Patient presents today with lower back pain that began last Thursday/friday. No injury or trauma. Patient states the pain is in the middle of his low back. It seems to be triggered by position changes. Patient has tried stretches and heat and has taken some of his old hydrocodone from a past surgery. He denies radicular pain, paresthesias, saddle anesthesia, bladder/bowel dysfunction. No urinary symptoms. No history of back surgeries.   Last dose of hydrocodone was yesterday.  He has also taken combo tylenol/advil OTC with no improvement    Back Pain  This is a new problem. The current episode started in the past 7 days. The problem occurs constantly. The problem has been gradually worsening since onset. The pain is present in the lumbar spine and sacro-iliac. The quality of the pain is described as shooting and stabbing. Radiates to: flank. The pain is at a severity of 8/10 (when triggered). The symptoms are aggravated by twisting and position. Pertinent negatives include no abdominal pain, bladder incontinence, bowel incontinence, dysuria, leg pain, numbness, pelvic pain, perianal numbness or tingling. He has tried heat and home exercises (hydrocodone) for the symptoms. The treatment provided no relief.     Gastrointestinal:  Negative for abdominal pain and bowel incontinence.   Genitourinary:  Negative for dysuria, bladder incontinence and pelvic pain.   Musculoskeletal:  Positive for back pain.   Neurological:  Negative for numbness.    Objective:     Physical Exam   Constitutional:  Non-toxic appearance. He does not appear ill. No distress.   HENT:   Head: Normocephalic " "and atraumatic.   Ears:   Right Ear: External ear normal.   Left Ear: External ear normal.   Eyes: Conjunctivae are normal.   Cardiovascular: Normal rate.   Pulmonary/Chest: Effort normal.   Abdominal: Normal appearance.      Comments: No CVAT   Musculoskeletal:      Comments: Patient reports "discomfort" with palpation of L spine. No palpable step off's or creptius. No overlying skin changes  SLR WNL ROMANA  ROMANA UE AND LE NVIT  Moves all extremities with normal strength, tone, range of motion   Good peripheral pulses   Neurological: He is alert.   Skin: Skin is warm and dry.     Assessment:     1. Acute midline low back pain without sciatica    2. Muscle strain        Plan:       Acute midline low back pain without sciatica  -     POCT Urinalysis, Dipstick, Automated, W/O Scope  -     XR LUMBAR SPINE 2 OR 3 VIEWS; Future; Expected date: 07/19/2023  -     Discontinue: ketorolac injection 30 mg  -     meloxicam (MOBIC) 15 MG tablet; Take 1 tablet (15 mg total) by mouth once daily. for 7 days  Dispense: 7 tablet; Refill: 0  -     cyclobenzaprine (FLEXERIL) 5 MG tablet; Take 1 tablet (5 mg total) by mouth 3 (three) times daily as needed for Muscle spasms.  Dispense: 21 tablet; Refill: 0  -     ketorolac injection 30 mg    Muscle strain          Medical Decision Making:   Differential Diagnosis:   Pulled muscle vs muscle strain, herniated disc, arthritis, lumbar radiculopathy, cauda equina, nephrolithiasis, pyelonephritis  Clinical Tests:   Lab Tests: Ordered and Reviewed  The following lab test(s) were unremarkable: Urinalysis       <> Summary of Lab: UA WNL  No hematuria or proteinuria to suggest nephrolithiasis  No nitrates, leukocytes to suggest UTI or pyelonephritis  Radiological Study: Ordered and Reviewed  Urgent Care Management:  Patient here for evaluation of midline low back pain, nontraumatic, beginning last Thursday/Friday.  There is no radiculopathy, paresthesias, bladder/bowel dysfunction, saddle anesthesias. " " He is not an IV drug user.  He has no urinary symptoms.  UA obtained and is normal.  X-ray of the lumbar spine obtained as patient reporting "discomfort" with palpation of the lumbar spine.  X-ray is remarkable for degenerative changes but no acute findings.  Differential as above.  Discussed with patient likely diagnosis of pulled muscle versus a muscle strain.  Will treat as such with Toradol IM while in clinic.  Rx provided for 1 week of Mobic and Flexeril to use p.r.n..  Proper use and side effects of these medications discussed.  Handout also given on lower back exercises to do as tolerated at home.  He was instructed to follow-up with his primary care if he has no improvement.  Strict return to clinic and ED precautions discussed as well for any new or worsening of his symptoms.  Patient verbalized understanding to the plan and is ambulatory out of the clinic without difficulty.           "

## 2023-07-19 NOTE — PATIENT INSTRUCTIONS
You were given Toradol in clinic today. Please do not take any NSAIDs including ibuprofen, naproxen, aleve, advil, motrin, clexa, mobic, or others for the next 24 hrs.   If you have abdominal pain, vomiting, or bloody stool please go to the ER immediately.     Start Mobic tomorrow.  Do not take any additional ibuprofen with this.      Flexeril Warning:   The medication you have been prescribed may cause drowsiness and impair your judgement.  Therefore, you should avoid driving, climbing, using machinery, etc., so as not to increase your risk of injury.  Do NOT drink any alcohol while on this medication(s).        Please drink plenty of fluids    Please get plenty of rest        If you are not allergic and have no contraindications, take tylenol (acetominophen) for fever control, chills, or body aches every 4 hours. Do not exceed 4000 mg/ day.If you are not allergic and have no contraindications, take Motrin (Ibuprofen) every 4 hours for fever, chills, pain or inflammation. Do not exceed 2400 mg/day. You can alternate taking tylenol and motrin.        If you were prescribed a narcotic, muscle relaxer, or controlled substance, do not drive or operate heavy equipment or machinery while taking these medications.       You must understand that you've received an Urgent Care treatment only and that you may be released before all your medical problems are known or treated. You, the patient, will arrange for follow up care as instructed.    Follow up with your PCP or specialty clinic as directed in the next 1-2 weeks if not improved or as needed. You can call [phone] to schedule an appointment with the appropriate provider.    If your condition worsens we recommend that you receive another evaluation at the emergency room immediately or contact your primary medical clinic's after hours call service to discuss your concerns.    Please go to the Emergency Department for any concerns or worsening of condition.

## 2023-07-22 ENCOUNTER — TELEPHONE (OUTPATIENT)
Dept: URGENT CARE | Facility: CLINIC | Age: 48
End: 2023-07-22
Payer: COMMERCIAL

## 2023-07-22 NOTE — TELEPHONE ENCOUNTER
Courtesy call made to patient to follow up after his visit with us. Patient states he is doing better.

## 2023-08-04 DIAGNOSIS — E78.1 HYPERTRIGLYCERIDEMIA: ICD-10-CM

## 2023-08-04 DIAGNOSIS — E78.2 MIXED HYPERLIPIDEMIA: ICD-10-CM

## 2023-08-04 DIAGNOSIS — E03.9 ACQUIRED HYPOTHYROIDISM: Primary | ICD-10-CM

## 2023-08-04 RX ORDER — ICOSAPENT ETHYL 1000 MG/1
2 CAPSULE ORAL 2 TIMES DAILY
Qty: 360 CAPSULE | Refills: 3 | Status: SHIPPED | OUTPATIENT
Start: 2023-08-04

## 2023-08-04 NOTE — PROGRESS NOTES
Mr. Camarena,     You should be able to see the results of your recent blood work in [facility].  Overall, most things look okay.      Blood counts were all fine.  Electrolytes, kidney function, and liver function were all normal.  Your A1c was in the normal range, so no signs of diabetes.  Your PSA level was fine.  Your testosterone levels were normal.    Interestingly, your TSH level is quite a bit elevated.  It is a big jump from where it was nine months ago.  Have you been taking your thyroid medicine?  Anyway, we are scheduled to get together in a couple months.  I am going to place an order to recheck your thyroid levels prior to that visit.  That way we can discuss the results in person.  If it stays elevated, we may want to increase the dose of your Synthroid.    Cholesterol was also a little off.  Total cholesterol, HDL, and LDL numbers were perfect.  Triglycerides, however, were still elevated at 278.  These should be under 200.  You already take the Lipitor.  Let us keep doing that.  I am tempted to add a prescription great fish oil, Vascepa, to your list.  This targets triglycerides and can bring the levels down.  I will send it to the pharmacy today.  Start taking it daily and we can recheck your lipids at the same time as the thyroid.  When we do that blood work, be sure you are fasting so we get the best numbers.

## 2023-09-15 ENCOUNTER — LAB VISIT (OUTPATIENT)
Dept: LAB | Facility: HOSPITAL | Age: 48
End: 2023-09-15
Attending: FAMILY MEDICINE
Payer: COMMERCIAL

## 2023-09-15 DIAGNOSIS — E78.1 HYPERTRIGLYCERIDEMIA: ICD-10-CM

## 2023-09-15 DIAGNOSIS — E78.2 MIXED HYPERLIPIDEMIA: ICD-10-CM

## 2023-09-15 DIAGNOSIS — E03.9 ACQUIRED HYPOTHYROIDISM: ICD-10-CM

## 2023-09-15 LAB
CHOLEST SERPL-MCNC: 154 MG/DL (ref 120–199)
CHOLEST/HDLC SERPL: 4.3 {RATIO} (ref 2–5)
HDLC SERPL-MCNC: 36 MG/DL (ref 40–75)
HDLC SERPL: 23.4 % (ref 20–50)
LDLC SERPL CALC-MCNC: 83.4 MG/DL (ref 63–159)
NONHDLC SERPL-MCNC: 118 MG/DL
TRIGL SERPL-MCNC: 173 MG/DL (ref 30–150)
TSH SERPL DL<=0.005 MIU/L-ACNC: 2.44 UIU/ML (ref 0.4–4)

## 2023-09-15 PROCEDURE — 84443 ASSAY THYROID STIM HORMONE: CPT | Performed by: FAMILY MEDICINE

## 2023-09-15 PROCEDURE — 36415 COLL VENOUS BLD VENIPUNCTURE: CPT | Mod: PN | Performed by: FAMILY MEDICINE

## 2023-09-15 PROCEDURE — 80061 LIPID PANEL: CPT | Performed by: FAMILY MEDICINE

## 2023-09-22 ENCOUNTER — OFFICE VISIT (OUTPATIENT)
Dept: PRIMARY CARE CLINIC | Facility: CLINIC | Age: 48
End: 2023-09-22
Payer: COMMERCIAL

## 2023-09-22 VITALS
SYSTOLIC BLOOD PRESSURE: 122 MMHG | BODY MASS INDEX: 33.06 KG/M2 | DIASTOLIC BLOOD PRESSURE: 78 MMHG | WEIGHT: 236.13 LBS | TEMPERATURE: 98 F | HEART RATE: 79 BPM | HEIGHT: 71 IN

## 2023-09-22 DIAGNOSIS — F90.9 ATTENTION DEFICIT HYPERACTIVITY DISORDER (ADHD), UNSPECIFIED ADHD TYPE: Primary | ICD-10-CM

## 2023-09-22 DIAGNOSIS — B07.9 VIRAL WART ON FINGER: ICD-10-CM

## 2023-09-22 PROCEDURE — 99999 PR PBB SHADOW E&M-EST. PATIENT-LVL V: ICD-10-PCS | Mod: PBBFAC,,, | Performed by: FAMILY MEDICINE

## 2023-09-22 PROCEDURE — 99999 PR PBB SHADOW E&M-EST. PATIENT-LVL V: CPT | Mod: PBBFAC,,, | Performed by: FAMILY MEDICINE

## 2023-09-22 PROCEDURE — 3078F DIAST BP <80 MM HG: CPT | Mod: CPTII,S$GLB,, | Performed by: FAMILY MEDICINE

## 2023-09-22 PROCEDURE — 99215 OFFICE O/P EST HI 40 MIN: CPT | Mod: S$GLB,,, | Performed by: FAMILY MEDICINE

## 2023-09-22 PROCEDURE — 1159F PR MEDICATION LIST DOCUMENTED IN MEDICAL RECORD: ICD-10-PCS | Mod: CPTII,S$GLB,, | Performed by: FAMILY MEDICINE

## 2023-09-22 PROCEDURE — 3044F HG A1C LEVEL LT 7.0%: CPT | Mod: CPTII,S$GLB,, | Performed by: FAMILY MEDICINE

## 2023-09-22 PROCEDURE — 1159F MED LIST DOCD IN RCRD: CPT | Mod: CPTII,S$GLB,, | Performed by: FAMILY MEDICINE

## 2023-09-22 PROCEDURE — 3074F PR MOST RECENT SYSTOLIC BLOOD PRESSURE < 130 MM HG: ICD-10-PCS | Mod: CPTII,S$GLB,, | Performed by: FAMILY MEDICINE

## 2023-09-22 PROCEDURE — 3074F SYST BP LT 130 MM HG: CPT | Mod: CPTII,S$GLB,, | Performed by: FAMILY MEDICINE

## 2023-09-22 PROCEDURE — 3008F BODY MASS INDEX DOCD: CPT | Mod: CPTII,S$GLB,, | Performed by: FAMILY MEDICINE

## 2023-09-22 PROCEDURE — 99215 PR OFFICE/OUTPT VISIT, EST, LEVL V, 40-54 MIN: ICD-10-PCS | Mod: S$GLB,,, | Performed by: FAMILY MEDICINE

## 2023-09-22 PROCEDURE — 3008F PR BODY MASS INDEX (BMI) DOCUMENTED: ICD-10-PCS | Mod: CPTII,S$GLB,, | Performed by: FAMILY MEDICINE

## 2023-09-22 PROCEDURE — 3078F PR MOST RECENT DIASTOLIC BLOOD PRESSURE < 80 MM HG: ICD-10-PCS | Mod: CPTII,S$GLB,, | Performed by: FAMILY MEDICINE

## 2023-09-22 PROCEDURE — 3044F PR MOST RECENT HEMOGLOBIN A1C LEVEL <7.0%: ICD-10-PCS | Mod: CPTII,S$GLB,, | Performed by: FAMILY MEDICINE

## 2023-09-22 RX ORDER — DEXTROAMPHETAMINE SACCHARATE, AMPHETAMINE ASPARTATE, DEXTROAMPHETAMINE SULFATE AND AMPHETAMINE SULFATE 2.5; 2.5; 2.5; 2.5 MG/1; MG/1; MG/1; MG/1
1 TABLET ORAL 2 TIMES DAILY
Qty: 60 TABLET | Refills: 0 | Status: SHIPPED | OUTPATIENT
Start: 2023-11-21 | End: 2023-12-22

## 2023-09-22 RX ORDER — DEXTROAMPHETAMINE SACCHARATE, AMPHETAMINE ASPARTATE, DEXTROAMPHETAMINE SULFATE AND AMPHETAMINE SULFATE 2.5; 2.5; 2.5; 2.5 MG/1; MG/1; MG/1; MG/1
1 TABLET ORAL 2 TIMES DAILY
Qty: 60 TABLET | Refills: 0 | Status: SHIPPED | OUTPATIENT
Start: 2023-09-22 | End: 2023-10-22

## 2023-09-22 RX ORDER — DEXTROAMPHETAMINE SACCHARATE, AMPHETAMINE ASPARTATE, DEXTROAMPHETAMINE SULFATE AND AMPHETAMINE SULFATE 2.5; 2.5; 2.5; 2.5 MG/1; MG/1; MG/1; MG/1
1 TABLET ORAL 2 TIMES DAILY
Qty: 60 TABLET | Refills: 0 | Status: SHIPPED | OUTPATIENT
Start: 2023-10-22 | End: 2023-11-21

## 2023-09-22 NOTE — PATIENT INSTRUCTIONS
Physically, everything looks good today.      Your recent blood work looks fine.    Thyroid numbers have come back to the normal range so we do not need to change your dose of the medication.  Still numbers look fine.  Triglycerides have greatly improved.  Continue taking the Vascepa daily.    Refills of Adderall sent to the pharmacy.  Continue taking it, as directed.      Like you to keep your appointment with the psychiatrist in January.  My only worry is that your unit may go into a turnaround about that same time.  You may want to put in a time off request sooner, rather than later.    Alternatively, you could try calling the [facility] at [phone].  They are located at [address] [place] [address], in [place].  They have several psychologists on staff who can do counseling/therapy.  If they take your insurance and need a referral from me, please let me know.  I will be happy to send one over to them.  They may also be able to see you prior to January.    The spot on your finger looks to be a small, viral, wart.  You can try OTC wart remover cream (compound W) to see if that gets rid of it.  You can also try applying a piece of duct tape to it daily for the next two weeks.  Often that can help it fall off.  Alternatively, we can get you in with the dermatologist who can have it removed.  Referral placed today.    Continue to eat a healthy diet.  Be careful with portion sizes.  Includes lots of fresh fruits, vegetables, whole grains, lean proteins.  See info below.    Keep hydrated.  Be sure to drink at least 8-10, 8 oz, glasses of water every day.    Stay active.  Try to do some sort of physical activity every day.  Nothing outrageous, just try walking for 10-15 minutes each day.

## 2023-09-23 NOTE — PROGRESS NOTES
"    [facility]       [address] [place] [address]      Phone: [phone]      Fax: [phone]    Nito Oscar MD                Office Visit  09/22/2023        Subjective      HPI:  Migue George is a 48 y.o. male presents today in clinic for "Follow-up  ."     48-year-old gentleman presents today for med refills, discuss other issues.     Overall, he feels fine today.  Denies chest pains, shortness of breath.  Denies fever, chills, body aches.  Denies coughing, sneezing, URI type symptoms.  States appetite is normal.  States bowel movements have been questionable ever since his colonoscopy.  Has intermittent episodes of constipation.  Takes fiber supplements to keep himself regular. Denies any urinary issues.     He mentions that he has a spot on the little finger of his right hand.  Right at the base of the little finger.  Does not hurt, but feels annoying.  Has been there for several weeks.    Has ADHD.  Currently takes Adderall 10 mg, twice a day.  Generally only takes when he has to work.  Dose is working well for him.  Helps with focus, concentration.  Does not affect sleep, appetite.    Still has stress, anxiety.  Able to get an appointment scheduled with Psychiatry, but not until January.  Would still like to explore other options for counseling, therapy, medication management.    Has hypothyroidism.  Currently takes Synthroid 200 mcg daily.  No issues with this medication.  At previous visit his TSH levels had risen up to 12.  Prior to this visit we rechecked them, numbers were back in the normal range.    Also has elevated cholesterol/triglycerides levels.  Takes Lipitor 20 mg daily, along with Vascepa.  Tolerating these fine.  Triglycerides have come down from 278 to 173.     PMH: Diverticulitis, prediabetes, HLD, Hashimoto's/thyroid use, ADHD, anx/dep.  PSH: Colonoscopy June/2020, repeat in 3 years.  Scrotal vein surgery in 1995.  FMH: " Dad-unknown.  Mom passed away from pancreatic cancer in her 50s, hypertension, thyroid issues, alcohol abuse.  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 Stevens (med mgmt), Dr. Carolina (counseling)  GI: Dr. Burciaga    Colon:  03/17/2023.  Repeat five years (2028)            The following were updated and reviewed by myself in the chart: medications, past medical history, past surgical history, family history, social history, and allergies.     Medications:  Current Outpatient Medications on File Prior to Visit   Medication Sig Dispense Refill    atorvastatin (LIPITOR) 20 MG tablet Take 1 tablet (20 mg total) by mouth once daily. 90 tablet 3    citalopram (CELEXA) 20 MG tablet Take 1 tablet (20 mg total) by mouth once daily. 90 tablet 3    icosapent ethyL (VASCEPA) 1 gram Cap Take 2 capsules (2 g total) by mouth 2 (two) times daily. 360 capsule 3    levothyroxine (SYNTHROID) 200 MCG tablet Take 1 tablet (200 mcg total) by mouth before breakfast. 90 tablet 3    pramoxine-hydrocortisone (PROCTOCREAM-HC) 1-1 % rectal cream Place rectally 3 (three) times daily. 3 each 2    sertraline (ZOLOFT) 100 MG tablet Take 1 tablet (100 mg total) by mouth once daily. 90 tablet 3    tadalafiL (CIALIS) 5 MG tablet Take 1 tablet (5 mg total) by mouth once daily. 30 tablet 0    tadalafiL (CIALIS) 5 MG tablet Take 1 tablet (5 mg total) by mouth daily as needed for Erectile Dysfunction. 90 tablet 3    valACYclovir (VALTREX) 1000 MG tablet Take 2 tablets (2,000 mg total) by mouth 2 (two) times daily. for 2 doses 30 tablet 0     No current facility-administered medications on file prior to visit.        PMHx:  Past Medical History:   Diagnosis Date    Anxiety     Depression     Diverticulitis     Hyperlipidemia       Patient Active Problem List    Diagnosis Date Noted    Diarrhea of presumed infectious origin 01/18/2023    Hemorrhoids  2023    Diverticulosis 2023    History of colon polyps 2023    Blood in stool 2023    Hypothyroid 2015    Hyperlipidemia         PSHx:  Past Surgical History:   Procedure Laterality Date    COLONOSCOPY N/A 3/17/2023    Procedure: COLONOSCOPY;  Surgeon: Gregory Serrato MD;  Location: [facility];  Service: Endoscopy;  Laterality: N/A;    testicular vein ligation      VASECTOMY          FHx:  Family History   Problem Relation Age of Onset    Pancreatic cancer Mother     Diabetes Mother     Diabetes Unknown     Coronary artery disease Maternal Grandfather     Coronary artery disease Maternal Grandmother         Social:  Social History     Socioeconomic History    Marital status:     Number of children: 4   Occupational History    Occupation: refinery      Employer: [employer]   Tobacco Use    Smoking status: Former     Current packs/day: 0.00     Types: Cigarettes     Quit date: 2004     Years since quittin.1    Smokeless tobacco: Never   Substance and Sexual Activity    Alcohol use: Yes     Alcohol/week: 0.8 standard drinks of alcohol     Types: 1 Standard drinks or equivalent per week    Drug use: No    Sexual activity: Yes        Allergies:  Review of patient's allergies indicates:  No Known Allergies     ROS:  Review of Systems   Constitutional:  Negative for activity change, appetite change, chills and fever.   HENT:  Negative for congestion, postnasal drip, rhinorrhea, sore throat and trouble swallowing.    Respiratory:  Negative for cough and shortness of breath.    Cardiovascular:  Negative for chest pain and palpitations.   Gastrointestinal:  Negative for abdominal pain, constipation, diarrhea, nausea and vomiting.   Genitourinary:  Negative for difficulty urinating.   Musculoskeletal:  Negative for arthralgias and myalgias.   Skin:  Negative for color change and rash.   Neurological:  Negative for headaches.   All other systems  "reviewed and are negative.         Objective      /78   Pulse 79   Temp 98.1 °F (36.7 °C)   Ht 5' 11" (1.803 m)   Wt 107.1 kg (236 lb 1.8 oz)   BMI 32.93 kg/m²   Ht Readings from Last 3 Encounters:   09/22/23 5' 11" (1.803 m)   07/19/23 5' 11" (1.803 m)   06/23/23 5' 11" (1.803 m)     Wt Readings from Last 3 Encounters:   09/22/23 107.1 kg (236 lb 1.8 oz)   07/19/23 105.2 kg (232 lb)   06/23/23 105.6 kg (232 lb 12.9 oz)       PHYSICAL EXAM:  Physical Exam  Vitals and nursing note reviewed.   Constitutional:       General: He is not in acute distress.     Appearance: Normal appearance.   HENT:      Head: Normocephalic and atraumatic.      Right Ear: Tympanic membrane, ear canal and external ear normal.      Left Ear: Tympanic membrane, ear canal and external ear normal.      Nose: Nose normal. No congestion or rhinorrhea.      Mouth/Throat:      Mouth: Mucous membranes are moist.      Pharynx: Oropharynx is clear. No oropharyngeal exudate or posterior oropharyngeal erythema.   Eyes:      Extraocular Movements: Extraocular movements intact.      Conjunctiva/sclera: Conjunctivae normal.      Pupils: Pupils are equal, round, and reactive to light.   Cardiovascular:      Rate and Rhythm: Normal rate and regular rhythm.   Pulmonary:      Effort: Pulmonary effort is normal.      Breath sounds: No wheezing, rhonchi or rales.   Musculoskeletal:         General: Normal range of motion.        Hands:       Cervical back: Normal range of motion.      Comments: Verrucous appearing lesion on the palmar surface, proximal phalanx of the little finger of his right hand.   Lymphadenopathy:      Cervical: No cervical adenopathy.   Skin:     General: Skin is warm and dry.   Neurological:      General: No focal deficit present.      Mental Status: He is alert.              LABS / IMAGING:  Recent Results (from the past 4368 hour(s))   CBC Auto Differential    Collection Time: 06/23/23  9:26 AM   Result Value Ref Range    WBC " 5.73 3.90 - 12.70 K/uL    RBC 5.66 4.60 - 6.20 M/uL    Hemoglobin 15.6 14.0 - 18.0 g/dL    Hematocrit 46.5 40.0 - 54.0 %    MCV 82 82 - 98 fL    MCH 27.6 27.0 - 31.0 pg    MCHC 33.5 32.0 - 36.0 g/dL    RDW 14.2 11.5 - 14.5 %    Platelets 187 150 - 450 K/uL    MPV 12.5 9.2 - 12.9 fL    Immature Granulocytes 0.3 0.0 - 0.5 %    Gran # (ANC) 3.4 1.8 - 7.7 K/uL    Immature Grans (Abs) 0.02 0.00 - 0.04 K/uL    Lymph # 1.5 1.0 - 4.8 K/uL    Mono # 0.5 0.3 - 1.0 K/uL    Eos # 0.3 0.0 - 0.5 K/uL    Baso # 0.04 0.00 - 0.20 K/uL    nRBC 0 0 /100 WBC    Gran % 58.6 38.0 - 73.0 %    Lymph % 26.4 18.0 - 48.0 %    Mono % 8.9 4.0 - 15.0 %    Eosinophil % 5.1 0.0 - 8.0 %    Basophil % 0.7 0.0 - 1.9 %    Differential Method Automated    Comprehensive Metabolic Panel    Collection Time: 06/23/23  9:26 AM   Result Value Ref Range    Sodium 138 136 - 145 mmol/L    Potassium 4.5 3.5 - 5.1 mmol/L    Chloride 104 95 - 110 mmol/L    CO2 26 23 - 29 mmol/L    Glucose 89 70 - 110 mg/dL    BUN 15 6 - 20 mg/dL    Creatinine 1.1 0.5 - 1.4 mg/dL    Calcium 9.9 8.7 - 10.5 mg/dL    Total Protein 7.3 6.0 - 8.4 g/dL    Albumin 4.3 3.5 - 5.2 g/dL    Total Bilirubin 0.4 0.1 - 1.0 mg/dL    Alkaline Phosphatase 68 55 - 135 U/L    AST 20 10 - 40 U/L    ALT 16 10 - 44 U/L    eGFR >60.0 >60 mL/min/1.73 m^2    Anion Gap 8 8 - 16 mmol/L   TSH    Collection Time: 06/23/23  9:26 AM   Result Value Ref Range    TSH 12.509 (H) 0.400 - 4.000 uIU/mL   Lipid Panel    Collection Time: 06/23/23  9:26 AM   Result Value Ref Range    Cholesterol 159 120 - 199 mg/dL    Triglycerides 278 (H) 30 - 150 mg/dL    HDL 40 40 - 75 mg/dL    LDL Cholesterol 63.4 63.0 - 159.0 mg/dL    HDL/Cholesterol Ratio 25.2 20.0 - 50.0 %    Total Cholesterol/HDL Ratio 4.0 2.0 - 5.0    Non-HDL Cholesterol 119 mg/dL   PSA, Screening    Collection Time: 06/23/23  9:26 AM   Result Value Ref Range    PSA, Screen 0.20 0.00 - 4.00 ng/mL   Hemoglobin A1C    Collection Time: 06/23/23  9:26 AM   Result  Value Ref Range    Hemoglobin A1C 5.5 4.0 - 5.6 %    Estimated Avg Glucose 111 68 - 131 mg/dL   Testosterone    Collection Time: 06/23/23  9:26 AM   Result Value Ref Range    Testosterone, Total 345 304 - 1227 ng/dL   T4, Free    Collection Time: 06/23/23  9:26 AM   Result Value Ref Range    Free T4 1.00 0.71 - 1.51 ng/dL   POCT Urinalysis, Dipstick, Automated, W/O Scope    Collection Time: 07/19/23  2:00 PM   Result Value Ref Range    POC Blood, Urine Negative Negative    POC Bilirubin, Urine Negative Negative    POC Urobilinogen, Urine normal 0.3 - 2.2    POC Ketones, Urine Negative Negative    POC Protein, Urine Negative Negative    POC Nitrates, Urine Negative Negative    POC Glucose, Urine Negative Negative    pH, UA 5.5     POC Specific Gravity, Urine 1.015 1.003 - 1.029    POC Leukocytes, Urine Negative Negative   TSH    Collection Time: 09/15/23  9:13 AM   Result Value Ref Range    TSH 2.445 0.400 - 4.000 uIU/mL   LIPID PANEL    Collection Time: 09/15/23  9:13 AM   Result Value Ref Range    Cholesterol 154 120 - 199 mg/dL    Triglycerides 173 (H) 30 - 150 mg/dL    HDL 36 (L) 40 - 75 mg/dL    LDL Cholesterol 83.4 63.0 - 159.0 mg/dL    HDL/Cholesterol Ratio 23.4 20.0 - 50.0 %    Total Cholesterol/HDL Ratio 4.3 2.0 - 5.0    Non-HDL Cholesterol 118 mg/dL         Assessment    1. Attention deficit hyperactivity disorder (ADHD), unspecified ADHD type    2. Viral wart on finger          Plan    Migue was seen today for follow-up.    Diagnoses and all orders for this visit:    Attention deficit hyperactivity disorder (ADHD), unspecified ADHD type  -     dextroamphetamine-amphetamine (ADDERALL) 10 mg Tab; Take 1 tablet (10 mg total) by mouth 2 (two) times a day.  -     dextroamphetamine-amphetamine (ADDERALL) 10 mg Tab; Take 1 tablet (10 mg total) by mouth 2 (two) times a day.  -     dextroamphetamine-amphetamine (ADDERALL) 10 mg Tab; Take 1 tablet (10 mg total) by mouth 2 (two) times a day.    Viral wart on  finger  -     Ambulatory referral/consult to Dermatology; Future    Overall, seems to be doing pretty well.  Continue current medications.      Refill of Adderall sent to pharmacy today.  He should have refills available on his other medications.    Lesion on finger appears to be a viral wart.  Recommended he try applying duct tape daily.  Instructions given.  Can follow up with Dermatology if no improvement.      FOLLOW-UP:  Follow up in about 3 months (around 12/22/2023) for med refill.    I spent a total of 45 minutes face to face and non-face to face on the date of this visit.This includes time preparing to see the patient (eg, review of tests, notes), obtaining and/or reviewing additional history from an independent historian and/or outside medical records, documenting clinical information in the electronic health record, independently interpreting results and/or communicating results to the patient/family/caregiver, or care coordinator.    Signed by:  Nito Oscar MD

## 2023-10-19 ENCOUNTER — PATIENT MESSAGE (OUTPATIENT)
Dept: HEPATOLOGY | Facility: CLINIC | Age: 48
End: 2023-10-19
Payer: COMMERCIAL

## 2023-11-07 DIAGNOSIS — E78.1 HYPERTRIGLYCERIDEMIA: ICD-10-CM

## 2023-11-08 RX ORDER — ICOSAPENT ETHYL 1000 MG/1
CAPSULE ORAL
Refills: 0 | OUTPATIENT
Start: 2023-11-08

## 2023-11-08 NOTE — TELEPHONE ENCOUNTER
[facility] Note  Quick DC. Inappropriate Request   Refill request requires further review by MD: NO   Medication Therapy Plan: Pharmacy is requesting new script(s) for the following medications without required information, (sig/ frequency/qty/etc)     [facility] action(s):  Quick Discontinue      Duplicate Pended Encounter(s)/ Last Prescribed Details:    Pharmacies have been requesting medications for patients without required information, (sig, frequency, qty, etc.). In addition, requests are sent for medication(s) pt. are currently not taking, and medications patients have never taken.    We have spoken to the pharmacies about these request types and advised their teams previously that we are unable to assess these New Script requests and require all details for these requests. This is a known issue and has been reported.        Medication related problems are not assessed for [facility].   Medication Reconciliation Completed? NO Were there pending details that required adjustment? NO     Automatic Epic Generated Protocol Data Below:   Requested Prescriptions   Pending Prescriptions Disp Refills    icosapent ethyL (VASCEPA) 1 gram Cap [Pharmacy Med Name: ICOSAPENT ETHYL CAPS 1GM]  0              Appointments      Date Provider   Last Visit   9/22/2023 Nito Oscar MD   Next Visit   12/22/2023 Nito Oscar MD        Note composed:7:43 AM 11/08/2023

## 2023-11-08 NOTE — TELEPHONE ENCOUNTER
No care due was identified.  [facility] Embedded Care Due Messages. Reference number: [id number].   11/07/2023 7:16:57 PM CST

## 2023-11-15 ENCOUNTER — OFFICE VISIT (OUTPATIENT)
Dept: DERMATOLOGY | Facility: CLINIC | Age: 48
End: 2023-11-15
Payer: COMMERCIAL

## 2023-11-15 DIAGNOSIS — B07.9 VIRAL WART ON FINGER: ICD-10-CM

## 2023-11-15 DIAGNOSIS — L21.9 SEBORRHEIC DERMATITIS: Primary | ICD-10-CM

## 2023-11-15 DIAGNOSIS — L73.1 PSEUDOFOLLICULITIS BARBAE: ICD-10-CM

## 2023-11-15 PROCEDURE — 99204 PR OFFICE/OUTPT VISIT, NEW, LEVL IV, 45-59 MIN: ICD-10-PCS | Mod: 25,S$GLB,, | Performed by: PHYSICIAN ASSISTANT

## 2023-11-15 PROCEDURE — 99204 OFFICE O/P NEW MOD 45 MIN: CPT | Mod: 25,S$GLB,, | Performed by: PHYSICIAN ASSISTANT

## 2023-11-15 PROCEDURE — 17110 DESTRUCTION B9 LES UP TO 14: CPT | Mod: S$GLB,,, | Performed by: PHYSICIAN ASSISTANT

## 2023-11-15 PROCEDURE — 3044F PR MOST RECENT HEMOGLOBIN A1C LEVEL <7.0%: ICD-10-PCS | Mod: CPTII,S$GLB,, | Performed by: PHYSICIAN ASSISTANT

## 2023-11-15 PROCEDURE — 1160F PR REVIEW ALL MEDS BY PRESCRIBER/CLIN PHARMACIST DOCUMENTED: ICD-10-PCS | Mod: CPTII,S$GLB,, | Performed by: PHYSICIAN ASSISTANT

## 2023-11-15 PROCEDURE — 17110 PR DESTRUCTION BENIGN LESIONS UP TO 14: ICD-10-PCS | Mod: S$GLB,,, | Performed by: PHYSICIAN ASSISTANT

## 2023-11-15 PROCEDURE — 1160F RVW MEDS BY RX/DR IN RCRD: CPT | Mod: CPTII,S$GLB,, | Performed by: PHYSICIAN ASSISTANT

## 2023-11-15 PROCEDURE — 1159F PR MEDICATION LIST DOCUMENTED IN MEDICAL RECORD: ICD-10-PCS | Mod: CPTII,S$GLB,, | Performed by: PHYSICIAN ASSISTANT

## 2023-11-15 PROCEDURE — 3044F HG A1C LEVEL LT 7.0%: CPT | Mod: CPTII,S$GLB,, | Performed by: PHYSICIAN ASSISTANT

## 2023-11-15 PROCEDURE — 1159F MED LIST DOCD IN RCRD: CPT | Mod: CPTII,S$GLB,, | Performed by: PHYSICIAN ASSISTANT

## 2023-11-15 PROCEDURE — 99999 PR PBB SHADOW E&M-EST. PATIENT-LVL III: ICD-10-PCS | Mod: PBBFAC,,, | Performed by: PHYSICIAN ASSISTANT

## 2023-11-15 PROCEDURE — 99999 PR PBB SHADOW E&M-EST. PATIENT-LVL III: CPT | Mod: PBBFAC,,, | Performed by: PHYSICIAN ASSISTANT

## 2023-11-15 RX ORDER — KETOCONAZOLE 20 MG/ML
SHAMPOO, SUSPENSION TOPICAL
Qty: 120 ML | Refills: 11 | Status: SHIPPED | OUTPATIENT
Start: 2023-11-15

## 2023-11-15 RX ORDER — KETOCONAZOLE 20 MG/G
CREAM TOPICAL 2 TIMES DAILY
Qty: 60 G | Refills: 1 | Status: SHIPPED | OUTPATIENT
Start: 2023-11-15

## 2023-11-15 RX ORDER — CLINDAMYCIN PHOSPHATE 10 MG/ML
SOLUTION TOPICAL 2 TIMES DAILY
Qty: 60 EACH | Refills: 1 | Status: SHIPPED | OUTPATIENT
Start: 2023-11-15 | End: 2024-11-14

## 2023-11-15 RX ORDER — TRIAMCINOLONE ACETONIDE 0.25 MG/G
CREAM TOPICAL 2 TIMES DAILY
Qty: 15 G | Refills: 0 | Status: SHIPPED | OUTPATIENT
Start: 2023-11-15

## 2023-11-15 NOTE — PROGRESS NOTES
"  Subjective:      Patient ID:  Migue George is a 48 y.o. male who presents for   Chief Complaint   Patient presents with    Warts     Right finger     History of Present Illness: The patient presents with chief complaint of "wart".  Location: right palm  Duration: several months  Signs/Symptoms: raised, thickened    Prior treatments: clipping at home, otc wart med    C/o dryness and flaking of the eyebrows, temples, around mouth. Uses TAC 0.025% cream prn (daughter's rx). Using dandruff shampoo.           Review of Systems    Objective:   Physical Exam   Constitutional: He appears well-developed and well-nourished. No distress.   Neurological: He is alert and oriented to person, place, and time. He is not disoriented.   Psychiatric: He has a normal mood and affect.   Skin:   Areas Examined (abnormalities noted in diagram):   Head / Face Inspection Performed  Neck Inspection Performed  Chest / Axilla Inspection Performed  Back Inspection Performed  RUE Inspected  LUE Inspection Performed  Nails and Digits Inspection Performed                Diagram Legend     Erythematous scaling macule/papule c/w actinic keratosis       Vascular papule c/w angioma      Pigmented verrucoid papule/plaque c/w seborrheic keratosis      Yellow umbilicated papule c/w sebaceous hyperplasia      Irregularly shaped tan macule c/w lentigo     1-2 mm smooth white papules consistent with Milia      Movable subcutaneous cyst with punctum c/w epidermal inclusion cyst      Subcutaneous movable cyst c/w pilar cyst      Firm pink to brown papule c/w dermatofibroma      Pedunculated fleshy papule(s) c/w skin tag(s)      Evenly pigmented macule c/w junctional nevus     Mildly variegated pigmented, slightly irregular-bordered macule c/w mildly atypical nevus      Flesh colored to evenly pigmented papule c/w intradermal nevus       Pink pearly papule/plaque c/w basal cell carcinoma      Erythematous hyperkeratotic cursted plaque c/w SCC      " Surgical scar with no sign of skin cancer recurrence      Open and closed comedones      Inflammatory papules and pustules      Verrucoid papule consistent consistent with wart     Erythematous eczematous patches and plaques     Dystrophic onycholytic nail with subungual debris c/w onychomycosis     Umbilicated papule    Erythematous-base heme-crusted tan verrucoid plaque consistent with inflamed seborrheic keratosis     Erythematous Silvery Scaling Plaque c/w Psoriasis     See annotation      Assessment / Plan:        Seborrheic dermatitis  -     ketoconazole (NIZORAL) 2 % shampoo; Apply topically every 7 days. Shampoo scalp 1-2 times weekly. Lather x 5 minutes, then rinse well.  Dispense: 120 mL; Refill: 11  -     ketoconazole (NIZORAL) 2 % cream; Apply topically 2 (two) times daily. PRN dryness / flaking. Non-steroid.  Dispense: 60 g; Refill: 1  -     triamcinolone acetonide 0.025% (KENALOG) 0.025 % cream; Apply topically 2 (two) times daily. PRN eczema flares of face. Mild steroid.  Dispense: 15 g; Refill: 0  Start above. Discussed waxing and waning and need for maintenance vs. Flare meds.    Viral wart on finger  -     Ambulatory referral/consult to Dermatology  Cryosurgery procedure note:    After risks, benefits, and alternatives discussed, including blistering, pain, scar, recurrence, allergy to anesthesia (if given), hyper- and hypopigmentation, verbal consent from the patient is obtained.  Liquid nitrogen cryosurgery is applied to 1 verruca with prior paring, as detailed in the physical exam, to produce a freeze injury. 2 consecutive freeze thaw cycles are applied to each verruca. The patient is aware that blisters (possibly blood blisters) may form.    Pseudofolliculitis barbae  -     clindamycin (CLINDACIN ETZ) 1 % Swab; Apply topically 2 (two) times daily. PRN acne flares of posterior neck.  Dispense: 60 each; Refill: 1  Mild, discussed association w/short hair cuts and shaving. Advised to leave 1/4  inch length of hair. Recommend trial of above rx.            Follow up for seborrheic dermatitis, wart.

## 2023-11-19 ENCOUNTER — PATIENT MESSAGE (OUTPATIENT)
Dept: DERMATOLOGY | Facility: CLINIC | Age: 48
End: 2023-11-19
Payer: COMMERCIAL

## 2023-12-22 ENCOUNTER — OFFICE VISIT (OUTPATIENT)
Dept: PRIMARY CARE CLINIC | Facility: CLINIC | Age: 48
End: 2023-12-22
Payer: COMMERCIAL

## 2023-12-22 VITALS
HEIGHT: 71 IN | TEMPERATURE: 98 F | HEART RATE: 85 BPM | DIASTOLIC BLOOD PRESSURE: 70 MMHG | SYSTOLIC BLOOD PRESSURE: 120 MMHG | WEIGHT: 249.31 LBS | BODY MASS INDEX: 34.9 KG/M2 | OXYGEN SATURATION: 98 %

## 2023-12-22 DIAGNOSIS — E78.2 MIXED HYPERLIPIDEMIA: ICD-10-CM

## 2023-12-22 DIAGNOSIS — F90.9 ATTENTION DEFICIT HYPERACTIVITY DISORDER (ADHD), UNSPECIFIED ADHD TYPE: Primary | ICD-10-CM

## 2023-12-22 DIAGNOSIS — M79.604 LEG PAIN, BILATERAL: ICD-10-CM

## 2023-12-22 DIAGNOSIS — M79.10 MUSCULAR ACHES: ICD-10-CM

## 2023-12-22 DIAGNOSIS — Z12.5 ENCOUNTER FOR SCREENING FOR MALIGNANT NEOPLASM OF PROSTATE: ICD-10-CM

## 2023-12-22 DIAGNOSIS — R73.03 PREDIABETES: ICD-10-CM

## 2023-12-22 DIAGNOSIS — F52.0 DECREASED SEXUAL DESIRE: ICD-10-CM

## 2023-12-22 DIAGNOSIS — E03.9 ACQUIRED HYPOTHYROIDISM: ICD-10-CM

## 2023-12-22 DIAGNOSIS — F41.9 ANXIETY AND DEPRESSION: ICD-10-CM

## 2023-12-22 DIAGNOSIS — F32.A ANXIETY AND DEPRESSION: ICD-10-CM

## 2023-12-22 DIAGNOSIS — M79.605 LEG PAIN, BILATERAL: ICD-10-CM

## 2023-12-22 PROCEDURE — 99999 PR PBB SHADOW E&M-EST. PATIENT-LVL V: CPT | Mod: PBBFAC,,, | Performed by: FAMILY MEDICINE

## 2023-12-22 PROCEDURE — 3074F PR MOST RECENT SYSTOLIC BLOOD PRESSURE < 130 MM HG: ICD-10-PCS | Mod: CPTII,S$GLB,, | Performed by: FAMILY MEDICINE

## 2023-12-22 PROCEDURE — 3078F PR MOST RECENT DIASTOLIC BLOOD PRESSURE < 80 MM HG: ICD-10-PCS | Mod: CPTII,S$GLB,, | Performed by: FAMILY MEDICINE

## 2023-12-22 PROCEDURE — 99999 PR PBB SHADOW E&M-EST. PATIENT-LVL V: ICD-10-PCS | Mod: PBBFAC,,, | Performed by: FAMILY MEDICINE

## 2023-12-22 PROCEDURE — 3044F PR MOST RECENT HEMOGLOBIN A1C LEVEL <7.0%: ICD-10-PCS | Mod: CPTII,S$GLB,, | Performed by: FAMILY MEDICINE

## 2023-12-22 PROCEDURE — 3008F BODY MASS INDEX DOCD: CPT | Mod: CPTII,S$GLB,, | Performed by: FAMILY MEDICINE

## 2023-12-22 PROCEDURE — 3078F DIAST BP <80 MM HG: CPT | Mod: CPTII,S$GLB,, | Performed by: FAMILY MEDICINE

## 2023-12-22 PROCEDURE — 3008F PR BODY MASS INDEX (BMI) DOCUMENTED: ICD-10-PCS | Mod: CPTII,S$GLB,, | Performed by: FAMILY MEDICINE

## 2023-12-22 PROCEDURE — 1159F MED LIST DOCD IN RCRD: CPT | Mod: CPTII,S$GLB,, | Performed by: FAMILY MEDICINE

## 2023-12-22 PROCEDURE — 3074F SYST BP LT 130 MM HG: CPT | Mod: CPTII,S$GLB,, | Performed by: FAMILY MEDICINE

## 2023-12-22 PROCEDURE — 1159F PR MEDICATION LIST DOCUMENTED IN MEDICAL RECORD: ICD-10-PCS | Mod: CPTII,S$GLB,, | Performed by: FAMILY MEDICINE

## 2023-12-22 PROCEDURE — 99215 PR OFFICE/OUTPT VISIT, EST, LEVL V, 40-54 MIN: ICD-10-PCS | Mod: S$GLB,,, | Performed by: FAMILY MEDICINE

## 2023-12-22 PROCEDURE — 3044F HG A1C LEVEL LT 7.0%: CPT | Mod: CPTII,S$GLB,, | Performed by: FAMILY MEDICINE

## 2023-12-22 PROCEDURE — 99215 OFFICE O/P EST HI 40 MIN: CPT | Mod: S$GLB,,, | Performed by: FAMILY MEDICINE

## 2023-12-22 RX ORDER — DEXTROAMPHETAMINE SACCHARATE, AMPHETAMINE ASPARTATE, DEXTROAMPHETAMINE SULFATE AND AMPHETAMINE SULFATE 2.5; 2.5; 2.5; 2.5 MG/1; MG/1; MG/1; MG/1
1 TABLET ORAL 2 TIMES DAILY
Qty: 60 TABLET | Refills: 0 | Status: SHIPPED | OUTPATIENT
Start: 2024-02-20 | End: 2024-03-21

## 2023-12-22 RX ORDER — DICLOFENAC SODIUM 50 MG/1
50 TABLET, DELAYED RELEASE ORAL 3 TIMES DAILY PRN
Qty: 30 TABLET | Refills: 0 | Status: SHIPPED | OUTPATIENT
Start: 2023-12-22 | End: 2024-01-01

## 2023-12-22 RX ORDER — DEXTROAMPHETAMINE SACCHARATE, AMPHETAMINE ASPARTATE, DEXTROAMPHETAMINE SULFATE AND AMPHETAMINE SULFATE 2.5; 2.5; 2.5; 2.5 MG/1; MG/1; MG/1; MG/1
1 TABLET ORAL 2 TIMES DAILY
Qty: 60 TABLET | Refills: 0 | Status: SHIPPED | OUTPATIENT
Start: 2024-01-21 | End: 2024-02-20

## 2023-12-22 RX ORDER — CYCLOBENZAPRINE HCL 10 MG
10 TABLET ORAL NIGHTLY PRN
Qty: 30 TABLET | Refills: 0 | Status: SHIPPED | OUTPATIENT
Start: 2023-12-22

## 2023-12-22 RX ORDER — DEXTROAMPHETAMINE SACCHARATE, AMPHETAMINE ASPARTATE, DEXTROAMPHETAMINE SULFATE AND AMPHETAMINE SULFATE 2.5; 2.5; 2.5; 2.5 MG/1; MG/1; MG/1; MG/1
1 TABLET ORAL 2 TIMES DAILY
Qty: 60 TABLET | Refills: 0 | Status: SHIPPED | OUTPATIENT
Start: 2023-12-22 | End: 2024-01-21

## 2023-12-22 NOTE — PATIENT INSTRUCTIONS
Physically, everything looks really good today.      For the leg pains, try using:              OTC Tylenol - two, 500mg tablets every 8 hours              Prescription diclofenac - one tablet every 8 hours     Use prescription Flexeril (cyclobenzaprine) at bedtime to help with muscle spasms     This summer, when we do your annual wellness exam, in addition to our other screening labs, we will check some rheumatologic markers.  This will let us know if you have some sort of autoimmune or rheumatoid arthritis happening.    Refills of Adderall sent to the pharmacy.  Continue taking them, as directed.  Again, with the shortages of these types of medicines that seemed to be coming and going, I would recommend filling your prescriptions every 30 days, on schedule.  This way you have extra in case you have difficulty filling the prescription.      Tell Ms. James that we can get her in with Dr. Raza whenever she is ready.  He comes here every Tuesday.      Keep your appointment with the psychiatrist on January 30.    Continue to eat a healthy diet.  Be careful with portion sizes.  Includes lots of fresh fruits, vegetables, whole grains, lean proteins.  See info below.    Keep hydrated.  Be sure to drink at least 8-10, 8 oz, glasses of water every day.    Stay active.  Try to do some sort of physical activity every day.  Nothing outrageous, just try walking for 10-15 minutes each day.

## 2023-12-22 NOTE — PROGRESS NOTES
"    [facility]       [address] [place] [address]      Phone: [phone]      Fax: [phone]    Nito Oscar MD                Office Visit  12/22/2023        Subjective      HPI:  Migue George is a 48 y.o. male presents today in clinic for "Follow-up and Leg Pain  ."     48-year-old gentleman presents today for med refills, discuss other issues.     Overall, he feels fine today.  Denies chest pains, shortness of breath.  Denies fever, chills.  Denies coughing, sneezing, URI type symptoms.  States appetite is normal.  States bowel movements are back to normal.  No urinary issues.     States that every once in awhile he develops leg pains.  Both legs, upper thighs.  Happens mostly in the evening, or at bedtime.  Describes a discomfort, general soreness.  Almost has to curl up in a ball to make them feel better.  Wife gave him some arthritis medication which definitely helps.  Generally, wakes up okay in the mornings.  Wife thought he might be developing arthritis, suggested he get checked.    Has ADHD.  Currently takes Adderall 10 mg, twice a day.  Generally only takes when he has to work.  Dose is working well for him.  Helps with focus, concentration.  Does not affect sleep, appetite.    Still has stress, anxiety.  Has an appointment with psychiatry at the end of January.  Still takes Celexa.    Has hypothyroidism.  Currently takes Synthroid 200 mcg daily.  No issues with this medication.      Also has elevated cholesterol/triglycerides levels.  Takes Lipitor 20 mg daily, along with Vascepa.  Tolerating these fine.    PMH: Diverticulitis, prediabetes, HLD, Hashimoto's/thyroid use, ADHD, anx/dep.  PSH: Colonoscopy June/2020, repeat in 3 years.  Scrotal vein surgery in 1995.  FMH: Dad-unknown.  Mom passed away from pancreatic cancer in her 50s, hypertension, thyroid issues, alcohol abuse.  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 Stevens (med mgmt), Dr. Carolina (counseling)  GI: Dr. Burciaga    Colon:  03/17/2023.  Repeat five years (2028)            The following were updated and reviewed by myself in the chart: medications, past medical history, past surgical history, family history, social history, and allergies.     Medications:  Current Outpatient Medications on File Prior to Visit   Medication Sig Dispense Refill    atorvastatin (LIPITOR) 20 MG tablet Take 1 tablet (20 mg total) by mouth once daily. 90 tablet 3    citalopram (CELEXA) 20 MG tablet Take 1 tablet (20 mg total) by mouth once daily. 90 tablet 3    clindamycin (CLINDACIN ETZ) 1 % Swab Apply topically 2 (two) times daily. PRN acne flares of posterior neck. 60 each 1    dextroamphetamine-amphetamine (ADDERALL) 10 mg Tab Take 1 tablet (10 mg total) by mouth 2 (two) times a day. 60 tablet 0    icosapent ethyL (VASCEPA) 1 gram Cap Take 2 capsules (2 g total) by mouth 2 (two) times daily. 360 capsule 3    ketoconazole (NIZORAL) 2 % cream Apply topically 2 (two) times daily. PRN dryness / flaking. Non-steroid. 60 g 1    ketoconazole (NIZORAL) 2 % shampoo Apply topically every 7 days. Shampoo scalp 1-2 times weekly. Lather x 5 minutes, then rinse well. 120 mL 11    levothyroxine (SYNTHROID) 200 MCG tablet Take 1 tablet (200 mcg total) by mouth before breakfast. 90 tablet 3    pramoxine-hydrocortisone (PROCTOCREAM-HC) 1-1 % rectal cream Place rectally 3 (three) times daily. 3 each 2    sertraline (ZOLOFT) 100 MG tablet Take 1 tablet (100 mg total) by mouth once daily. 90 tablet 3    tadalafiL (CIALIS) 5 MG tablet Take 1 tablet (5 mg total) by mouth once daily. 30 tablet 0    tadalafiL (CIALIS) 5 MG tablet Take 1 tablet (5 mg total) by mouth daily as needed for Erectile Dysfunction. 90 tablet 3    triamcinolone acetonide 0.025% (KENALOG) 0.025 % cream Apply topically 2 (two) times daily. PRN eczema  flares of face. Mild steroid. 15 g 0    valACYclovir (VALTREX) 1000 MG tablet Take 2 tablets (2,000 mg total) by mouth 2 (two) times daily. for 2 doses 30 tablet 0     No current facility-administered medications on file prior to visit.        PMHx:  Past Medical History:   Diagnosis Date    Anxiety     Depression     Diverticulitis     Hyperlipidemia       Patient Active Problem List    Diagnosis Date Noted    Diarrhea of presumed infectious origin 2023    Hemorrhoids 2023    Diverticulosis 2023    History of colon polyps 2023    Blood in stool 2023    Hypothyroid 2015    Hyperlipidemia         PSHx:  Past Surgical History:   Procedure Laterality Date    COLONOSCOPY N/A 3/17/2023    Procedure: COLONOSCOPY;  Surgeon: Gregory Serrato MD;  Location: [facility];  Service: Endoscopy;  Laterality: N/A;    testicular vein ligation      VASECTOMY          FHx:  Family History   Problem Relation Age of Onset    Pancreatic cancer Mother     Diabetes Mother     Diabetes Unknown     Coronary artery disease Maternal Grandfather     Coronary artery disease Maternal Grandmother         Social:  Social History     Socioeconomic History    Marital status:     Number of children: 4   Occupational History    Occupation: refinery      Employer: [employer]   Tobacco Use    Smoking status: Former     Current packs/day: 0.00     Types: Cigarettes     Quit date: 2004     Years since quittin.4    Smokeless tobacco: Never   Substance and Sexual Activity    Alcohol use: Yes     Alcohol/week: 0.8 standard drinks of alcohol     Types: 1 Standard drinks or equivalent per week    Drug use: No    Sexual activity: Yes        Allergies:  Review of patient's allergies indicates:  No Known Allergies     ROS:  Review of Systems   Constitutional:  Negative for activity change, appetite change, chills and fever.   HENT:  Negative for congestion, postnasal drip,  "rhinorrhea, sore throat and trouble swallowing.    Respiratory:  Negative for cough and shortness of breath.    Cardiovascular:  Negative for chest pain and palpitations.   Gastrointestinal:  Negative for abdominal pain, constipation, diarrhea, nausea and vomiting.   Genitourinary:  Negative for difficulty urinating.   Musculoskeletal:  Positive for arthralgias and myalgias.   Skin:  Negative for color change and rash.   Neurological:  Negative for headaches.   All other systems reviewed and are negative.         Objective      /70   Pulse 85   Temp 97.9 °F (36.6 °C)   Ht 5' 11" (1.803 m)   Wt 113.1 kg (249 lb 5.4 oz)   SpO2 98%   BMI 34.78 kg/m²   Ht Readings from Last 3 Encounters:   12/22/23 5' 11" (1.803 m)   09/22/23 5' 11" (1.803 m)   07/19/23 5' 11" (1.803 m)     Wt Readings from Last 3 Encounters:   12/22/23 113.1 kg (249 lb 5.4 oz)   09/22/23 107.1 kg (236 lb 1.8 oz)   07/19/23 105.2 kg (232 lb)       PHYSICAL EXAM:  Physical Exam  Vitals and nursing note reviewed.   Constitutional:       General: He is not in acute distress.     Appearance: Normal appearance.   HENT:      Head: Normocephalic and atraumatic.      Right Ear: Tympanic membrane, ear canal and external ear normal.      Left Ear: Tympanic membrane, ear canal and external ear normal.      Nose: Nose normal. No congestion or rhinorrhea.      Mouth/Throat:      Mouth: Mucous membranes are moist.      Pharynx: Oropharynx is clear. No oropharyngeal exudate or posterior oropharyngeal erythema.   Eyes:      Extraocular Movements: Extraocular movements intact.      Conjunctiva/sclera: Conjunctivae normal.      Pupils: Pupils are equal, round, and reactive to light.   Cardiovascular:      Rate and Rhythm: Normal rate and regular rhythm.   Pulmonary:      Effort: Pulmonary effort is normal.      Breath sounds: No wheezing, rhonchi or rales.   Musculoskeletal:         General: Normal range of motion.      Cervical back: Normal range of motion. "   Lymphadenopathy:      Cervical: No cervical adenopathy.   Skin:     General: Skin is warm and dry.   Neurological:      General: No focal deficit present.      Mental Status: He is alert.              LABS / IMAGING:  Recent Results (from the past 4368 hour(s))   POCT Urinalysis, Dipstick, Automated, W/O Scope    Collection Time: 07/19/23  2:00 PM   Result Value Ref Range    POC Blood, Urine Negative Negative    POC Bilirubin, Urine Negative Negative    POC Urobilinogen, Urine normal 0.3 - 2.2    POC Ketones, Urine Negative Negative    POC Protein, Urine Negative Negative    POC Nitrates, Urine Negative Negative    POC Glucose, Urine Negative Negative    pH, UA 5.5     POC Specific Gravity, Urine 1.015 1.003 - 1.029    POC Leukocytes, Urine Negative Negative   TSH    Collection Time: 09/15/23  9:13 AM   Result Value Ref Range    TSH 2.445 0.400 - 4.000 uIU/mL   LIPID PANEL    Collection Time: 09/15/23  9:13 AM   Result Value Ref Range    Cholesterol 154 120 - 199 mg/dL    Triglycerides 173 (H) 30 - 150 mg/dL    HDL 36 (L) 40 - 75 mg/dL    LDL Cholesterol 83.4 63.0 - 159.0 mg/dL    HDL/Cholesterol Ratio 23.4 20.0 - 50.0 %    Total Cholesterol/HDL Ratio 4.3 2.0 - 5.0    Non-HDL Cholesterol 118 mg/dL         Assessment    1. Attention deficit hyperactivity disorder (ADHD), unspecified ADHD type    2. Acquired hypothyroidism    3. Mixed hyperlipidemia    4. Anxiety and depression    5. Decreased sexual desire    6. Prediabetes    7. Leg pain, bilateral    8. Muscular aches    9. Encounter for screening for malignant neoplasm of prostate          Plan    Migue was seen today for follow-up and leg pain.    Diagnoses and all orders for this visit:    Attention deficit hyperactivity disorder (ADHD), unspecified ADHD type  -     dextroamphetamine-amphetamine (ADDERALL) 10 mg Tab; Take 1 tablet (10 mg total) by mouth 2 (two) times a day.  -     dextroamphetamine-amphetamine (ADDERALL) 10 mg Tab; Take 1 tablet (10 mg total)  by mouth 2 (two) times a day.  -     dextroamphetamine-amphetamine (ADDERALL) 10 mg Tab; Take 1 tablet (10 mg total) by mouth 2 (two) times a day.    Acquired hypothyroidism  -     TSH; Future    Mixed hyperlipidemia  -     Lipid Panel; Future    Anxiety and depression  -     Lipid Panel; Future  -     TSH; Future  -     Comprehensive Metabolic Panel; Future  -     CBC Auto Differential; Future    Decreased sexual desire  -     Testosterone; Future    Prediabetes  -     Hemoglobin A1C; Future    Leg pain, bilateral  -     Sedimentation rate; Future  -     C-reactive protein; Future  -     Rheumatoid factor; Future  -     Cyclic citrul peptide antibody, IgG; Future  -     diclofenac (VOLTAREN) 50 MG EC tablet; Take 1 tablet (50 mg total) by mouth 3 (three) times daily as needed (pain).  -     cyclobenzaprine (FLEXERIL) 10 MG tablet; Take 1 tablet (10 mg total) by mouth nightly as needed for Muscle spasms.    Muscular aches  -     Sedimentation rate; Future  -     C-reactive protein; Future  -     Rheumatoid factor; Future  -     Cyclic citrul peptide antibody, IgG; Future  -     diclofenac (VOLTAREN) 50 MG EC tablet; Take 1 tablet (50 mg total) by mouth 3 (three) times daily as needed (pain).  -     cyclobenzaprine (FLEXERIL) 10 MG tablet; Take 1 tablet (10 mg total) by mouth nightly as needed for Muscle spasms.    Encounter for screening for malignant neoplasm of prostate  -     PSA, Screening; Future      Physically, everything looks pretty good today.      Suspect he is just having some muscle aches at bedtime.  Will have him try Voltaren, Flexeril, as needed.    Keep appointment with Psychiatry in January.      Refills of Adderall sent today.      Orders placed for screening blood work to be done prior to his wellness visit in June.  At his request, we will check some rheumatologic markers to rule out rheumatoid arthritis.    FOLLOW-UP:  Follow up in about 3 months (around 3/22/2024) for med refill, and in 6  months for annual exam, labs 1 week prior.    I spent a total of 45 minutes face to face and non-face to face on the date of this visit.This includes time preparing to see the patient (eg, review of tests, notes), obtaining and/or reviewing additional history from an independent historian and/or outside medical records, documenting clinical information in the electronic health record, independently interpreting results and/or communicating results to the patient/family/caregiver, or care coordinator.    Signed by:  Nito Oscar MD

## 2024-01-11 ENCOUNTER — PATIENT MESSAGE (OUTPATIENT)
Dept: PRIMARY CARE CLINIC | Facility: CLINIC | Age: 49
End: 2024-01-11
Payer: COMMERCIAL

## 2024-01-11 DIAGNOSIS — F41.9 ANXIETY AND DEPRESSION: Primary | ICD-10-CM

## 2024-01-11 DIAGNOSIS — F32.A ANXIETY AND DEPRESSION: Primary | ICD-10-CM

## 2024-01-11 DIAGNOSIS — F90.9 ATTENTION DEFICIT HYPERACTIVITY DISORDER (ADHD), UNSPECIFIED ADHD TYPE: ICD-10-CM

## 2024-03-15 DIAGNOSIS — B00.1 FEVER BLISTER: ICD-10-CM

## 2024-03-15 RX ORDER — VALACYCLOVIR HYDROCHLORIDE 1 G/1
2000 TABLET, FILM COATED ORAL 2 TIMES DAILY
Qty: 30 TABLET | Refills: 1 | Status: SHIPPED | OUTPATIENT
Start: 2024-03-15

## 2024-03-15 NOTE — TELEPHONE ENCOUNTER
Refill Authorization Note     Refill Decision Note   Migue George  is requesting a refill authorization.  Brief Assessment and Rationale for Refill:  Approve     Medication Therapy Plan:       Medication Reconciliation Completed: No   Comments:     No Care Gaps recommended.     Note composed:2:04 PM 03/15/2024

## 2024-03-15 NOTE — TELEPHONE ENCOUNTER
No care due was identified.  [facility] Embedded Care Due Messages. Reference number: [id number].   3/15/2024 11:53:29 AM CDT

## 2024-03-20 ENCOUNTER — OFFICE VISIT (OUTPATIENT)
Dept: URGENT CARE | Facility: CLINIC | Age: 49
End: 2024-03-20
Payer: COMMERCIAL

## 2024-03-20 VITALS
RESPIRATION RATE: 16 BRPM | SYSTOLIC BLOOD PRESSURE: 131 MMHG | HEIGHT: 71 IN | BODY MASS INDEX: 34.78 KG/M2 | HEART RATE: 80 BPM | DIASTOLIC BLOOD PRESSURE: 79 MMHG | TEMPERATURE: 99 F | OXYGEN SATURATION: 97 %

## 2024-03-20 DIAGNOSIS — R51.9 ACUTE NONINTRACTABLE HEADACHE, UNSPECIFIED HEADACHE TYPE: Primary | ICD-10-CM

## 2024-03-20 DIAGNOSIS — J06.9 UPPER RESPIRATORY TRACT INFECTION, UNSPECIFIED TYPE: ICD-10-CM

## 2024-03-20 LAB
CTP QC/QA: YES
POC MOLECULAR INFLUENZA A AGN: NEGATIVE
POC MOLECULAR INFLUENZA B AGN: NEGATIVE

## 2024-03-20 PROCEDURE — 87502 INFLUENZA DNA AMP PROBE: CPT | Mod: QW,S$GLB,, | Performed by: PHYSICIAN ASSISTANT

## 2024-03-20 PROCEDURE — 99213 OFFICE O/P EST LOW 20 MIN: CPT | Mod: S$GLB,,, | Performed by: PHYSICIAN ASSISTANT

## 2024-03-20 NOTE — PROGRESS NOTES
"Subjective:      Patient ID: Migue George is a 48 y.o. male.    Vitals:  height is 5' 11" (1.803 m). His oral temperature is 98.5 °F (36.9 °C). His blood pressure is 131/79 and his pulse is 80. His respiration is 16 and oxygen saturation is 97%.     Chief Complaint: Cough    Pt presents to the clinic today with a cough, nasal congestion, post nasal drip, and headache that all began at 2:00 am this morning.    Cough  This is a new problem. The current episode started today. The problem has been gradually worsening. The problem occurs every few minutes. The cough is Non-productive. Associated symptoms include headaches, nasal congestion and postnasal drip. Pertinent negatives include no chest pain, chills, ear congestion, ear pain, fever, heartburn, hemoptysis, myalgias, rash, rhinorrhea, sore throat, shortness of breath, sweats, weight loss or wheezing. Nothing aggravates the symptoms. Treatments tried: Sudafed. The treatment provided no relief. There is no history of asthma, bronchiectasis, bronchitis, COPD, emphysema, environmental allergies or pneumonia.       Constitution: Negative for chills and fever.   HENT:  Positive for postnasal drip. Negative for ear pain and sore throat.    Cardiovascular:  Negative for chest pain.   Respiratory:  Positive for cough. Negative for bloody sputum, shortness of breath and wheezing.    Gastrointestinal:  Negative for heartburn.   Musculoskeletal:  Negative for muscle ache.   Skin:  Negative for rash.   Allergic/Immunologic: Negative for environmental allergies.   Neurological:  Positive for headaches.      Objective:     Physical Exam   HENT:   Head: Normocephalic.   Ears:   Right Ear: Tympanic membrane normal.   Left Ear: Tympanic membrane normal.   Nose: Rhinorrhea and congestion present.   Mouth/Throat: No oropharyngeal exudate or posterior oropharyngeal erythema.   Cardiovascular: Normal rate and normal pulses.   Pulmonary/Chest: No respiratory distress. He has no " wheezes. He has no rales.   Abdominal: Normal appearance. He exhibits no distension. There is no abdominal tenderness.   Neurological: He is alert.   Nursing note and vitals reviewed.      Assessment:     1. Acute nonintractable headache, unspecified headache type    2. Upper respiratory tract infection, unspecified type        Here with congestion that started last night. He denies headache, fever or body aches. He deals with seasonal allergies from time to time. I will test him for the flu. Flu was negative. I recommended mucinex, claritin and rest. He was instructed to hydrate and return to the clinic if new or worsening symptoms occur.      Plan:       Acute nonintractable headache, unspecified headache type  -     POCT Influenza A/B MOLECULAR    Upper respiratory tract infection, unspecified type

## 2024-03-20 NOTE — LETTER
March 20, 2024      [facility]  [address]  [facility] [address]  Phone: [phone]       Patient: Migue George   YOB: 1975  Date of Visit: 03/20/2024    To Whom It May Concern:    Zuri George  was at [facility] on 03/20/2024. The patient may return to work/school on 3/22/24 with no restrictions. If you have any questions or concerns, or if I can be of further assistance, please do not hesitate to contact me.    Sincerely,    Dolores Levine PA-C

## 2024-03-21 ENCOUNTER — PATIENT MESSAGE (OUTPATIENT)
Dept: PRIMARY CARE CLINIC | Facility: CLINIC | Age: 49
End: 2024-03-21
Payer: COMMERCIAL

## 2024-04-04 ENCOUNTER — OFFICE VISIT (OUTPATIENT)
Dept: PRIMARY CARE CLINIC | Facility: CLINIC | Age: 49
End: 2024-04-04
Payer: COMMERCIAL

## 2024-04-04 VITALS
DIASTOLIC BLOOD PRESSURE: 88 MMHG | HEIGHT: 71 IN | BODY MASS INDEX: 34.13 KG/M2 | SYSTOLIC BLOOD PRESSURE: 136 MMHG | WEIGHT: 243.81 LBS | OXYGEN SATURATION: 98 % | TEMPERATURE: 99 F | HEART RATE: 75 BPM

## 2024-04-04 DIAGNOSIS — K62.5 RECTAL BLEEDING: ICD-10-CM

## 2024-04-04 DIAGNOSIS — N52.9 ERECTILE DYSFUNCTION, UNSPECIFIED ERECTILE DYSFUNCTION TYPE: ICD-10-CM

## 2024-04-04 DIAGNOSIS — F32.A ANXIETY AND DEPRESSION: ICD-10-CM

## 2024-04-04 DIAGNOSIS — G47.26 SHIFT WORK SLEEP DISORDER: ICD-10-CM

## 2024-04-04 DIAGNOSIS — F90.9 ATTENTION DEFICIT HYPERACTIVITY DISORDER (ADHD), UNSPECIFIED ADHD TYPE: Primary | ICD-10-CM

## 2024-04-04 DIAGNOSIS — F41.9 ANXIETY AND DEPRESSION: ICD-10-CM

## 2024-04-04 PROCEDURE — 3075F SYST BP GE 130 - 139MM HG: CPT | Mod: CPTII,S$GLB,, | Performed by: FAMILY MEDICINE

## 2024-04-04 PROCEDURE — 1159F MED LIST DOCD IN RCRD: CPT | Mod: CPTII,S$GLB,, | Performed by: FAMILY MEDICINE

## 2024-04-04 PROCEDURE — 3008F BODY MASS INDEX DOCD: CPT | Mod: CPTII,S$GLB,, | Performed by: FAMILY MEDICINE

## 2024-04-04 PROCEDURE — 3079F DIAST BP 80-89 MM HG: CPT | Mod: CPTII,S$GLB,, | Performed by: FAMILY MEDICINE

## 2024-04-04 PROCEDURE — 99215 OFFICE O/P EST HI 40 MIN: CPT | Mod: S$GLB,,, | Performed by: FAMILY MEDICINE

## 2024-04-04 PROCEDURE — 99999 PR PBB SHADOW E&M-EST. PATIENT-LVL V: CPT | Mod: PBBFAC,,, | Performed by: FAMILY MEDICINE

## 2024-04-04 PROCEDURE — G2211 COMPLEX E/M VISIT ADD ON: HCPCS | Mod: S$GLB,,, | Performed by: FAMILY MEDICINE

## 2024-04-04 RX ORDER — DEXTROAMPHETAMINE SACCHARATE, AMPHETAMINE ASPARTATE, DEXTROAMPHETAMINE SULFATE AND AMPHETAMINE SULFATE 2.5; 2.5; 2.5; 2.5 MG/1; MG/1; MG/1; MG/1
1 TABLET ORAL 2 TIMES DAILY
Qty: 60 TABLET | Refills: 0 | Status: SHIPPED | OUTPATIENT
Start: 2024-05-04 | End: 2024-06-03

## 2024-04-04 RX ORDER — DEXTROAMPHETAMINE SACCHARATE, AMPHETAMINE ASPARTATE, DEXTROAMPHETAMINE SULFATE AND AMPHETAMINE SULFATE 2.5; 2.5; 2.5; 2.5 MG/1; MG/1; MG/1; MG/1
1 TABLET ORAL 2 TIMES DAILY
Qty: 60 TABLET | Refills: 0 | Status: SHIPPED | OUTPATIENT
Start: 2024-04-04 | End: 2024-05-04

## 2024-04-04 RX ORDER — DEXTROAMPHETAMINE SACCHARATE, AMPHETAMINE ASPARTATE, DEXTROAMPHETAMINE SULFATE AND AMPHETAMINE SULFATE 2.5; 2.5; 2.5; 2.5 MG/1; MG/1; MG/1; MG/1
1 TABLET ORAL 2 TIMES DAILY
Qty: 60 TABLET | Refills: 0 | Status: SHIPPED | OUTPATIENT
Start: 2024-06-03 | End: 2024-07-03

## 2024-04-04 RX ORDER — TADALAFIL 5 MG/1
5 TABLET ORAL DAILY PRN
Qty: 90 TABLET | Refills: 3 | Status: SHIPPED | OUTPATIENT
Start: 2024-04-04 | End: 2025-04-04

## 2024-04-04 RX ORDER — TRAZODONE HYDROCHLORIDE 50 MG/1
100 TABLET ORAL NIGHTLY PRN
Qty: 180 TABLET | Refills: 3 | Status: SHIPPED | OUTPATIENT
Start: 2024-04-04 | End: 2025-04-04

## 2024-04-04 RX ORDER — TRAZODONE HYDROCHLORIDE 50 MG/1
25-100 TABLET ORAL NIGHTLY PRN
COMMUNITY
Start: 2024-03-19 | End: 2024-04-04 | Stop reason: SDUPTHER

## 2024-04-04 NOTE — PROGRESS NOTES
"    [facility]       [address] [place] [address]      Phone: [phone]      Fax: [phone]    Nito Oscar MD                Office Visit  04/04/2024        Subjective      HPI:  Migue George is a 48 y.o. male presents today in clinic for "Follow-up (Medication refill )  ."     48-year-old gentleman presents today for med refills, discuss other issues.     Overall, he feels fine today.  Denies chest pains, shortness of breath.  Denies fever, chills.  Denies coughing, sneezing, URI type symptoms.  States appetite is normal.  States bowel movements are back to normal.  No urinary issues.     Has ADHD.  Currently takes Adderall 10 mg, twice a day.  Generally only takes when he has to work.  Dose is working well for him.  Helps with focus, concentration.  Does not affect sleep, appetite.  He recently saw a psychiatric nurse practitioner at the [facility].  Saw her to discuss his anxiety meds, but she looked at his ADHD meds, as well.  Recommended that instead of taking Adderall 10 mg, twice a day, he tried taking Adderall 15 mg once a day.  He fill the prescription, but has not tried this yet.  He thinks he may just save the medication and stay with the 10 mg, twice a day.  This regimen has been working very well for him.    Still has stress, anxiety.  He was previously seeing a prescribing psychologist for medications.  They had him on both Zoloft and Celexa.  Still taking both of them.  NP at the [facility] was curious as to why he is on both.  So far, he seems to be doing okay with them but not having any side effects.  He has an appointment with [facility] scheduled in June.  He wants to stay on this regimen until he meets with a psychiatrist.    Has hypothyroidism.  Currently takes Synthroid 200 mcg daily.  No issues with this medication.      Also has elevated cholesterol/triglycerides levels.  Takes Lipitor 20 mg daily, along with " Vascepa.  Tolerating these fine.    Has been having some rectal bleeding lately.  Was happening daily, now slow down to about every other day.  Mostly bright red blood on the toilet tissue.  When he had his colonoscopy last year, he was told he had hemorrhoids.  Has tried seeing GI for it and they gave him some cream, but that does not really help.  He was having difficulty getting rescheduled with GI.  His previous gastroenterologist at [facility] no longer does General GI, just hepatology.    PMH: Diverticulitis, prediabetes, HLD, Hashimoto's/thyroid use, ADHD, anx/dep.  PSH: Colonoscopy June/2020, repeat in 3 years.  Scrotal vein surgery in 1995.  FMH: Dad-unknown.  Mom passed away from pancreatic cancer in her 50s, hypertension, thyroid issues, alcohol abuse.  Allergies: NKDA  Soc: Works at an [employer], , has a son     T: denies, quit 10+ yrs ago     A: seldom     D: denies     Psychology: Dr. Stevens (med mgmt), Dr. Carolina (counseling)  GI: Dr. Burciaga    Colon:  03/17/2023.  Repeat five years (2028)            The following were updated and reviewed by myself in the chart: medications, past medical history, past surgical history, family history, social history, and allergies.     Medications:  Current Outpatient Medications on File Prior to Visit   Medication Sig Dispense Refill    atorvastatin (LIPITOR) 20 MG tablet Take 1 tablet (20 mg total) by mouth once daily. 90 tablet 3    citalopram (CELEXA) 20 MG tablet Take 1 tablet (20 mg total) by mouth once daily. 90 tablet 3    clindamycin (CLINDACIN ETZ) 1 % Swab Apply topically 2 (two) times daily. PRN acne flares of posterior neck. 60 each 1    cyclobenzaprine (FLEXERIL) 10 MG tablet Take 1 tablet (10 mg total) by mouth nightly as needed for Muscle spasms. 30 tablet 0    dextroamphetamine-amphetamine (ADDERALL) 10 mg Tab Take 1 tablet (10 mg total) by mouth 2 (two) times a day. 60 tablet 0    icosapent ethyL (VASCEPA) 1 gram Cap Take 2 capsules (2 g  total) by mouth 2 (two) times daily. 360 capsule 3    ketoconazole (NIZORAL) 2 % cream Apply topically 2 (two) times daily. PRN dryness / flaking. Non-steroid. 60 g 1    ketoconazole (NIZORAL) 2 % shampoo Apply topically every 7 days. Shampoo scalp 1-2 times weekly. Lather x 5 minutes, then rinse well. 120 mL 11    levothyroxine (SYNTHROID) 200 MCG tablet Take 1 tablet (200 mcg total) by mouth before breakfast. 90 tablet 3    pramoxine-hydrocortisone (PROCTOCREAM-HC) 1-1 % rectal cream Place rectally 3 (three) times daily. 3 each 2    sertraline (ZOLOFT) 100 MG tablet Take 1 tablet (100 mg total) by mouth once daily. 90 tablet 3    triamcinolone acetonide 0.025% (KENALOG) 0.025 % cream Apply topically 2 (two) times daily. PRN eczema flares of face. Mild steroid. 15 g 0    valACYclovir (VALTREX) 1000 MG tablet Take 2 tablets by mouth twice daily 30 tablet 1    [DISCONTINUED] tadalafiL (CIALIS) 5 MG tablet Take 1 tablet (5 mg total) by mouth once daily. 30 tablet 0    [DISCONTINUED] tadalafiL (CIALIS) 5 MG tablet Take 1 tablet (5 mg total) by mouth daily as needed for Erectile Dysfunction. 90 tablet 3    [DISCONTINUED] traZODone (DESYREL) 50 MG tablet Take  mg by mouth nightly as needed.       No current facility-administered medications on file prior to visit.        PMHx:  Past Medical History:   Diagnosis Date    Anxiety     Depression     Diverticulitis     Hyperlipidemia       Patient Active Problem List    Diagnosis Date Noted    Diarrhea of presumed infectious origin 01/18/2023    Hemorrhoids 01/18/2023    Diverticulosis 01/18/2023    History of colon polyps 01/18/2023    Blood in stool 01/18/2023    Hypothyroid 03/27/2015    Hyperlipidemia         PSHx:  Past Surgical History:   Procedure Laterality Date    COLONOSCOPY N/A 3/17/2023    Procedure: COLONOSCOPY;  Surgeon: Gregory Serrato MD;  [facility]: [facility];  Service: Endoscopy;  Laterality: N/A;    testicular vein ligation      VASECTOMY       "    FHx:  Family History   Problem Relation Age of Onset    Pancreatic cancer Mother     Diabetes Mother     Diabetes Unknown     Coronary artery disease Maternal Grandfather     Coronary artery disease Maternal Grandmother         Social:  Social History     Socioeconomic History    Marital status:     Number of children: 4   Occupational History    Occupation: [employer]      Employer: [employer]   Tobacco Use    Smoking status: Former     Current packs/day: 0.00     Types: Cigarettes     Quit date: 2004     Years since quittin.6    Smokeless tobacco: Never   Substance and Sexual Activity    Alcohol use: Yes     Alcohol/week: 0.8 standard drinks of alcohol     Types: 1 Standard drinks or equivalent per week    Drug use: No    Sexual activity: Yes        Allergies:  Review of patient's allergies indicates:  No Known Allergies     ROS:  Review of Systems   Constitutional:  Negative for activity change, appetite change, chills and fever.   HENT:  Negative for congestion, postnasal drip, rhinorrhea, sore throat and trouble swallowing.    Respiratory:  Negative for cough and shortness of breath.    Cardiovascular:  Negative for chest pain and palpitations.   Gastrointestinal:  Positive for blood in stool. Negative for abdominal pain, constipation, diarrhea, nausea and vomiting.   Genitourinary:  Negative for difficulty urinating.   Musculoskeletal:  Negative for arthralgias and myalgias.   Skin:  Negative for color change and rash.   Neurological:  Negative for headaches.   All other systems reviewed and are negative.         Objective      /88   Pulse 75   Temp 98.7 °F (37.1 °C)   Ht 5' 11" (1.803 m)   Wt 110.6 kg (243 lb 13.3 oz)   SpO2 98%   BMI 34.01 kg/m²   Ht Readings from Last 3 Encounters:   24 5' 11" (1.803 m)   24 5' 11" (1.803 m)   23 5' 11" (1.803 m)     Wt Readings from Last 3 Encounters:   24 110.6 kg (243 lb 13.3 oz)   23 " 113.1 kg (249 lb 5.4 oz)   09/22/23 107.1 kg (236 lb 1.8 oz)       PHYSICAL EXAM:  Physical Exam  Vitals and nursing note reviewed.   Constitutional:       General: He is not in acute distress.     Appearance: Normal appearance.   HENT:      Head: Normocephalic and atraumatic.      Right Ear: Tympanic membrane, ear canal and external ear normal.      Left Ear: Tympanic membrane, ear canal and external ear normal.      Nose: Nose normal. No congestion or rhinorrhea.      Mouth/Throat:      Mouth: Mucous membranes are moist.      Pharynx: Oropharynx is clear. No oropharyngeal exudate or posterior oropharyngeal erythema.   Eyes:      Extraocular Movements: Extraocular movements intact.      Conjunctiva/sclera: Conjunctivae normal.      Pupils: Pupils are equal, round, and reactive to light.   Cardiovascular:      Rate and Rhythm: Normal rate and regular rhythm.   Pulmonary:      Effort: Pulmonary effort is normal.      Breath sounds: No wheezing, rhonchi or rales.   Musculoskeletal:         General: Normal range of motion.      Cervical back: Normal range of motion.   Lymphadenopathy:      Cervical: No cervical adenopathy.   Skin:     General: Skin is warm and dry.   Neurological:      General: No focal deficit present.      Mental Status: He is alert.              LABS / IMAGING:  Recent Results (from the past 4368 hour(s))   POCT Influenza A/B MOLECULAR    Collection Time: 03/20/24  3:21 PM   Result Value Ref Range    POC Molecular Influenza A Ag Negative Negative, Not Reported    POC Molecular Influenza B Ag Negative Negative, Not Reported     Acceptable Yes          Assessment    1. Attention deficit hyperactivity disorder (ADHD), unspecified ADHD type    2. Anxiety and depression    3. Shift work sleep disorder    4. Rectal bleeding    5. Erectile dysfunction, unspecified erectile dysfunction type          Dimple Camarena was seen today for follow-up.    Diagnoses and all orders for this  visit:    Attention deficit hyperactivity disorder (ADHD), unspecified ADHD type  -     dextroamphetamine-amphetamine (ADDERALL) 10 mg Tab; Take 1 tablet (10 mg total) by mouth 2 (two) times a day.  -     dextroamphetamine-amphetamine (ADDERALL) 10 mg Tab; Take 1 tablet (10 mg total) by mouth 2 (two) times a day.  -     dextroamphetamine-amphetamine (ADDERALL) 10 mg Tab; Take 1 tablet (10 mg total) by mouth 2 (two) times a day.    Anxiety and depression    Shift work sleep disorder  -     traZODone (DESYREL) 50 MG tablet; Take 2 tablets (100 mg total) by mouth nightly as needed for Insomnia.    Rectal bleeding  -     Ambulatory referral/consult to Colorectal Surgery; Future    Erectile dysfunction, unspecified erectile dysfunction type  -     tadalafiL (CIALIS) 5 MG tablet; Take 1 tablet (5 mg total) by mouth daily as needed for Erectile Dysfunction.      Physically, everything looks pretty good today.      Refill of Adderall 10 mg, b.i.d., sent to the pharmacy.  He is comfortable on this regimen, so we will keep it going as is.      He works shift work, and often has trouble falling asleep.  Routine is generally 2-3 shifts of days, then a couple of days off, then 2-3 shifts of nights.  As a result, his sleep schedule is challenging at best.  Has a prescription for trazodone.  Has not really tried using it yet.    We will keep him on the Celexa/Zoloft combo for now.  Seems to be doing well.  We will let him see Psychiatry in June an take their recommendations.    Rather than going back to see GI, we will place a referral to (and schedule him for) Colorectal.  He should be able to have an easier time scheduling with them, plus they would be the wants to do any possible interventions.    FOLLOW-UP:  Follow up in about 3 months (around 7/4/2024) for med refill.    I spent a total of 45 minutes face to face and non-face to face on the date of this visit.This includes time preparing to see the patient (eg, review of  tests, notes), obtaining and/or reviewing additional history from an independent historian and/or outside medical records, documenting clinical information in the electronic health record, independently interpreting results and/or communicating results to the patient/family/caregiver, or care coordinator.  Visit today included increased complexity associated with the care of the episodic problem addressed and managing the longitudinal care of the patient due to the serious and/or complex managed problem(s).    Signed by:  Nito Oscar MD

## 2024-04-04 NOTE — PATIENT INSTRUCTIONS
Overall, everything looks pretty good today.      For the rectal bleeding, let us have you see a colorectal specialist, rather than a gastroenterologist.  I was able to get you scheduled next Tuesday at 3:00 p.m..  This appointment will be in [place], at our [place] location.    For the ADHD, let us stay with the 10 mg, twice a day, dosing.  Refills sent to the pharmacy.      Keep your appointment with the psychiatrist in June.  Our goal with him is to look at the Celexa/Zoloft combination and offer suggestions on whether to stay on this regimen or make a change.    I would also like you to be back in counseling/therapy.  I recommend contacting Anel Harvey LPC.  Feel free to call her at [phone].  Her office is located at [address] [place] [address], [address], here in [place].    [email or web address]    Alternatively, you can try contacting [facility] at [phone].  They have several counselors on staff that can help.  [email or web address]    If you can not get scheduled with either of these people, please let me know.  I can always suggest other options.    Continue to eat a healthy diet.  Be careful with portion sizes.  Includes lots of fresh fruits, vegetables, whole grains, lean proteins.  See info below.    Keep hydrated.  Be sure to drink at least 8-10, 8 oz, glasses of water every day.    Stay active.  Try to do some sort of physical activity every day.  Nothing outrageous, just try walking for 10-15 minutes each day.

## 2024-04-05 ENCOUNTER — PATIENT MESSAGE (OUTPATIENT)
Dept: PRIMARY CARE CLINIC | Facility: CLINIC | Age: 49
End: 2024-04-05
Payer: COMMERCIAL

## 2024-04-08 ENCOUNTER — PATIENT MESSAGE (OUTPATIENT)
Dept: SURGERY | Facility: CLINIC | Age: 49
End: 2024-04-08
Payer: COMMERCIAL

## 2024-04-09 ENCOUNTER — OFFICE VISIT (OUTPATIENT)
Dept: SURGERY | Facility: CLINIC | Age: 49
End: 2024-04-09
Payer: COMMERCIAL

## 2024-04-09 VITALS
TEMPERATURE: 99 F | OXYGEN SATURATION: 93 % | WEIGHT: 242.75 LBS | DIASTOLIC BLOOD PRESSURE: 88 MMHG | SYSTOLIC BLOOD PRESSURE: 145 MMHG | HEIGHT: 71 IN | BODY MASS INDEX: 33.98 KG/M2 | HEART RATE: 81 BPM

## 2024-04-09 DIAGNOSIS — K62.5 RECTAL BLEEDING: ICD-10-CM

## 2024-04-09 DIAGNOSIS — K64.8 INTERNAL HEMORRHOIDS: Primary | ICD-10-CM

## 2024-04-09 PROCEDURE — 99999 PR PBB SHADOW E&M-EST. PATIENT-LVL V: CPT | Mod: PBBFAC,,,

## 2024-04-09 PROCEDURE — 1159F MED LIST DOCD IN RCRD: CPT | Mod: CPTII,S$GLB,,

## 2024-04-09 PROCEDURE — 3008F BODY MASS INDEX DOCD: CPT | Mod: CPTII,S$GLB,,

## 2024-04-09 PROCEDURE — 99204 OFFICE O/P NEW MOD 45 MIN: CPT | Mod: S$GLB,,,

## 2024-04-09 PROCEDURE — 3079F DIAST BP 80-89 MM HG: CPT | Mod: CPTII,S$GLB,,

## 2024-04-09 PROCEDURE — 3077F SYST BP >= 140 MM HG: CPT | Mod: CPTII,S$GLB,,

## 2024-04-11 NOTE — PROGRESS NOTES
History & Physical    Subjective     CC: rectal bleeding and discomfort  Ref: Nito Oscar MD    History of Present Illness:  Patient is a 48 y.o. male presents with rectal bleeding and discomfort.  Onset of symptoms 1-2 years ago.  Patient reports rectal bleeding only with bowel movements.  Reports anal discomfort that is worse after bowel movements where he feels an external lesion that occasionally burns in his tender to palpation.  Feels like he has intermittent flares of external hemorrhoids that cause tenderness.  Patient reports daily bowel movement, takes a fiber capsule daily, does not take any stool softeners or laxatives, does have some hard stools, rarely has to strain, sits on the toilet for more than 5 minutes at a time, drinks more than 64 oz of water daily.  Patient had a colonoscopy 03/2023 with polyps, hemorrhoids, diverticulosis.  Family history includes cousin with Crohn's disease; denies family history of polyps or colorectal cancer.  No anticoagulation.  Patient states that he has used over-the-counter and prescribed steroid creams in the past which have not helped with his symptoms. Denies nausea, vomiting, fevers, chills, abdominal pain, unintentional weight loss.       Chief Complaint   Patient presents with    Rectal Bleeding     Rectal bleeding       Review of patient's allergies indicates:  No Known Allergies    Current Outpatient Medications   Medication Sig Dispense Refill    atorvastatin (LIPITOR) 20 MG tablet Take 1 tablet (20 mg total) by mouth once daily. 90 tablet 3    citalopram (CELEXA) 20 MG tablet Take 1 tablet (20 mg total) by mouth once daily. 90 tablet 3    clindamycin (CLINDACIN ETZ) 1 % Swab Apply topically 2 (two) times daily. PRN acne flares of posterior neck. 60 each 1    cyclobenzaprine (FLEXERIL) 10 MG tablet Take 1 tablet (10 mg total) by mouth nightly as needed for Muscle spasms. 30 tablet 0    dextroamphetamine-amphetamine (ADDERALL) 10 mg Tab Take 1 tablet (10  mg total) by mouth 2 (two) times a day. 60 tablet 0    [START ON 5/4/2024] dextroamphetamine-amphetamine (ADDERALL) 10 mg Tab Take 1 tablet (10 mg total) by mouth 2 (two) times a day. 60 tablet 0    [START ON 6/3/2024] dextroamphetamine-amphetamine (ADDERALL) 10 mg Tab Take 1 tablet (10 mg total) by mouth 2 (two) times a day. 60 tablet 0    icosapent ethyL (VASCEPA) 1 gram Cap Take 2 capsules (2 g total) by mouth 2 (two) times daily. 360 capsule 3    ketoconazole (NIZORAL) 2 % cream Apply topically 2 (two) times daily. PRN dryness / flaking. Non-steroid. 60 g 1    ketoconazole (NIZORAL) 2 % shampoo Apply topically every 7 days. Shampoo scalp 1-2 times weekly. Lather x 5 minutes, then rinse well. 120 mL 11    levothyroxine (SYNTHROID) 200 MCG tablet Take 1 tablet (200 mcg total) by mouth before breakfast. 90 tablet 3    pramoxine-hydrocortisone (PROCTOCREAM-HC) 1-1 % rectal cream Place rectally 3 (three) times daily. 3 each 2    sertraline (ZOLOFT) 100 MG tablet Take 1 tablet (100 mg total) by mouth once daily. 90 tablet 3    tadalafiL (CIALIS) 5 MG tablet Take 1 tablet (5 mg total) by mouth daily as needed for Erectile Dysfunction. 90 tablet 3    traZODone (DESYREL) 50 MG tablet Take 2 tablets (100 mg total) by mouth nightly as needed for Insomnia. 180 tablet 3    triamcinolone acetonide 0.025% (KENALOG) 0.025 % cream Apply topically 2 (two) times daily. PRN eczema flares of face. Mild steroid. 15 g 0    valACYclovir (VALTREX) 1000 MG tablet Take 2 tablets by mouth twice daily 30 tablet 1     No current facility-administered medications for this visit.       Past Medical History:   Diagnosis Date    Anxiety     Depression     Diverticulitis     Hyperlipidemia      Past Surgical History:   Procedure Laterality Date    COLONOSCOPY N/A 3/17/2023    Procedure: COLONOSCOPY;  Surgeon: Gregory Serrato MD;  [facility]: [facility];  Service: Endoscopy;  Laterality: N/A;    testicular vein ligation      VASECTOMY    "    Family History   Problem Relation Age of Onset    Pancreatic cancer Mother     Diabetes Mother     Diabetes Unknown     Coronary artery disease Maternal Grandfather     Coronary artery disease Maternal Grandmother      Social History     Tobacco Use    Smoking status: Former     Current packs/day: 0.00     Types: Cigarettes     Quit date: 2004     Years since quittin.7    Smokeless tobacco: Never   Substance Use Topics    Alcohol use: Yes     Alcohol/week: 0.8 standard drinks of alcohol     Types: 1 Standard drinks or equivalent per week    Drug use: No        Review of Systems:  Review of Systems   Constitutional:  Negative for activity change, appetite change, chills, fatigue, fever and unexpected weight change.   HENT:  Negative for congestion, ear pain, sore throat and trouble swallowing.    Eyes:  Negative for pain, redness and itching.   Respiratory:  Negative for cough, shortness of breath and wheezing.    Cardiovascular:  Negative for chest pain, palpitations and leg swelling.   Gastrointestinal:  Positive for anal bleeding and rectal pain. Negative for abdominal distention, abdominal pain, blood in stool, constipation, diarrhea, nausea and vomiting.   Endocrine: Negative for cold intolerance, heat intolerance and polyuria.   Genitourinary:  Negative for dysuria, flank pain, frequency and hematuria.   Musculoskeletal:  Negative for gait problem, joint swelling and neck pain.   Skin:  Negative for color change, rash and wound.   Allergic/Immunologic: Negative for environmental allergies and immunocompromised state.   Neurological:  Negative for dizziness, speech difficulty, weakness and numbness.   Psychiatric/Behavioral:  Negative for agitation, confusion and hallucinations.           Objective     Vital Signs (Most Recent)  Temp: 98.5 °F (36.9 °C) (24 1506)  Pulse: 81 (24 1506)  BP: (!) 145/88 (24 1506)  SpO2: (!) 93 % (24 1506)  5' 11" (1.803 m)  110.1 kg (242 lb 11.6 " oz)     Physical Exam:  Physical Exam  Vitals and nursing note reviewed. Exam conducted with a chaperone present.   Constitutional:       General: He is not in acute distress.     Appearance: Normal appearance. He is well-developed. He is not ill-appearing or toxic-appearing.   HENT:      Head: Normocephalic and atraumatic.      Right Ear: External ear normal.      Left Ear: External ear normal.      Nose: Nose normal.   Cardiovascular:      Rate and Rhythm: Normal rate.   Pulmonary:      Effort: Pulmonary effort is normal. No respiratory distress.   Abdominal:      General: Abdomen is flat. There is no distension.      Palpations: Abdomen is soft.      Tenderness: There is no abdominal tenderness.   Genitourinary:     Comments: Anorectal: +small right anterior external hemorrhoid - soft/NT/NT. No other external masses or lesions. No TTP. No fissure. LAW with good tone, no blood, and no palpable masses or lesions.   Musculoskeletal:         General: Normal range of motion.      Cervical back: Normal range of motion and neck supple.   Skin:     General: Skin is warm and dry.   Neurological:      Mental Status: He is alert and oriented to person, place, and time.   Psychiatric:         Mood and Affect: Mood normal.         Behavior: Behavior normal.       Anoscopy Procedure Note    Pre-procedure diagnosis: Rectal bleeding    Post-procedure diagnosis: Internal hemorrhoids    Procedure: Anoscopy    Surgeon: John Tang PA-C    Assistant: Bonnie Zhang RN     Specimen: none    Findings: Anoscope inserted and all 4 quadrants examined. Moderately enlarged left lateral/right anterior/right posterior internal hemorrhoids without evidence of recent bleeding. No other internal masses or lesions visualized.     Patient tolerated procedure well.      Diagnostic Results:  Colonoscopy 03/2023: reviewed        Assessment and Plan     48 y.o. male with rectal bleeding + discomfort with internal and external hemorrhoids      -Discussed causes and treatment of hemorrhoidal disease including improvement in bowel habits, banding, and excisional hemorrhoidectomy. Patient elected to proceed with improvement of bowel habits. I believe that since patient's main complaint is bleeding, most of his symptoms are coming from internal hemorrhoids that would possibly benefit from banding. Patient is interested in foregoing banding and having a hemorrhoidectomy to remove both internal and external hemorrhoids since he has occasional discomfort and pain and swelling. Will work on improvement of bowel habits & follow up with MD for banding vs surgery if no improvement of symptoms despite conservative therapy.  Both hemorrhoid banding and hemorrhoidectomy were discussed in detail with patient including risks, benefits, recovery, procedure.   -Discussed that a minimum I would recommend behavioral, lifestyle and medication modifications to improve bowel habits. Usual bowel management handout given to patient. This includes a stool softener twice per day, fiber powder supplementation daily, drinking at least 64oz of water/day, avoiding straining with bowel movements, spending less than 5 min on toilet per bowel movement, eating a high fiber diet, using miralax as needed to achieve a bowel movement daily and using wet wipes to wipe after bowel movements when irritated.   -RTC 4-6 weeks if no improvement of symptoms for possible banding vs scheduling hemorrhoidectomy     John Tang PA-C  Colon and Rectal Surgery  [facility]

## 2024-04-29 ENCOUNTER — LAB VISIT (OUTPATIENT)
Dept: LAB | Facility: HOSPITAL | Age: 49
End: 2024-04-29
Attending: COLON & RECTAL SURGERY
Payer: COMMERCIAL

## 2024-04-29 ENCOUNTER — OFFICE VISIT (OUTPATIENT)
Dept: SURGERY | Facility: CLINIC | Age: 49
End: 2024-04-29
Payer: COMMERCIAL

## 2024-04-29 VITALS
OXYGEN SATURATION: 95 % | SYSTOLIC BLOOD PRESSURE: 119 MMHG | WEIGHT: 242.5 LBS | BODY MASS INDEX: 33.95 KG/M2 | TEMPERATURE: 98 F | HEART RATE: 70 BPM | DIASTOLIC BLOOD PRESSURE: 76 MMHG | HEIGHT: 71 IN

## 2024-04-29 DIAGNOSIS — K64.8 INTERNAL HEMORRHOIDS: Primary | ICD-10-CM

## 2024-04-29 DIAGNOSIS — K64.4 EXTERNAL HEMORRHOID: ICD-10-CM

## 2024-04-29 DIAGNOSIS — K64.8 INTERNAL HEMORRHOID: ICD-10-CM

## 2024-04-29 DIAGNOSIS — K64.8 INTERNAL HEMORRHOIDS: ICD-10-CM

## 2024-04-29 LAB
ALBUMIN SERPL BCP-MCNC: 4.2 G/DL (ref 3.5–5.2)
ALP SERPL-CCNC: 55 U/L (ref 55–135)
ALT SERPL W/O P-5'-P-CCNC: 30 U/L (ref 10–44)
ANION GAP SERPL CALC-SCNC: 8 MMOL/L (ref 8–16)
AST SERPL-CCNC: 20 U/L (ref 10–40)
BASOPHILS # BLD AUTO: 0.03 K/UL (ref 0–0.2)
BASOPHILS NFR BLD: 0.6 % (ref 0–1.9)
BILIRUB SERPL-MCNC: 0.5 MG/DL (ref 0.1–1)
BUN SERPL-MCNC: 11 MG/DL (ref 6–20)
CALCIUM SERPL-MCNC: 9.3 MG/DL (ref 8.7–10.5)
CHLORIDE SERPL-SCNC: 107 MMOL/L (ref 95–110)
CO2 SERPL-SCNC: 23 MMOL/L (ref 23–29)
CREAT SERPL-MCNC: 0.9 MG/DL (ref 0.5–1.4)
DIFFERENTIAL METHOD BLD: ABNORMAL
EOSINOPHIL # BLD AUTO: 0.3 K/UL (ref 0–0.5)
EOSINOPHIL NFR BLD: 5.7 % (ref 0–8)
ERYTHROCYTE [DISTWIDTH] IN BLOOD BY AUTOMATED COUNT: 14.3 % (ref 11.5–14.5)
EST. GFR  (NO RACE VARIABLE): >60 ML/MIN/1.73 M^2
GLUCOSE SERPL-MCNC: 94 MG/DL (ref 70–110)
HCT VFR BLD AUTO: 42.4 % (ref 40–54)
HGB BLD-MCNC: 14.4 G/DL (ref 14–18)
IMM GRANULOCYTES # BLD AUTO: 0.02 K/UL (ref 0–0.04)
IMM GRANULOCYTES NFR BLD AUTO: 0.4 % (ref 0–0.5)
LYMPHOCYTES # BLD AUTO: 1.2 K/UL (ref 1–4.8)
LYMPHOCYTES NFR BLD: 21.9 % (ref 18–48)
MCH RBC QN AUTO: 27.2 PG (ref 27–31)
MCHC RBC AUTO-ENTMCNC: 34 G/DL (ref 32–36)
MCV RBC AUTO: 80 FL (ref 82–98)
MONOCYTES # BLD AUTO: 0.7 K/UL (ref 0.3–1)
MONOCYTES NFR BLD: 12.6 % (ref 4–15)
NEUTROPHILS # BLD AUTO: 3.1 K/UL (ref 1.8–7.7)
NEUTROPHILS NFR BLD: 58.8 % (ref 38–73)
NRBC BLD-RTO: 0 /100 WBC
PLATELET # BLD AUTO: 177 K/UL (ref 150–450)
PMV BLD AUTO: 11.3 FL (ref 9.2–12.9)
POTASSIUM SERPL-SCNC: 4.1 MMOL/L (ref 3.5–5.1)
PROT SERPL-MCNC: 6.8 G/DL (ref 6–8.4)
RBC # BLD AUTO: 5.29 M/UL (ref 4.6–6.2)
SODIUM SERPL-SCNC: 138 MMOL/L (ref 136–145)
WBC # BLD AUTO: 5.24 K/UL (ref 3.9–12.7)

## 2024-04-29 PROCEDURE — 85025 COMPLETE CBC W/AUTO DIFF WBC: CPT | Performed by: COLON & RECTAL SURGERY

## 2024-04-29 PROCEDURE — 99214 OFFICE O/P EST MOD 30 MIN: CPT | Mod: 25,S$GLB,, | Performed by: COLON & RECTAL SURGERY

## 2024-04-29 PROCEDURE — 36415 COLL VENOUS BLD VENIPUNCTURE: CPT | Performed by: COLON & RECTAL SURGERY

## 2024-04-29 PROCEDURE — 1159F MED LIST DOCD IN RCRD: CPT | Mod: CPTII,S$GLB,, | Performed by: COLON & RECTAL SURGERY

## 2024-04-29 PROCEDURE — 99999 PR PBB SHADOW E&M-EST. PATIENT-LVL V: CPT | Mod: PBBFAC,,, | Performed by: COLON & RECTAL SURGERY

## 2024-04-29 PROCEDURE — 3074F SYST BP LT 130 MM HG: CPT | Mod: CPTII,S$GLB,, | Performed by: COLON & RECTAL SURGERY

## 2024-04-29 PROCEDURE — 46600 DIAGNOSTIC ANOSCOPY SPX: CPT | Mod: S$GLB,,, | Performed by: COLON & RECTAL SURGERY

## 2024-04-29 PROCEDURE — 3008F BODY MASS INDEX DOCD: CPT | Mod: CPTII,S$GLB,, | Performed by: COLON & RECTAL SURGERY

## 2024-04-29 PROCEDURE — 80053 COMPREHEN METABOLIC PANEL: CPT | Performed by: COLON & RECTAL SURGERY

## 2024-04-29 PROCEDURE — 3078F DIAST BP <80 MM HG: CPT | Mod: CPTII,S$GLB,, | Performed by: COLON & RECTAL SURGERY

## 2024-04-29 RX ORDER — ONDANSETRON HYDROCHLORIDE 2 MG/ML
4 INJECTION, SOLUTION INTRAVENOUS EVERY 12 HOURS PRN
Status: CANCELLED | OUTPATIENT
Start: 2024-04-29

## 2024-04-29 RX ORDER — SODIUM CHLORIDE, SODIUM LACTATE, POTASSIUM CHLORIDE, CALCIUM CHLORIDE 600; 310; 30; 20 MG/100ML; MG/100ML; MG/100ML; MG/100ML
INJECTION, SOLUTION INTRAVENOUS CONTINUOUS
Status: CANCELLED | OUTPATIENT
Start: 2024-04-29

## 2024-04-29 NOTE — PROGRESS NOTES
History & Physical    Subjective     CC: rectal bleeding and discomfort  Ref: Nito Oscar MD    History of Present Illness:  Patient is a 48 y.o. male presents with rectal bleeding and discomfort.  Onset of symptoms 1-2 years ago.  Patient reports rectal bleeding only with bowel movements.  Reports anal discomfort that is worse after bowel movements where he feels an external lesion that occasionally burns in his tender to palpation.  Feels like he has intermittent flares of external hemorrhoids that cause tenderness.  Patient reports daily bowel movement, takes a fiber capsule daily, does not take any stool softeners or laxatives, does have some hard stools, rarely has to strain, sits on the toilet for more than 5 minutes at a time, drinks more than 64 oz of water daily.  Patient had a colonoscopy 03/2023 with polyps, hemorrhoids, diverticulosis.  Family history includes cousin with Crohn's disease; denies family history of polyps or colorectal cancer.  No anticoagulation.  Patient states that he has used over-the-counter and prescribed steroid creams in the past which have not helped with his symptoms. Denies nausea, vomiting, fevers, chills, abdominal pain, unintentional weight loss.     Interval History:  Since the last clinic visit, the patient has started metamucil powder daily, drinking more water, sitting on toilet for less time, avoiding straining. He has had an improvement of symptoms; has had decreased frequency of rectal discomfort/swelling/pain. Still with some rectal bleeding with bowel movements. Interested in hemorrhoidectomy.     Review of patient's allergies indicates:  No Known Allergies    Current Outpatient Medications   Medication Sig Dispense Refill    atorvastatin (LIPITOR) 20 MG tablet Take 1 tablet (20 mg total) by mouth once daily. 90 tablet 3    citalopram (CELEXA) 20 MG tablet Take 1 tablet (20 mg total) by mouth once daily. 90 tablet 3    clindamycin (CLINDACIN ETZ) 1 % Swab Apply  topically 2 (two) times daily. PRN acne flares of posterior neck. 60 each 1    cyclobenzaprine (FLEXERIL) 10 MG tablet Take 1 tablet (10 mg total) by mouth nightly as needed for Muscle spasms. 30 tablet 0    dextroamphetamine-amphetamine (ADDERALL) 10 mg Tab Take 1 tablet (10 mg total) by mouth 2 (two) times a day. 60 tablet 0    [START ON 5/4/2024] dextroamphetamine-amphetamine (ADDERALL) 10 mg Tab Take 1 tablet (10 mg total) by mouth 2 (two) times a day. 60 tablet 0    [START ON 6/3/2024] dextroamphetamine-amphetamine (ADDERALL) 10 mg Tab Take 1 tablet (10 mg total) by mouth 2 (two) times a day. 60 tablet 0    icosapent ethyL (VASCEPA) 1 gram Cap Take 2 capsules (2 g total) by mouth 2 (two) times daily. 360 capsule 3    ketoconazole (NIZORAL) 2 % cream Apply topically 2 (two) times daily. PRN dryness / flaking. Non-steroid. 60 g 1    ketoconazole (NIZORAL) 2 % shampoo Apply topically every 7 days. Shampoo scalp 1-2 times weekly. Lather x 5 minutes, then rinse well. 120 mL 11    levothyroxine (SYNTHROID) 200 MCG tablet Take 1 tablet (200 mcg total) by mouth before breakfast. 90 tablet 3    pramoxine-hydrocortisone (PROCTOCREAM-HC) 1-1 % rectal cream Place rectally 3 (three) times daily. 3 each 2    sertraline (ZOLOFT) 100 MG tablet Take 1 tablet (100 mg total) by mouth once daily. 90 tablet 3    tadalafiL (CIALIS) 5 MG tablet Take 1 tablet (5 mg total) by mouth daily as needed for Erectile Dysfunction. 90 tablet 3    traZODone (DESYREL) 50 MG tablet Take 2 tablets (100 mg total) by mouth nightly as needed for Insomnia. 180 tablet 3    triamcinolone acetonide 0.025% (KENALOG) 0.025 % cream Apply topically 2 (two) times daily. PRN eczema flares of face. Mild steroid. 15 g 0    valACYclovir (VALTREX) 1000 MG tablet Take 2 tablets by mouth twice daily 30 tablet 1     No current facility-administered medications for this visit.       Past Medical History:   Diagnosis Date    Anxiety     Depression     Diverticulitis      Hyperlipidemia      Past Surgical History:   Procedure Laterality Date    COLONOSCOPY N/A 3/17/2023    Procedure: COLONOSCOPY;  Surgeon: Gregory Serrato MD;  Location: [facility];  Service: Endoscopy;  Laterality: N/A;    testicular vein ligation      VASECTOMY       Family History   Problem Relation Name Age of Onset    Pancreatic cancer Mother      Diabetes Mother      Diabetes Unknown      Coronary artery disease Maternal Grandfather      Coronary artery disease Maternal Grandmother       Social History     Tobacco Use    Smoking status: Former     Current packs/day: 0.00     Types: Cigarettes     Quit date: 2004     Years since quittin.7    Smokeless tobacco: Never   Substance Use Topics    Alcohol use: Yes     Alcohol/week: 0.8 standard drinks of alcohol     Types: 1 Standard drinks or equivalent per week    Drug use: No        Review of Systems:  Review of Systems   Constitutional:  Negative for activity change, appetite change, chills, fatigue, fever and unexpected weight change.   HENT:  Negative for congestion, ear pain, sore throat and trouble swallowing.    Eyes:  Negative for pain, redness and itching.   Respiratory:  Negative for cough, shortness of breath and wheezing.    Cardiovascular:  Negative for chest pain, palpitations and leg swelling.   Gastrointestinal:  Positive for anal bleeding and rectal pain. Negative for abdominal distention, abdominal pain, blood in stool, constipation, diarrhea, nausea and vomiting.   Endocrine: Negative for cold intolerance, heat intolerance and polyuria.   Genitourinary:  Negative for dysuria, flank pain, frequency and hematuria.   Musculoskeletal:  Negative for gait problem, joint swelling and neck pain.   Skin:  Negative for color change, rash and wound.   Allergic/Immunologic: Negative for environmental allergies and immunocompromised state.   Neurological:  Negative for dizziness, speech difficulty, weakness and numbness.   Psychiatric/Behavioral:  " Negative for agitation, confusion and hallucinations.       Objective     Vital Signs (Most Recent)  Temp: 98.4 °F (36.9 °C) (04/29/24 0758)  Pulse: 70 (04/29/24 0758)  BP: 119/76 (04/29/24 0758)  SpO2: 95 % (04/29/24 0758)  5' 11" (1.803 m)  110 kg (242 lb 8.1 oz)     Physical Exam:  Physical Exam  Vitals and nursing note reviewed. Exam conducted with a chaperone present.   Constitutional:       General: He is not in acute distress.     Appearance: Normal appearance. He is well-developed. He is not ill-appearing or toxic-appearing.   HENT:      Head: Normocephalic and atraumatic.      Right Ear: External ear normal.      Left Ear: External ear normal.      Nose: Nose normal.   Cardiovascular:      Rate and Rhythm: Normal rate.   Pulmonary:      Effort: Pulmonary effort is normal. No respiratory distress.   Abdominal:      General: Abdomen is flat. There is no distension.      Palpations: Abdomen is soft.      Tenderness: There is no abdominal tenderness.   Genitourinary:     Comments: Anorectal: +right anterior external hemorrhoid - soft/NT/NT. No other external masses or lesions. No TTP. No fissure. LAW with good tone, no blood, and no palpable masses or lesions.   Musculoskeletal:         General: Normal range of motion.      Cervical back: Normal range of motion and neck supple.   Skin:     General: Skin is warm and dry.   Neurological:      Mental Status: He is alert and oriented to person, place, and time.   Psychiatric:         Mood and Affect: Mood normal.         Behavior: Behavior normal.       Anoscopy Procedure Note    Pre-procedure diagnosis: Rectal bleeding    Post-procedure diagnosis: Internal hemorrhoids    Procedure: Anoscopy    Surgeon: Gabriel Regalado MD    Assistant: John Tang PA-C    Specimen: none    Findings: Anoscope inserted and all 4 quadrants examined. Moderately enlarged left lateral/right anterior/right posterior internal hemorrhoids without evidence of recent bleeding. No other " internal masses or lesions visualized.     Patient tolerated procedure well.      Diagnostic Results:  Colonoscopy 03/2023: reviewed        Assessment and Plan     48 y.o. male with rectal bleeding + discomfort with internal and external hemorrhoids     -Discussed causes and treatment of hemorrhoidal disease including improvement in bowel habits, banding, and excisional hemorrhoidectomy. Patient elected to proceed with excisional hemorrhoidectomy.   -Plan for excisional hemorrhoidectomy 05/07/24, 2 enema prep   -All risks, benefits and alternatives fully explained to patient.  Risks include, but are not limited to, bleeding, infection, fecal incontinence, damage to the sphincter muscles, postoperative abscess, postoperative pain, urinary incontinence, urinary retention, perioperative MI, CVA and death.  All questions appropriately answered to patient's satisfaction.  Consent signed and placed on chart.  -Discussed that a minimum I would recommend behavioral, lifestyle and medication modifications to improve bowel habits. Usual bowel management handout given to patient. This includes a stool softener twice per day, fiber powder supplementation daily, drinking at least 64oz of water/day, avoiding straining with bowel movements, spending less than 5 min on toilet per bowel movement, eating a high fiber diet, using miralax as needed to achieve a bowel movement daily and using wet wipes to wipe after bowel movements when irritated.   -RTC post op    Gabriel Regalado MD  Colon and Rectal Surgery  [facility]

## 2024-04-29 NOTE — H&P (VIEW-ONLY)
History & Physical    Subjective     CC: rectal bleeding and discomfort  Ref: Nito Oscar MD    History of Present Illness:  Patient is a 48 y.o. male presents with rectal bleeding and discomfort.  Onset of symptoms 1-2 years ago.  Patient reports rectal bleeding only with bowel movements.  Reports anal discomfort that is worse after bowel movements where he feels an external lesion that occasionally burns in his tender to palpation.  Feels like he has intermittent flares of external hemorrhoids that cause tenderness.  Patient reports daily bowel movement, takes a fiber capsule daily, does not take any stool softeners or laxatives, does have some hard stools, rarely has to strain, sits on the toilet for more than 5 minutes at a time, drinks more than 64 oz of water daily.  Patient had a colonoscopy 03/2023 with polyps, hemorrhoids, diverticulosis.  Family history includes cousin with Crohn's disease; denies family history of polyps or colorectal cancer.  No anticoagulation.  Patient states that he has used over-the-counter and prescribed steroid creams in the past which have not helped with his symptoms. Denies nausea, vomiting, fevers, chills, abdominal pain, unintentional weight loss.     Interval History:  Since the last clinic visit, the patient has started metamucil powder daily, drinking more water, sitting on toilet for less time, avoiding straining. He has had an improvement of symptoms; has had decreased frequency of rectal discomfort/swelling/pain. Still with some rectal bleeding with bowel movements. Interested in hemorrhoidectomy.     Review of patient's allergies indicates:  No Known Allergies    Current Outpatient Medications   Medication Sig Dispense Refill    atorvastatin (LIPITOR) 20 MG tablet Take 1 tablet (20 mg total) by mouth once daily. 90 tablet 3    citalopram (CELEXA) 20 MG tablet Take 1 tablet (20 mg total) by mouth once daily. 90 tablet 3    clindamycin (CLINDACIN ETZ) 1 % Swab Apply  topically 2 (two) times daily. PRN acne flares of posterior neck. 60 each 1    cyclobenzaprine (FLEXERIL) 10 MG tablet Take 1 tablet (10 mg total) by mouth nightly as needed for Muscle spasms. 30 tablet 0    dextroamphetamine-amphetamine (ADDERALL) 10 mg Tab Take 1 tablet (10 mg total) by mouth 2 (two) times a day. 60 tablet 0    [START ON 5/4/2024] dextroamphetamine-amphetamine (ADDERALL) 10 mg Tab Take 1 tablet (10 mg total) by mouth 2 (two) times a day. 60 tablet 0    [START ON 6/3/2024] dextroamphetamine-amphetamine (ADDERALL) 10 mg Tab Take 1 tablet (10 mg total) by mouth 2 (two) times a day. 60 tablet 0    icosapent ethyL (VASCEPA) 1 gram Cap Take 2 capsules (2 g total) by mouth 2 (two) times daily. 360 capsule 3    ketoconazole (NIZORAL) 2 % cream Apply topically 2 (two) times daily. PRN dryness / flaking. Non-steroid. 60 g 1    ketoconazole (NIZORAL) 2 % shampoo Apply topically every 7 days. Shampoo scalp 1-2 times weekly. Lather x 5 minutes, then rinse well. 120 mL 11    levothyroxine (SYNTHROID) 200 MCG tablet Take 1 tablet (200 mcg total) by mouth before breakfast. 90 tablet 3    pramoxine-hydrocortisone (PROCTOCREAM-HC) 1-1 % rectal cream Place rectally 3 (three) times daily. 3 each 2    sertraline (ZOLOFT) 100 MG tablet Take 1 tablet (100 mg total) by mouth once daily. 90 tablet 3    tadalafiL (CIALIS) 5 MG tablet Take 1 tablet (5 mg total) by mouth daily as needed for Erectile Dysfunction. 90 tablet 3    traZODone (DESYREL) 50 MG tablet Take 2 tablets (100 mg total) by mouth nightly as needed for Insomnia. 180 tablet 3    triamcinolone acetonide 0.025% (KENALOG) 0.025 % cream Apply topically 2 (two) times daily. PRN eczema flares of face. Mild steroid. 15 g 0    valACYclovir (VALTREX) 1000 MG tablet Take 2 tablets by mouth twice daily 30 tablet 1     No current facility-administered medications for this visit.       Past Medical History:   Diagnosis Date    Anxiety     Depression     Diverticulitis      Hyperlipidemia      Past Surgical History:   Procedure Laterality Date    COLONOSCOPY N/A 3/17/2023    Procedure: COLONOSCOPY;  Surgeon: Gregory Serrato MD;  Location: [facility];  Service: Endoscopy;  Laterality: N/A;    testicular vein ligation      VASECTOMY       Family History   Problem Relation Name Age of Onset    Pancreatic cancer Mother      Diabetes Mother      Diabetes Unknown      Coronary artery disease Maternal Grandfather      Coronary artery disease Maternal Grandmother       Social History     Tobacco Use    Smoking status: Former     Current packs/day: 0.00     Types: Cigarettes     Quit date: 2004     Years since quittin.7    Smokeless tobacco: Never   Substance Use Topics    Alcohol use: Yes     Alcohol/week: 0.8 standard drinks of alcohol     Types: 1 Standard drinks or equivalent per week    Drug use: No        Review of Systems:  Review of Systems   Constitutional:  Negative for activity change, appetite change, chills, fatigue, fever and unexpected weight change.   HENT:  Negative for congestion, ear pain, sore throat and trouble swallowing.    Eyes:  Negative for pain, redness and itching.   Respiratory:  Negative for cough, shortness of breath and wheezing.    Cardiovascular:  Negative for chest pain, palpitations and leg swelling.   Gastrointestinal:  Positive for anal bleeding and rectal pain. Negative for abdominal distention, abdominal pain, blood in stool, constipation, diarrhea, nausea and vomiting.   Endocrine: Negative for cold intolerance, heat intolerance and polyuria.   Genitourinary:  Negative for dysuria, flank pain, frequency and hematuria.   Musculoskeletal:  Negative for gait problem, joint swelling and neck pain.   Skin:  Negative for color change, rash and wound.   Allergic/Immunologic: Negative for environmental allergies and immunocompromised state.   Neurological:  Negative for dizziness, speech difficulty, weakness and numbness.   Psychiatric/Behavioral:  " Negative for agitation, confusion and hallucinations.       Objective     Vital Signs (Most Recent)  Temp: 98.4 °F (36.9 °C) (04/29/24 0758)  Pulse: 70 (04/29/24 0758)  BP: 119/76 (04/29/24 0758)  SpO2: 95 % (04/29/24 0758)  5' 11" (1.803 m)  110 kg (242 lb 8.1 oz)     Physical Exam:  Physical Exam  Vitals and nursing note reviewed. Exam conducted with a chaperone present.   Constitutional:       General: He is not in acute distress.     Appearance: Normal appearance. He is well-developed. He is not ill-appearing or toxic-appearing.   HENT:      Head: Normocephalic and atraumatic.      Right Ear: External ear normal.      Left Ear: External ear normal.      Nose: Nose normal.   Cardiovascular:      Rate and Rhythm: Normal rate.   Pulmonary:      Effort: Pulmonary effort is normal. No respiratory distress.   Abdominal:      General: Abdomen is flat. There is no distension.      Palpations: Abdomen is soft.      Tenderness: There is no abdominal tenderness.   Genitourinary:     Comments: Anorectal: +right anterior external hemorrhoid - soft/NT/NT. No other external masses or lesions. No TTP. No fissure. LAW with good tone, no blood, and no palpable masses or lesions.   Musculoskeletal:         General: Normal range of motion.      Cervical back: Normal range of motion and neck supple.   Skin:     General: Skin is warm and dry.   Neurological:      Mental Status: He is alert and oriented to person, place, and time.   Psychiatric:         Mood and Affect: Mood normal.         Behavior: Behavior normal.       Anoscopy Procedure Note    Pre-procedure diagnosis: Rectal bleeding    Post-procedure diagnosis: Internal hemorrhoids    Procedure: Anoscopy    Surgeon: Gabriel Regalado MD    Assistant: John Tang PA-C    Specimen: none    Findings: Anoscope inserted and all 4 quadrants examined. Moderately enlarged left lateral/right anterior/right posterior internal hemorrhoids without evidence of recent bleeding. No other " internal masses or lesions visualized.     Patient tolerated procedure well.      Diagnostic Results:  Colonoscopy 03/2023: reviewed        Assessment and Plan     48 y.o. male with rectal bleeding + discomfort with internal and external hemorrhoids     -Discussed causes and treatment of hemorrhoidal disease including improvement in bowel habits, banding, and excisional hemorrhoidectomy. Patient elected to proceed with excisional hemorrhoidectomy.   -Plan for excisional hemorrhoidectomy 05/07/24, 2 enema prep   -All risks, benefits and alternatives fully explained to patient.  Risks include, but are not limited to, bleeding, infection, fecal incontinence, damage to the sphincter muscles, postoperative abscess, postoperative pain, urinary incontinence, urinary retention, perioperative MI, CVA and death.  All questions appropriately answered to patient's satisfaction.  Consent signed and placed on chart.  -Discussed that a minimum I would recommend behavioral, lifestyle and medication modifications to improve bowel habits. Usual bowel management handout given to patient. This includes a stool softener twice per day, fiber powder supplementation daily, drinking at least 64oz of water/day, avoiding straining with bowel movements, spending less than 5 min on toilet per bowel movement, eating a high fiber diet, using miralax as needed to achieve a bowel movement daily and using wet wipes to wipe after bowel movements when irritated.   -RTC post op    Gabriel Regalado MD  Colon and Rectal Surgery  [facility]

## 2024-04-30 ENCOUNTER — PATIENT MESSAGE (OUTPATIENT)
Dept: PREADMISSION TESTING | Facility: HOSPITAL | Age: 49
End: 2024-04-30
Payer: COMMERCIAL

## 2024-05-01 ENCOUNTER — TELEPHONE (OUTPATIENT)
Dept: PREADMISSION TESTING | Facility: HOSPITAL | Age: 49
End: 2024-05-01
Payer: COMMERCIAL

## 2024-05-01 NOTE — TELEPHONE ENCOUNTER
Pre op instructions reviewed with pt over telephone, verbalized understanding.    To confirm, Surgery is scheduled on 5/7/24. We will call you late afternoon the business day prior to surgery with your arrival time.    *Please report to the [facility] (1st Floor) located off of [facility] (2nd Entrance/Building on the left, in front of the flag pole).  Address: [address] [place] [address] [place] [id number]      INSTRUCTIONS IMPORTANT!!!  DO NOT Eat, Drink, or Smoke after 12 midnight unless instructed otherwise by your Surgeon. OK to brush teeth, no gum, candy or mints!    >>>MEDICATION INSTRUCTIONS<<<: Morning of Surgery, take small sip of water with ONLY these medications:  Wellbutrin  Zoloft  Levothyroxine    *ADHD Medication: Stop taking adderall 48 hrs prior to surgery, as this can affect the anesthesia used. Bariatric surgeries must HOLD 7 Days prior to surgery!    *Diabetic/ Prediabetic Patients: !!!If you take diabetic or weight loss medication, Do NOT take morning of surgery unless instructed by Doctor!!!  Metformin to be stopped 24 hrs prior to surgery.   Long Acting Insulin Instructions: HOLD the night before surgery unless instructed differently by Provider!  Ozempic/ Mounjaro/ Wegovy/ Trulicity/ Semaglutide injections or weight loss medication to be stopped 7 days prior to surgery.    !!!STOP ALL Aspirins, NSAIDS, WEIGHT LOSS INJECTIONS/PILLS, Herbal supplements, & Vitamins 7 DAYS BEFORE SURGERY!!!    ____  Avoid Alcoholic beverages 3 days prior to surgery, as it can thin the blood.  ____  NO Acrylic/fake nails or nail polish worn day of surgery (specifically hand/arm & foot surgeries).  ____  NO powder, lotions, deodorants, oils or cream on body.  ____  Remove all jewelry & piercings & foreign objects before arrival & leave at home.  ____  Remove Dentures, Hearing Aids & Contact Lens prior to surgery.  ____  Bring photo ID and insurance information to hospital (Leave Valuables at  Home).  ____  If going home the same day, arrange for a ride home. You will not be able to drive for 24 hrs if Anesthesia was used.   ____  Females (ages 11-60): may need to give a urine sample the morning of surgery; please see Pre op Nurse prior to using the restroom.  ____  Males: Stop ED medications (Viagra, Cialis) 24 hrs prior to surgery.  ____  Wear clean, loose fitting clothing to allow for dressings/ bandages.      Bathing Instructions:    -Shower with anti-bacterial Soap (Hibiclens or Dial) the night before surgery and the morning of.   -Do not use Hibiclens on your face or genitals.   -Apply clean clothes after shower.  -Do not shave your face or body 2 days prior to surgery unless instructed otherwise by your Surgeon.  -Do not shave pubic hair 7 days prior to surgery (gyn pt's).    [facility] Visitor/Ride Policy:  Only 2 adults allowed in pre op/recovery area during your procedure. You MUST HAVE A RIDE HOME from a responsible adult that you know and trust. Medical Transport, Uber or Lyft can ONLY be used if patient has a responsible adult to accompany them during ride home.       *Signs and symptoms of Infection Before or After Surgery:               !!!If you experience any fever, chills, nausea/ vomiting, foul odor/ excessive drainage from surgical site, flu-like symptoms, new wounds or cuts, PLEASE CALL THE SURGEON OFFICE at [phone] or SEND MESSAGE THROUGH [facility]!!!     *If you are running late the morning of surgery, please call the Hospital Surgery Dept @ [phone].     *Billing questions:  [phone]     Thank you,  -[facility] Pre Admit Dept.  [phone] or [phone]  M-F 7:30 am-4:00 pm (Closed Major Holidays)     no

## 2024-05-01 NOTE — TELEPHONE ENCOUNTER
Patient aware he is to do 2 enemas 2 hrs prior to arrival to hospital, 30 mins apart.   Pt discharged in stable condition with verbalization of discharge instructions all questions answered and all  belongings sent with patient. Patient tolerated xgeva without any complications or signs of a reaction.

## 2024-05-06 ENCOUNTER — ANESTHESIA EVENT (OUTPATIENT)
Dept: SURGERY | Facility: HOSPITAL | Age: 49
End: 2024-05-06
Payer: COMMERCIAL

## 2024-05-06 ENCOUNTER — TELEPHONE (OUTPATIENT)
Dept: PREADMISSION TESTING | Facility: HOSPITAL | Age: 49
End: 2024-05-06
Payer: COMMERCIAL

## 2024-05-06 NOTE — ANESTHESIA PREPROCEDURE EVALUATION
05/06/2024  Migue George is a 48 y.o., male.    Patient Active Problem List   Diagnosis    Hyperlipidemia    Hypothyroid    Diarrhea of presumed infectious origin    Hemorrhoids    Diverticulosis    History of colon polyps    Blood in stool     Past Surgical History:   Procedure Laterality Date    COLONOSCOPY N/A 3/17/2023    Procedure: COLONOSCOPY;  Surgeon: Gregory Serrato MD;  Location: [facility];  Service: Endoscopy;  Laterality: N/A;    testicular vein ligation      VASECTOMY         Pre-op Assessment    I have reviewed the Patient Summary Reports.    I have reviewed the NPO Status.   I have reviewed the Medications.     Review of Systems  Anesthesia Hx:  No problems with previous Anesthesia                Social:  Non-Smoker       Hematology/Oncology:  Hematology Normal                                     Cardiovascular:                hyperlipidemia                             Pulmonary:  Pulmonary Normal                       Renal/:  Renal/ Normal                 Hepatic/GI:  Hepatic/GI Normal       Internal hemorrhoids    External hemorrhoid             Neurological:  Neurology Normal                                      Endocrine:   Hypothyroidism        Obesity / BMI > 30  Psych:   anxiety depression                Physical Exam  General: Well nourished, Cooperative, Alert and Oriented    Airway:  Mallampati: II / II  Mouth Opening: Normal  TM Distance: Normal  Tongue: Normal  Neck ROM: Normal ROM    Dental:  Intact        Anesthesia Plan  Type of Anesthesia, risks & benefits discussed:    Anesthesia Type: Gen ETT, MAC  Intra-op Monitoring Plan: Standard ASA Monitors  Post Op Pain Control Plan: multimodal analgesia  Induction:  IV  Airway Plan: Direct  Informed Consent: Informed consent signed with the Patient and all parties understand the risks and agree with anesthesia plan.   All questions answered.   ASA Score: 2  Day of Surgery Review of History & Physical: H&P Update referred to the surgeon/provider.I have interviewed and examined the patient. I have reviewed the patient's H&P dated: There are no significant changes. H&P completed by Anesthesiologist.    Ready For Surgery From Anesthesia Perspective.     .      Chemistry        Component Value Date/Time     04/29/2024 0859    K 4.1 04/29/2024 0859     04/29/2024 0859    CO2 23 04/29/2024 0859    BUN 11 04/29/2024 0859    CREATININE 0.9 04/29/2024 0859    GLU 94 04/29/2024 0859        Component Value Date/Time    CALCIUM 9.3 04/29/2024 0859    ALKPHOS 55 04/29/2024 0859    AST 20 04/29/2024 0859    ALT 30 04/29/2024 0859    BILITOT 0.5 04/29/2024 0859    ESTGFRAFRICA >60.0 02/23/2015 1120    EGFRNONAA >60.0 02/23/2015 1120        Lab Results   Component Value Date    WBC 5.24 04/29/2024    HGB 14.4 04/29/2024    HCT 42.4 04/29/2024    MCV 80 (L) 04/29/2024     04/29/2024

## 2024-05-06 NOTE — TELEPHONE ENCOUNTER
Called and spoke with patient about the following:     Please arrive to [facility] ([facility] at 5:30am on 5/07/24 for your scheduled procedure.  Address: [address] [place] [address] [place] [address] ([address] Building on left, 1st Floor Lobby)  >>>NO eating or drinking after midnight unless instructed otherwise by your Surgeon<<<    Thank you,  -[facility].  Mon-Fri 7:30 am - 4 pm [phone]

## 2024-05-07 ENCOUNTER — ANESTHESIA (OUTPATIENT)
Dept: SURGERY | Facility: HOSPITAL | Age: 49
End: 2024-05-07
Payer: COMMERCIAL

## 2024-05-07 ENCOUNTER — HOSPITAL ENCOUNTER (OUTPATIENT)
Facility: HOSPITAL | Age: 49
Discharge: HOME | End: 2024-05-07
Attending: COLON & RECTAL SURGERY | Admitting: COLON & RECTAL SURGERY
Payer: COMMERCIAL

## 2024-05-07 DIAGNOSIS — K64.8 INTERNAL HEMORRHOIDS: ICD-10-CM

## 2024-05-07 DIAGNOSIS — K64.8 INTERNAL HEMORRHOID: ICD-10-CM

## 2024-05-07 DIAGNOSIS — K64.4 EXTERNAL HEMORRHOID: ICD-10-CM

## 2024-05-07 PROCEDURE — 36000706: Performed by: COLON & RECTAL SURGERY

## 2024-05-07 PROCEDURE — 63600175 PHARM REV CODE 636 W HCPCS: Performed by: NURSE ANESTHETIST, CERTIFIED REGISTERED

## 2024-05-07 PROCEDURE — 37000009 HC ANESTHESIA EA ADD 15 MINS: Performed by: COLON & RECTAL SURGERY

## 2024-05-07 PROCEDURE — 88304 TISSUE EXAM BY PATHOLOGIST: CPT | Performed by: PATHOLOGY

## 2024-05-07 PROCEDURE — 27201423 OPTIME MED/SURG SUP & DEVICES STERILE SUPPLY: Performed by: COLON & RECTAL SURGERY

## 2024-05-07 PROCEDURE — 88304 TISSUE EXAM BY PATHOLOGIST: CPT | Mod: 26,,, | Performed by: PATHOLOGY

## 2024-05-07 PROCEDURE — 71000033 HC RECOVERY, INTIAL HOUR: Performed by: COLON & RECTAL SURGERY

## 2024-05-07 PROCEDURE — 71000015 HC POSTOP RECOV 1ST HR: Performed by: COLON & RECTAL SURGERY

## 2024-05-07 PROCEDURE — 25000003 PHARM REV CODE 250: Performed by: NURSE ANESTHETIST, CERTIFIED REGISTERED

## 2024-05-07 PROCEDURE — 37000008 HC ANESTHESIA 1ST 15 MINUTES: Performed by: COLON & RECTAL SURGERY

## 2024-05-07 PROCEDURE — 46260 REMOVE IN/EX HEM GROUPS 2+: CPT | Mod: ,,, | Performed by: COLON & RECTAL SURGERY

## 2024-05-07 PROCEDURE — 36000707: Performed by: COLON & RECTAL SURGERY

## 2024-05-07 RX ORDER — MEPERIDINE HYDROCHLORIDE 25 MG/ML
12.5 INJECTION INTRAMUSCULAR; INTRAVENOUS; SUBCUTANEOUS ONCE AS NEEDED
Status: DISCONTINUED | OUTPATIENT
Start: 2024-05-07 | End: 2024-05-07 | Stop reason: HOSPADM

## 2024-05-07 RX ORDER — ONDANSETRON HYDROCHLORIDE 2 MG/ML
4 INJECTION, SOLUTION INTRAVENOUS DAILY PRN
Status: DISCONTINUED | OUTPATIENT
Start: 2024-05-07 | End: 2024-05-07 | Stop reason: HOSPADM

## 2024-05-07 RX ORDER — OXYCODONE HYDROCHLORIDE 5 MG/1
5 TABLET ORAL EVERY 6 HOURS PRN
Qty: 25 TABLET | Refills: 0 | Status: SHIPPED | OUTPATIENT
Start: 2024-05-07

## 2024-05-07 RX ORDER — AMOXICILLIN 250 MG
1 CAPSULE ORAL 2 TIMES DAILY
Qty: 60 TABLET | Refills: 0 | Status: SHIPPED | OUTPATIENT
Start: 2024-05-07

## 2024-05-07 RX ORDER — SODIUM CHLORIDE 9 MG/ML
INJECTION, SOLUTION INTRAVENOUS CONTINUOUS
Status: DISCONTINUED | OUTPATIENT
Start: 2024-05-07 | End: 2024-05-07 | Stop reason: HOSPADM

## 2024-05-07 RX ORDER — DEXAMETHASONE SODIUM PHOSPHATE 4 MG/ML
INJECTION, SOLUTION INTRA-ARTICULAR; INTRALESIONAL; INTRAMUSCULAR; INTRAVENOUS; SOFT TISSUE
Status: DISCONTINUED | OUTPATIENT
Start: 2024-05-07 | End: 2024-05-07

## 2024-05-07 RX ORDER — ONDANSETRON HYDROCHLORIDE 2 MG/ML
4 INJECTION, SOLUTION INTRAVENOUS EVERY 12 HOURS PRN
Status: DISCONTINUED | OUTPATIENT
Start: 2024-05-07 | End: 2024-05-07 | Stop reason: HOSPADM

## 2024-05-07 RX ORDER — SODIUM CHLORIDE, SODIUM LACTATE, POTASSIUM CHLORIDE, CALCIUM CHLORIDE 600; 310; 30; 20 MG/100ML; MG/100ML; MG/100ML; MG/100ML
INJECTION, SOLUTION INTRAVENOUS CONTINUOUS
Status: DISCONTINUED | OUTPATIENT
Start: 2024-05-07 | End: 2024-05-07 | Stop reason: HOSPADM

## 2024-05-07 RX ORDER — KETOROLAC TROMETHAMINE 30 MG/ML
INJECTION, SOLUTION INTRAMUSCULAR; INTRAVENOUS
Status: DISCONTINUED | OUTPATIENT
Start: 2024-05-07 | End: 2024-05-07

## 2024-05-07 RX ORDER — ROCURONIUM BROMIDE 10 MG/ML
INJECTION, SOLUTION INTRAVENOUS
Status: DISCONTINUED | OUTPATIENT
Start: 2024-05-07 | End: 2024-05-07

## 2024-05-07 RX ORDER — ONDANSETRON HYDROCHLORIDE 2 MG/ML
4 INJECTION, SOLUTION INTRAVENOUS ONCE AS NEEDED
Status: DISCONTINUED | OUTPATIENT
Start: 2024-05-07 | End: 2024-05-07 | Stop reason: HOSPADM

## 2024-05-07 RX ORDER — LIDOCAINE HYDROCHLORIDE 10 MG/ML
INJECTION, SOLUTION EPIDURAL; INFILTRATION; INTRACAUDAL; PERINEURAL
Status: DISCONTINUED | OUTPATIENT
Start: 2024-05-07 | End: 2024-05-07

## 2024-05-07 RX ORDER — FENTANYL CITRATE 50 UG/ML
25 INJECTION, SOLUTION INTRAMUSCULAR; INTRAVENOUS EVERY 5 MIN PRN
Status: DISCONTINUED | OUTPATIENT
Start: 2024-05-07 | End: 2024-05-07 | Stop reason: HOSPADM

## 2024-05-07 RX ORDER — OXYCODONE AND ACETAMINOPHEN 5; 325 MG/1; MG/1
1 TABLET ORAL
Status: DISCONTINUED | OUTPATIENT
Start: 2024-05-07 | End: 2024-05-07 | Stop reason: HOSPADM

## 2024-05-07 RX ORDER — ONDANSETRON HYDROCHLORIDE 2 MG/ML
INJECTION, SOLUTION INTRAVENOUS
Status: DISCONTINUED | OUTPATIENT
Start: 2024-05-07 | End: 2024-05-07

## 2024-05-07 RX ORDER — PROPOFOL 10 MG/ML
VIAL (ML) INTRAVENOUS
Status: DISCONTINUED | OUTPATIENT
Start: 2024-05-07 | End: 2024-05-07

## 2024-05-07 RX ORDER — HYDROMORPHONE HYDROCHLORIDE 2 MG/ML
0.2 INJECTION, SOLUTION INTRAMUSCULAR; INTRAVENOUS; SUBCUTANEOUS EVERY 5 MIN PRN
Status: DISCONTINUED | OUTPATIENT
Start: 2024-05-07 | End: 2024-05-07 | Stop reason: HOSPADM

## 2024-05-07 RX ORDER — MIDAZOLAM HYDROCHLORIDE 1 MG/ML
INJECTION INTRAMUSCULAR; INTRAVENOUS
Status: DISCONTINUED | OUTPATIENT
Start: 2024-05-07 | End: 2024-05-07

## 2024-05-07 RX ADMIN — KETOROLAC TROMETHAMINE 30 MG: 30 INJECTION, SOLUTION INTRAMUSCULAR; INTRAVENOUS at 07:05

## 2024-05-07 RX ADMIN — ONDANSETRON 4 MG: 2 INJECTION INTRAMUSCULAR; INTRAVENOUS at 07:05

## 2024-05-07 RX ADMIN — SODIUM CHLORIDE, SODIUM LACTATE, POTASSIUM CHLORIDE, AND CALCIUM CHLORIDE: .6; .31; .03; .02 INJECTION, SOLUTION INTRAVENOUS at 07:05

## 2024-05-07 RX ADMIN — LIDOCAINE HYDROCHLORIDE 50 MG: 10 SOLUTION INTRAVENOUS at 07:05

## 2024-05-07 RX ADMIN — ROCURONIUM BROMIDE 50 MG: 10 INJECTION, SOLUTION INTRAVENOUS at 07:05

## 2024-05-07 RX ADMIN — PROPOFOL 200 MG: 10 INJECTION, EMULSION INTRAVENOUS at 07:05

## 2024-05-07 RX ADMIN — DEXAMETHASONE SODIUM PHOSPHATE 4 MG: 4 INJECTION, SOLUTION INTRA-ARTICULAR; INTRALESIONAL; INTRAMUSCULAR; INTRAVENOUS; SOFT TISSUE at 07:05

## 2024-05-07 RX ADMIN — SUGAMMADEX 200 MG: 100 INJECTION, SOLUTION INTRAVENOUS at 07:05

## 2024-05-07 RX ADMIN — MIDAZOLAM HYDROCHLORIDE 2 MG: 1 INJECTION, SOLUTION INTRAMUSCULAR; INTRAVENOUS at 07:05

## 2024-05-07 NOTE — OP NOTE
Formerly [facility], [facility] - Surgery ([facility])  Surgery Department  Operative Note    SUMMARY     Date of Procedure: 5/7/2024     Procedure:  Two-column excisional hemorrhoidectomy  Pudendal nerve block    Surgeons and Role:     * Gabriel Regalado MD - Primary    Assisting Surgeon: None    Pre-Operative Diagnosis: Internal hemorrhoids [K64.8]  External hemorrhoid [K64.4]    Post-Operative Diagnosis: Post-Op Diagnosis Codes:     * Internal hemorrhoids [K64.8]     * External hemorrhoid [K64.4]    Anesthesia: General    Technical Procedures Used:   Two-column excisional hemorrhoidectomy  Pudendal nerve block    Indications for Procedure:  48-year-old male with symptomatic hemorrhoids who presents for definitive surgical management    Findings of the Procedure:  Mildly enlarged right anterior and right posterior hemorrhoids with minimal to no enlargement of the left lateral hemorrhoid    Description of the Procedure:  Patient was brought to the operating room and placed supine on table.  General endotracheal anesthesia was then induced.  The patient was then moved to the prone suha-knife position.  The anus and perineum were then prepped and draped in usual sterile fashion.  A preoperative surgical time-out was performed confirming the correct patient, procedure and preop medications given.  A digital rectal exam was performed confirming no palpable internal abnormalities.  There was no internal masses that were palpable.  There was no enlargement of the external hemorrhoids at this time.  A pudendal nerve block was then performed using a mixture of 20 cc of Exparel and 30 cc of 0.25% bupivacaine plain.  10 cc injected subdermally around the anal verge, 20 cc injected bilaterally into the intersphincteric space and 20 cc injected bilaterally into the ischial fossa for total of 50 cc given for the block.  The lighted Hill-Pineda retractor was inserted to the anus in all 4 quadrants were examined.  There was mild enlargement of the  right anterior and right posterior internal hemorrhoidal columns.  There was minimal to no enlargement of the left lateral internal hemorrhoidal column at this time.  Decision was made to perform a two-column excisional hemorrhoidectomy in the right posterior and right anterior hemorrhoidal columns.  Attention was turned 1st to the right anterior column.  The hemorrhoid was then grasped and elevated and everted into the field using an Allis clamp.  The hemorrhoid was then excised using a hand-held LigaSure from the anal verge all the way to the apex with care taken to not incorporate any surrounding structures including the muscle.  Once it was taken using the LigaSure, it was sent off the field for final pathology.  The excision site coapted well and there was no defect and there was no bleeding.  A similar hemorrhoidectomy was then performed on the right posterior column.  Once this was completed the anus and perineum were then checked and found to be completely hemostatic.  The anal verge and perianal area were checked and found to again have no enlarged external hemorrhoidal tissue at this time.  Surgical dressings were then applied.  The patient was then moved back into the supine position.  He was woken from general endotracheal anesthesia and taken to the postanesthesia care in stable condition.  He tolerated procedure well.  All sponge, needle and instrument counts were correct at the end of the case.    Significant Surgical Tasks Conducted by the Assistant(s), if Applicable: Assisted with all portions of the operation as it was required to help with the positioning, exposure and closure to complete the operation    Complications: No    Estimated Blood Loss (EBL): * No values recorded between 5/7/2024  7:30 AM and 5/7/2024  7:40 AM *           Implants: * No implants in log *    Specimens:   Specimen (24h ago, onward)      None                    Condition: Good    Disposition: PACU - hemodynamically  stable.    Attestation: I performed the procedure.

## 2024-05-07 NOTE — ANESTHESIA PROCEDURE NOTES
Intubation    Date/Time: 5/7/2024 7:12 AM    Performed by: Barbra Jama CRNA  Authorized by: Marva Larose MD    Intubation:     Induction:  Intravenous    Intubated:  Postinduction    Mask Ventilation:  Easy mask    Attempts:  1    Attempted By:  CRNA    Method of Intubation:  Direct    Blade:  Jean-Paul 3    Laryngeal View Grade: Grade I - full view of cords      Difficult Airway Encountered?: No      Complications:  None    Airway Device:  Oral endotracheal tube    Airway Device Size:  7.5    Style/Cuff Inflation:  Cuffed (inflated to minimal occlusive pressure)    Tube secured:  23    Secured at:  The lips    Placement Verified By:  Capnometry and Revisualization with laryngoscopy    Complicating Factors:  None    Findings Post-Intubation:  BS equal bilateral and atraumatic/condition of teeth unchanged

## 2024-05-07 NOTE — TRANSFER OF CARE
"Anesthesia Transfer of Care Note    Patient: Migue George    Procedure(s) Performed: Procedure(s) (LRB):  HEMORRHOIDECTOMY (N/A)  BLOCK, NERVE, PUDENDAL (N/A)    Patient location: PACU    Anesthesia Type: general    Transport from OR: Transported from OR on room air with adequate spontaneous ventilation    Post pain: adequate analgesia    Post assessment: no apparent anesthetic complications    Post vital signs: stable    Level of consciousness: sedated    Nausea/Vomiting: no nausea/vomiting    Complications: none    Transfer of care protocol was followed      Last vitals: Visit Vitals  BP (!) 148/74   Pulse 63   Temp 36.7 °C (98.1 °F) (Temporal)   Resp 18   Ht 5' 11" (1.803 m)   Wt 106.5 kg (234 lb 12.6 oz)   SpO2 96%   BMI 32.75 kg/m²     "

## 2024-05-07 NOTE — ANESTHESIA POSTPROCEDURE EVALUATION
Anesthesia Post Evaluation    Patient: Migue George    Procedure(s) Performed: Procedure(s) (LRB):  HEMORRHOIDECTOMY (N/A)  BLOCK, NERVE, PUDENDAL (N/A)    Final Anesthesia Type: general      Patient location during evaluation: PACU  Patient participation: Yes- Able to Participate  Level of consciousness: awake and alert, oriented and awake  Post-procedure vital signs: reviewed and stable  Pain management: adequate  Airway patency: patent    PONV status at discharge: No PONV  Anesthetic complications: no      Cardiovascular status: blood pressure returned to baseline  Respiratory status: unassisted  Hydration status: euvolemic  Follow-up not needed.              Vitals Value Taken Time   /68 05/07/24 0830   Temp 36.5 °C (97.7 °F) 05/07/24 0805   Pulse 77 05/07/24 0830   Resp 20 05/07/24 0830   SpO2 96 % 05/07/24 0829   Vitals shown include unfiled device data.      Event Time   Out of Recovery 08:30:00         Pain/Usha Score: Usha Score: 10 (5/7/2024  8:30 AM)

## 2024-05-08 ENCOUNTER — PATIENT MESSAGE (OUTPATIENT)
Dept: SURGERY | Facility: CLINIC | Age: 49
End: 2024-05-08
Payer: COMMERCIAL

## 2024-05-08 ENCOUNTER — TELEPHONE (OUTPATIENT)
Dept: SURGERY | Facility: CLINIC | Age: 49
End: 2024-05-08
Payer: COMMERCIAL

## 2024-05-08 NOTE — TELEPHONE ENCOUNTER
RN called pt back in regards to message left. RN informed pt form has been faxed to number on form. Pt would like a physical copy for his own records. RN informed pt of clinic hours so he can  a copy. Pt verbalized understanding and has no further questions at this time.     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 Please call pt [phone]. Thank you

## 2024-05-09 VITALS
DIASTOLIC BLOOD PRESSURE: 69 MMHG | HEIGHT: 71 IN | TEMPERATURE: 98 F | OXYGEN SATURATION: 97 % | WEIGHT: 234.81 LBS | HEART RATE: 85 BPM | RESPIRATION RATE: 15 BRPM | BODY MASS INDEX: 32.87 KG/M2 | SYSTOLIC BLOOD PRESSURE: 149 MMHG

## 2024-05-09 LAB
FINAL PATHOLOGIC DIAGNOSIS: NORMAL
GROSS: NORMAL
Lab: NORMAL

## 2024-05-23 ENCOUNTER — PATIENT MESSAGE (OUTPATIENT)
Dept: SURGERY | Facility: CLINIC | Age: 49
End: 2024-05-23
Payer: COMMERCIAL

## 2024-05-29 DIAGNOSIS — E03.9 ACQUIRED HYPOTHYROIDISM: ICD-10-CM

## 2024-05-30 RX ORDER — LEVOTHYROXINE SODIUM 200 UG/1
200 TABLET ORAL
Qty: 90 TABLET | Refills: 1 | Status: SHIPPED | OUTPATIENT
Start: 2024-05-30

## 2024-05-30 NOTE — TELEPHONE ENCOUNTER
No care due was identified.  [facility] Embedded Care Due Messages. Reference number: [id number].   5/29/2024 11:07:49 PM CDT

## 2024-05-30 NOTE — TELEPHONE ENCOUNTER
Refill Decision Note   Migue George  is requesting a refill authorization.  Brief Assessment and Rationale for Refill:  Approve     Medication Therapy Plan:         Comments:     Note composed:5:47 AM 05/30/2024

## 2024-06-10 ENCOUNTER — OFFICE VISIT (OUTPATIENT)
Dept: SURGERY | Facility: CLINIC | Age: 49
End: 2024-06-10
Payer: COMMERCIAL

## 2024-06-10 VITALS
DIASTOLIC BLOOD PRESSURE: 75 MMHG | WEIGHT: 239.19 LBS | HEART RATE: 77 BPM | SYSTOLIC BLOOD PRESSURE: 139 MMHG | HEIGHT: 71 IN | OXYGEN SATURATION: 96 % | TEMPERATURE: 98 F | BODY MASS INDEX: 33.48 KG/M2

## 2024-06-10 DIAGNOSIS — K64.8 INTERNAL HEMORRHOIDS: Primary | ICD-10-CM

## 2024-06-10 DIAGNOSIS — K64.4 EXTERNAL HEMORRHOID: ICD-10-CM

## 2024-06-10 PROCEDURE — 99999 PR PBB SHADOW E&M-EST. PATIENT-LVL IV: CPT | Mod: PBBFAC,,, | Performed by: COLON & RECTAL SURGERY

## 2024-06-10 PROCEDURE — 3075F SYST BP GE 130 - 139MM HG: CPT | Mod: CPTII,S$GLB,, | Performed by: COLON & RECTAL SURGERY

## 2024-06-10 PROCEDURE — 99024 POSTOP FOLLOW-UP VISIT: CPT | Mod: S$GLB,,, | Performed by: COLON & RECTAL SURGERY

## 2024-06-10 PROCEDURE — 1159F MED LIST DOCD IN RCRD: CPT | Mod: CPTII,S$GLB,, | Performed by: COLON & RECTAL SURGERY

## 2024-06-10 PROCEDURE — 3078F DIAST BP <80 MM HG: CPT | Mod: CPTII,S$GLB,, | Performed by: COLON & RECTAL SURGERY

## 2024-06-10 NOTE — PROGRESS NOTES
History & Physical    Subjective     CC: rectal bleeding and discomfort  Ref: Nito Oscar MD    History of Present Illness:  Patient is a 48 y.o. male presents with rectal bleeding and discomfort.  Onset of symptoms 1-2 years ago.  Patient reports rectal bleeding only with bowel movements.  Reports anal discomfort that is worse after bowel movements where he feels an external lesion that occasionally burns in his tender to palpation.  Feels like he has intermittent flares of external hemorrhoids that cause tenderness.  Patient reports daily bowel movement, takes a fiber capsule daily, does not take any stool softeners or laxatives, does have some hard stools, rarely has to strain, sits on the toilet for more than 5 minutes at a time, drinks more than 64 oz of water daily.  Patient had a colonoscopy 03/2023 with polyps, hemorrhoids, diverticulosis.  Family history includes cousin with Crohn's disease; denies family history of polyps or colorectal cancer.  No anticoagulation.  Patient states that he has used over-the-counter and prescribed steroid creams in the past which have not helped with his symptoms. Denies nausea, vomiting, fevers, chills, abdominal pain, unintentional weight loss.     05/07/24: two-column excisional hemorrhoidectomy     Interval History:  Since the last clinic visit, the patient underwent two-column excisional hemorrhoidectomy. Had some discomfort for about 1 week after surgery. No longer having any rectal pain, discomfort, bleeding. No complaints today as he is happy with results s/p hemorrhoidectomy. He is continuing metamucil powder daily, drinking more water, sitting on toilet for less time, avoiding straining. Tolerating a regular diet and having soft, easy to pass bowel movements. Denies nausea, vomiting, fevers, chills.     Review of patient's allergies indicates:  No Known Allergies    Current Outpatient Medications   Medication Sig Dispense Refill    atorvastatin (LIPITOR) 20 MG  tablet Take 1 tablet (20 mg total) by mouth once daily. 90 tablet 3    citalopram (CELEXA) 20 MG tablet Take 1 tablet (20 mg total) by mouth once daily. (Patient not taking: Reported on 5/1/2024) 90 tablet 3    clindamycin (CLINDACIN ETZ) 1 % Swab Apply topically 2 (two) times daily. PRN acne flares of posterior neck. 60 each 1    cyclobenzaprine (FLEXERIL) 10 MG tablet Take 1 tablet (10 mg total) by mouth nightly as needed for Muscle spasms. (Patient not taking: Reported on 5/1/2024) 30 tablet 0    dextroamphetamine-amphetamine (ADDERALL) 10 mg Tab Take 1 tablet (10 mg total) by mouth 2 (two) times a day. 60 tablet 0    icosapent ethyL (VASCEPA) 1 gram Cap Take 2 capsules (2 g total) by mouth 2 (two) times daily. 360 capsule 3    ketoconazole (NIZORAL) 2 % cream Apply topically 2 (two) times daily. PRN dryness / flaking. Non-steroid. 60 g 1    ketoconazole (NIZORAL) 2 % shampoo Apply topically every 7 days. Shampoo scalp 1-2 times weekly. Lather x 5 minutes, then rinse well. 120 mL 11    levothyroxine (SYNTHROID) 200 MCG tablet TAKE 1 TABLET BEFORE BREAKFAST 90 tablet 1    oxyCODONE (ROXICODONE) 5 MG immediate release tablet Take 1 tablet (5 mg total) by mouth every 6 (six) hours as needed for Pain. 25 tablet 0    pramoxine-hydrocortisone (PROCTOCREAM-HC) 1-1 % rectal cream Place rectally 3 (three) times daily. 3 each 2    senna-docusate 8.6-50 mg (SENNA WITH DOCUSATE SODIUM) 8.6-50 mg per tablet Take 1 tablet by mouth 2 (two) times a day. Stop taking if having diarrhea 60 tablet 0    sertraline (ZOLOFT) 100 MG tablet Take 1 tablet (100 mg total) by mouth once daily. 90 tablet 3    tadalafiL (CIALIS) 5 MG tablet Take 1 tablet (5 mg total) by mouth daily as needed for Erectile Dysfunction. 90 tablet 3    traZODone (DESYREL) 50 MG tablet Take 2 tablets (100 mg total) by mouth nightly as needed for Insomnia. 180 tablet 3    triamcinolone acetonide 0.025% (KENALOG) 0.025 % cream Apply topically 2 (two) times daily. PRN  eczema flares of face. Mild steroid. 15 g 0    valACYclovir (VALTREX) 1000 MG tablet Take 2 tablets by mouth twice daily 30 tablet 1     No current facility-administered medications for this visit.       Past Medical History:   Diagnosis Date    Anxiety     Depression     Diverticulitis     Hyperlipidemia      Past Surgical History:   Procedure Laterality Date    COLONOSCOPY N/A 3/17/2023    Procedure: COLONOSCOPY;  Surgeon: Gregory Serrato MD;  Location: [facility];  Service: Endoscopy;  Laterality: N/A;    EXCISIONAL HEMORRHOIDECTOMY N/A 2024    Procedure: HEMORRHOIDECTOMY;  Surgeon: Gabriel Regalado MD;  Location: [facility];  Service: General;  Laterality: N/A;    INJECTION OF ANESTHETIC AGENT AROUND PUDENDAL NERVE N/A 2024    Procedure: BLOCK, NERVE, PUDENDAL;  Surgeon: Gabriel Regalado MD;  Location: [facility];  Service: General;  Laterality: N/A;    testicular vein ligation      VASECTOMY       Family History   Problem Relation Name Age of Onset    Pancreatic cancer Mother      Diabetes Mother      Diabetes Unknown      Coronary artery disease Maternal Grandfather      Coronary artery disease Maternal Grandmother       Social History     Tobacco Use    Smoking status: Former     Current packs/day: 0.00     Types: Cigarettes     Quit date: 2004     Years since quittin.8    Smokeless tobacco: Never   Substance Use Topics    Alcohol use: Yes     Alcohol/week: 0.8 standard drinks of alcohol     Types: 1 Standard drinks or equivalent per week    Drug use: No        Review of Systems:  Review of Systems   Constitutional:  Negative for activity change, appetite change, chills, fatigue, fever and unexpected weight change.   HENT:  Negative for congestion, ear pain, sore throat and trouble swallowing.    Eyes:  Negative for pain, redness and itching.   Respiratory:  Negative for cough, shortness of breath and wheezing.    Cardiovascular:  Negative for chest pain, palpitations and leg swelling.  "  Gastrointestinal:  Negative for abdominal distention, abdominal pain, anal bleeding, blood in stool, constipation, diarrhea, nausea, rectal pain and vomiting.   Endocrine: Negative for cold intolerance, heat intolerance and polyuria.   Genitourinary:  Negative for dysuria, flank pain, frequency and hematuria.   Musculoskeletal:  Negative for gait problem, joint swelling and neck pain.   Skin:  Negative for color change, rash and wound.   Allergic/Immunologic: Negative for environmental allergies and immunocompromised state.   Neurological:  Negative for dizziness, speech difficulty, weakness and numbness.   Psychiatric/Behavioral:  Negative for agitation, confusion and hallucinations.       Objective     Vital Signs (Most Recent)  Temp: 97.9 °F (36.6 °C) (06/10/24 0939)  Pulse: 77 (06/10/24 0939)  BP: 139/75 (06/10/24 0939)  SpO2: 96 % (06/10/24 0939)  5' 11" (1.803 m)  108.5 kg (239 lb 3.2 oz)     Physical Exam:  Physical Exam  Vitals reviewed. Exam conducted with a chaperone present.   Constitutional:       General: He is not in acute distress.     Appearance: Normal appearance. He is well-developed. He is not ill-appearing or toxic-appearing.   HENT:      Head: Normocephalic and atraumatic.      Right Ear: External ear normal.      Left Ear: External ear normal.      Nose: Nose normal.   Cardiovascular:      Rate and Rhythm: Normal rate.   Pulmonary:      Effort: Pulmonary effort is normal. No respiratory distress.   Abdominal:      General: Abdomen is flat. There is no distension.      Palpations: Abdomen is soft.      Tenderness: There is no abdominal tenderness.   Genitourinary:     Comments: Anorectal: offered but deferred  Musculoskeletal:         General: Normal range of motion.      Cervical back: Normal range of motion and neck supple.   Skin:     General: Skin is warm and dry.   Neurological:      Mental Status: He is alert and oriented to person, place, and time.   Psychiatric:         Mood and " Affect: Mood normal.         Behavior: Behavior normal.         Diagnostic Results:  Colonoscopy 03/2023: reviewed       Component 1 mo ago   Final Pathologic Diagnosis 1. Hemorrhoid, right anterior (excision):  Benign squamous rectal mucosa.  Underlying dilated congested vessels.  Consistent with hemorrhoid.      2. Hemorrhoid, left posterior (excision):  Benign squamous rectal mucosa.  Underlying dilated congested vessels.  Consistent with hemorrhoid.          Assessment and Plan     48 y.o. male with rectal bleeding + discomfort with internal and external hemorrhoids s/p two-column excisional hemorrhoidectomy 05/07/24 who is doing well with no complaints     -pathology reviewed  -Discussed causes and treatment of hemorrhoidal disease including improvement in bowel habits, banding, and excisional hemorrhoidectomy.   -Discussed ways to prevent hemorrhoid recurrence. Discussed that a minimum I would recommend behavioral, lifestyle and medication modifications to improve bowel habits. Usual bowel management handout given to patient. This includes a stool softener twice per day, fiber powder supplementation daily, drinking at least 64oz of water/day, avoiding straining with bowel movements, spending less than 5 min on toilet per bowel movement, eating a high fiber diet, using miralax as needed to achieve a bowel movement daily and using wet wipes to wipe after bowel movements when irritated.   -RTC PRN     Gabriel Regalado MD  Colon and Rectal Surgery  [facility]

## 2024-06-19 ENCOUNTER — TELEPHONE (OUTPATIENT)
Dept: PSYCHIATRY | Facility: CLINIC | Age: 49
End: 2024-06-19
Payer: COMMERCIAL

## 2024-06-19 ENCOUNTER — LAB VISIT (OUTPATIENT)
Dept: LAB | Facility: HOSPITAL | Age: 49
End: 2024-06-19
Attending: FAMILY MEDICINE
Payer: COMMERCIAL

## 2024-06-19 DIAGNOSIS — F41.9 ANXIETY AND DEPRESSION: ICD-10-CM

## 2024-06-19 DIAGNOSIS — F52.0 DECREASED SEXUAL DESIRE: ICD-10-CM

## 2024-06-19 DIAGNOSIS — E78.2 MIXED HYPERLIPIDEMIA: ICD-10-CM

## 2024-06-19 DIAGNOSIS — M79.605 LEG PAIN, BILATERAL: ICD-10-CM

## 2024-06-19 DIAGNOSIS — F32.A ANXIETY AND DEPRESSION: ICD-10-CM

## 2024-06-19 DIAGNOSIS — M79.604 LEG PAIN, BILATERAL: ICD-10-CM

## 2024-06-19 DIAGNOSIS — Z12.5 ENCOUNTER FOR SCREENING FOR MALIGNANT NEOPLASM OF PROSTATE: ICD-10-CM

## 2024-06-19 DIAGNOSIS — R73.03 PREDIABETES: ICD-10-CM

## 2024-06-19 DIAGNOSIS — M79.10 MUSCULAR ACHES: ICD-10-CM

## 2024-06-19 DIAGNOSIS — E03.9 ACQUIRED HYPOTHYROIDISM: ICD-10-CM

## 2024-06-19 LAB
ALBUMIN SERPL BCP-MCNC: 4.1 G/DL (ref 3.5–5.2)
ALP SERPL-CCNC: 67 U/L (ref 55–135)
ALT SERPL W/O P-5'-P-CCNC: 29 U/L (ref 10–44)
ANION GAP SERPL CALC-SCNC: 8 MMOL/L (ref 8–16)
AST SERPL-CCNC: 19 U/L (ref 10–40)
BASOPHILS # BLD AUTO: 0.02 K/UL (ref 0–0.2)
BASOPHILS NFR BLD: 0.5 % (ref 0–1.9)
BILIRUB SERPL-MCNC: 0.3 MG/DL (ref 0.1–1)
BUN SERPL-MCNC: 11 MG/DL (ref 6–20)
CALCIUM SERPL-MCNC: 9.4 MG/DL (ref 8.7–10.5)
CCP AB SER IA-ACNC: <0.5 U/ML
CHLORIDE SERPL-SCNC: 106 MMOL/L (ref 95–110)
CHOLEST SERPL-MCNC: 142 MG/DL (ref 120–199)
CHOLEST/HDLC SERPL: 3.6 {RATIO} (ref 2–5)
CO2 SERPL-SCNC: 27 MMOL/L (ref 23–29)
COMPLEXED PSA SERPL-MCNC: 0.53 NG/ML (ref 0–4)
CREAT SERPL-MCNC: 1 MG/DL (ref 0.5–1.4)
CRP SERPL-MCNC: 2 MG/L (ref 0–8.2)
DIFFERENTIAL METHOD BLD: ABNORMAL
EOSINOPHIL # BLD AUTO: 0.2 K/UL (ref 0–0.5)
EOSINOPHIL NFR BLD: 5.3 % (ref 0–8)
ERYTHROCYTE [DISTWIDTH] IN BLOOD BY AUTOMATED COUNT: 14.5 % (ref 11.5–14.5)
ERYTHROCYTE [SEDIMENTATION RATE] IN BLOOD BY PHOTOMETRIC METHOD: <2 MM/HR (ref 0–23)
EST. GFR  (NO RACE VARIABLE): >60 ML/MIN/1.73 M^2
ESTIMATED AVG GLUCOSE: 117 MG/DL (ref 68–131)
GLUCOSE SERPL-MCNC: 102 MG/DL (ref 70–110)
HBA1C MFR BLD: 5.7 % (ref 4–5.6)
HCT VFR BLD AUTO: 45.7 % (ref 40–54)
HDLC SERPL-MCNC: 39 MG/DL (ref 40–75)
HDLC SERPL: 27.5 % (ref 20–50)
HGB BLD-MCNC: 14.8 G/DL (ref 14–18)
IMM GRANULOCYTES # BLD AUTO: 0.02 K/UL (ref 0–0.04)
IMM GRANULOCYTES NFR BLD AUTO: 0.5 % (ref 0–0.5)
LDLC SERPL CALC-MCNC: 72.6 MG/DL (ref 63–159)
LYMPHOCYTES # BLD AUTO: 0.9 K/UL (ref 1–4.8)
LYMPHOCYTES NFR BLD: 23.4 % (ref 18–48)
MCH RBC QN AUTO: 26.7 PG (ref 27–31)
MCHC RBC AUTO-ENTMCNC: 32.4 G/DL (ref 32–36)
MCV RBC AUTO: 83 FL (ref 82–98)
MONOCYTES # BLD AUTO: 0.4 K/UL (ref 0.3–1)
MONOCYTES NFR BLD: 9.4 % (ref 4–15)
NEUTROPHILS # BLD AUTO: 2.4 K/UL (ref 1.8–7.7)
NEUTROPHILS NFR BLD: 60.9 % (ref 38–73)
NONHDLC SERPL-MCNC: 103 MG/DL
NRBC BLD-RTO: 0 /100 WBC
PLATELET # BLD AUTO: 203 K/UL (ref 150–450)
PMV BLD AUTO: 12 FL (ref 9.2–12.9)
POTASSIUM SERPL-SCNC: 4.7 MMOL/L (ref 3.5–5.1)
PROT SERPL-MCNC: 7.1 G/DL (ref 6–8.4)
RBC # BLD AUTO: 5.54 M/UL (ref 4.6–6.2)
RHEUMATOID FACT SERPL-ACNC: <13 IU/ML (ref 0–15)
SODIUM SERPL-SCNC: 141 MMOL/L (ref 136–145)
TESTOST SERPL-MCNC: 404 NG/DL (ref 304–1227)
TRIGL SERPL-MCNC: 152 MG/DL (ref 30–150)
TSH SERPL DL<=0.005 MIU/L-ACNC: 1.57 UIU/ML (ref 0.4–4)
WBC # BLD AUTO: 3.94 K/UL (ref 3.9–12.7)

## 2024-06-19 PROCEDURE — 84443 ASSAY THYROID STIM HORMONE: CPT | Performed by: FAMILY MEDICINE

## 2024-06-19 PROCEDURE — 85652 RBC SED RATE AUTOMATED: CPT | Performed by: FAMILY MEDICINE

## 2024-06-19 PROCEDURE — 86200 CCP ANTIBODY: CPT | Performed by: FAMILY MEDICINE

## 2024-06-19 PROCEDURE — 84153 ASSAY OF PSA TOTAL: CPT | Performed by: FAMILY MEDICINE

## 2024-06-19 PROCEDURE — 36415 COLL VENOUS BLD VENIPUNCTURE: CPT | Mod: PN | Performed by: FAMILY MEDICINE

## 2024-06-19 PROCEDURE — 84403 ASSAY OF TOTAL TESTOSTERONE: CPT | Performed by: FAMILY MEDICINE

## 2024-06-19 PROCEDURE — 80053 COMPREHEN METABOLIC PANEL: CPT | Performed by: FAMILY MEDICINE

## 2024-06-19 PROCEDURE — 85025 COMPLETE CBC W/AUTO DIFF WBC: CPT | Performed by: FAMILY MEDICINE

## 2024-06-19 PROCEDURE — 83036 HEMOGLOBIN GLYCOSYLATED A1C: CPT | Performed by: FAMILY MEDICINE

## 2024-06-19 PROCEDURE — 80061 LIPID PANEL: CPT | Performed by: FAMILY MEDICINE

## 2024-06-19 PROCEDURE — 86140 C-REACTIVE PROTEIN: CPT | Performed by: FAMILY MEDICINE

## 2024-06-19 PROCEDURE — 86431 RHEUMATOID FACTOR QUANT: CPT | Performed by: FAMILY MEDICINE

## 2024-07-15 ENCOUNTER — OFFICE VISIT (OUTPATIENT)
Dept: PRIMARY CARE CLINIC | Facility: CLINIC | Age: 49
End: 2024-07-15
Payer: COMMERCIAL

## 2024-07-15 VITALS
SYSTOLIC BLOOD PRESSURE: 112 MMHG | BODY MASS INDEX: 34.94 KG/M2 | HEART RATE: 80 BPM | HEIGHT: 71 IN | DIASTOLIC BLOOD PRESSURE: 78 MMHG | WEIGHT: 249.56 LBS | TEMPERATURE: 98 F

## 2024-07-15 DIAGNOSIS — E78.1 HYPERTRIGLYCERIDEMIA: ICD-10-CM

## 2024-07-15 DIAGNOSIS — G47.26 SHIFT WORK SLEEP DISORDER: ICD-10-CM

## 2024-07-15 DIAGNOSIS — R79.89 LOW TESTOSTERONE: ICD-10-CM

## 2024-07-15 DIAGNOSIS — R50.9 FEVER, UNSPECIFIED FEVER CAUSE: ICD-10-CM

## 2024-07-15 DIAGNOSIS — F90.9 ATTENTION DEFICIT HYPERACTIVITY DISORDER (ADHD), UNSPECIFIED ADHD TYPE: ICD-10-CM

## 2024-07-15 DIAGNOSIS — E78.2 MIXED HYPERLIPIDEMIA: ICD-10-CM

## 2024-07-15 DIAGNOSIS — R05.9 COUGH, UNSPECIFIED TYPE: ICD-10-CM

## 2024-07-15 DIAGNOSIS — E03.9 ACQUIRED HYPOTHYROIDISM: ICD-10-CM

## 2024-07-15 DIAGNOSIS — Z00.00 ANNUAL PHYSICAL EXAM: Primary | ICD-10-CM

## 2024-07-15 DIAGNOSIS — N52.9 ERECTILE DYSFUNCTION, UNSPECIFIED ERECTILE DYSFUNCTION TYPE: ICD-10-CM

## 2024-07-15 PROCEDURE — 1159F MED LIST DOCD IN RCRD: CPT | Mod: CPTII,S$GLB,, | Performed by: FAMILY MEDICINE

## 2024-07-15 PROCEDURE — 99999 PR PBB SHADOW E&M-EST. PATIENT-LVL V: CPT | Mod: PBBFAC,,, | Performed by: FAMILY MEDICINE

## 2024-07-15 PROCEDURE — 3074F SYST BP LT 130 MM HG: CPT | Mod: CPTII,S$GLB,, | Performed by: FAMILY MEDICINE

## 2024-07-15 PROCEDURE — 3008F BODY MASS INDEX DOCD: CPT | Mod: CPTII,S$GLB,, | Performed by: FAMILY MEDICINE

## 2024-07-15 PROCEDURE — 99396 PREV VISIT EST AGE 40-64: CPT | Mod: S$GLB,,, | Performed by: FAMILY MEDICINE

## 2024-07-15 PROCEDURE — 3078F DIAST BP <80 MM HG: CPT | Mod: CPTII,S$GLB,, | Performed by: FAMILY MEDICINE

## 2024-07-15 PROCEDURE — 3044F HG A1C LEVEL LT 7.0%: CPT | Mod: CPTII,S$GLB,, | Performed by: FAMILY MEDICINE

## 2024-07-15 RX ORDER — ATORVASTATIN CALCIUM 20 MG/1
20 TABLET, FILM COATED ORAL DAILY
Qty: 90 TABLET | Refills: 3 | Status: SHIPPED | OUTPATIENT
Start: 2024-07-15

## 2024-07-15 RX ORDER — TADALAFIL 5 MG/1
5 TABLET ORAL DAILY PRN
Qty: 90 TABLET | Refills: 3 | Status: SHIPPED | OUTPATIENT
Start: 2024-07-15 | End: 2025-07-15

## 2024-07-15 RX ORDER — DEXTROAMPHETAMINE SACCHARATE, AMPHETAMINE ASPARTATE, DEXTROAMPHETAMINE SULFATE AND AMPHETAMINE SULFATE 2.5; 2.5; 2.5; 2.5 MG/1; MG/1; MG/1; MG/1
1 TABLET ORAL 2 TIMES DAILY
Qty: 60 TABLET | Refills: 0 | Status: SHIPPED | OUTPATIENT
Start: 2024-09-13 | End: 2024-10-13

## 2024-07-15 RX ORDER — TRAZODONE HYDROCHLORIDE 50 MG/1
100 TABLET ORAL NIGHTLY PRN
Qty: 180 TABLET | Refills: 3 | Status: SHIPPED | OUTPATIENT
Start: 2024-07-15 | End: 2025-07-15

## 2024-07-15 RX ORDER — DEXTROAMPHETAMINE SACCHARATE, AMPHETAMINE ASPARTATE, DEXTROAMPHETAMINE SULFATE AND AMPHETAMINE SULFATE 2.5; 2.5; 2.5; 2.5 MG/1; MG/1; MG/1; MG/1
1 TABLET ORAL 2 TIMES DAILY
Qty: 60 TABLET | Refills: 0 | Status: SHIPPED | OUTPATIENT
Start: 2024-07-15 | End: 2024-08-14

## 2024-07-15 RX ORDER — LEVOTHYROXINE SODIUM 200 UG/1
200 TABLET ORAL
Qty: 90 TABLET | Refills: 3 | Status: SHIPPED | OUTPATIENT
Start: 2024-07-15

## 2024-07-15 RX ORDER — ICOSAPENT ETHYL 1 G/1
2 CAPSULE ORAL 2 TIMES DAILY
Qty: 360 CAPSULE | Refills: 3 | Status: SHIPPED | OUTPATIENT
Start: 2024-07-15

## 2024-07-15 RX ORDER — VORTIOXETINE 10 MG/1
1 TABLET, FILM COATED ORAL
COMMUNITY
Start: 2024-07-09

## 2024-07-15 RX ORDER — DEXTROAMPHETAMINE SACCHARATE, AMPHETAMINE ASPARTATE, DEXTROAMPHETAMINE SULFATE AND AMPHETAMINE SULFATE 2.5; 2.5; 2.5; 2.5 MG/1; MG/1; MG/1; MG/1
1 TABLET ORAL 2 TIMES DAILY
Qty: 60 TABLET | Refills: 0 | Status: SHIPPED | OUTPATIENT
Start: 2024-08-14 | End: 2024-09-13

## 2024-07-15 NOTE — PATIENT INSTRUCTIONS
"Physically, everything looks pretty good today.      All of the blood work we did in June looks great! Keep up the good work.      For the fever and cough, I do think this could be some sort of viral illness.      Take all of your medications, as prescribed.    Rest  Stay hydrated, drink plenty of fluids   Use OTC nasal saline spray (Breckinridge's, AYR, Arm&Hammer,etc.) to clear nasal drainage and help with nasal congestion  Use an OTC antihistamine (Zyrtec or Claritin) to help dry mucus and post nasal drip  Use OTC Mucinex (plain blue box, no "letters") for sinus/chest congestion  Gargle with warm salt water for throat comfort (10-15 seconds, do NOT swallow!)  Use OTC zinc lozenges or OTC Cepacol lozenges (with benzocaine) for sore throat/cough  Alternate OTC Tylenol and/or Ibuprofen for fever, headache and body aches     If symptoms continue for another couple weeks, please let me know.  At that point, we will redo some blood work and look for other possible causes.    Your testosterone levels have improved a bit over the last couple of years, but they are still on the low side.  I am okay with getting you reconnected to Urology to discuss possible replacement.  New referral placed today.    Continue to eat a healthy diet.  Be careful with portion sizes.  Includes lots of fresh fruits, vegetables, whole grains, lean proteins.  See info below.    Keep hydrated.  Be sure to drink at least 8-10, 8 oz, glasses of water every day.    Stay active.  Try to do some sort of physical activity every day.  Nothing outrageous, just try walking for 10-15 minutes each day.   "

## 2024-07-15 NOTE — PROGRESS NOTES
"    [facility]       [address] [place] [address]      Phone: [phone]      Fax: [phone]    Nito Oscar MD                Office Visit  07/15/2024        Subjective      HPI:  Migue George is a 48 y.o. male presents today in clinic for "Annual Exam, Fever, and Cough  ."     48-year-old gentleman presents today for annual wellness exam.    States that he started feeling funny last Wednesday, five days ago.  Started running fever of 101, 103.  Had some bad headaches.  Mild cough, nonproductive.  Went to urgent care on Friday.  Was negative for COVID, flu.  Did a chest x-ray, that was normal.  They gave him a steroid shot, then five days of prednisone.  Also gave him some promethazine cough medicine.  Has been feeling relatively okay, but still running fevers.  Last night, he let the fever go thinking he could switch it out.  Woke up this morning, was still at 101.  Took some Tylenol at home, temperature normal in clinic right now.    Otherwise, feels okay.  No chest pain, shortness on breath.  Appetite normal.  Bowel movements normal.  No urinary issues.     Has ADHD.  Currently takes Adderall 10 mg, twice a day.  Generally only takes when he has to work.  Dose is working well for him.  Helps with focus, concentration.  Does not affect sleep, appetite.      Also has issues with stress, anxiety.  In the past, took Zoloft, Celexa.  Previous psychologist changed jobs.  Now, following with a psychiatric NP over at [facility].  They stopped the previous meds, started him Trintellix.  Seems to be doing okay with that, so far.  Supplements with trazodone at bedtime for sleep.    Has hypothyroidism.  Currently takes Synthroid 200 mcg daily.  No issues with this medication.      Also has elevated cholesterol/triglycerides levels.  Takes Lipitor 20 mg daily, along with Vascepa.  Tolerating these fine.    PMH: Diverticulitis, prediabetes, HLD, " Hashimoto's/thyroid use, ADHD, anx/dep.  PSH: Colonoscopy June/2020, repeat in 3 years.  Scrotal vein surgery in 1995.  FMH: Dad-unknown.  Mom passed away from pancreatic cancer in her 50s, hypertension, thyroid issues, alcohol abuse.  Allergies: NKDA  Soc: Works at an [employer], , has a son     T: meagan, quit 10+ yrs ago     A: seldom     D: denies     Psych: Brenden Said at [facility]  GI: Dr. Burciaga    Colon:  03/17/2023.  Repeat five years (2028)            The following were updated and reviewed by myself in the chart: medications, past medical history, past surgical history, family history, social history, and allergies.     Medications:  Current Outpatient Medications on File Prior to Visit   Medication Sig Dispense Refill    clindamycin (CLINDACIN ETZ) 1 % Swab Apply topically 2 (two) times daily. PRN acne flares of posterior neck. 60 each 1    ketoconazole (NIZORAL) 2 % cream Apply topically 2 (two) times daily. PRN dryness / flaking. Non-steroid. 60 g 1    ketoconazole (NIZORAL) 2 % shampoo Apply topically every 7 days. Shampoo scalp 1-2 times weekly. Lather x 5 minutes, then rinse well. 120 mL 11    pramoxine-hydrocortisone (PROCTOCREAM-HC) 1-1 % rectal cream Place rectally 3 (three) times daily. 3 each 2    senna-docusate 8.6-50 mg (SENNA WITH DOCUSATE SODIUM) 8.6-50 mg per tablet Take 1 tablet by mouth 2 (two) times a day. Stop taking if having diarrhea 60 tablet 0    triamcinolone acetonide 0.025% (KENALOG) 0.025 % cream Apply topically 2 (two) times daily. PRN eczema flares of face. Mild steroid. 15 g 0    TRINTELLIX 10 mg Tab Take 1 tablet by mouth.      valACYclovir (VALTREX) 1000 MG tablet Take 2 tablets by mouth twice daily 30 tablet 1    [DISCONTINUED] atorvastatin (LIPITOR) 20 MG tablet Take 1 tablet (20 mg total) by mouth once daily. 90 tablet 3    [DISCONTINUED] icosapent ethyL (VASCEPA) 1 gram Cap Take 2 capsules (2 g total) by mouth 2 (two) times daily. 360 capsule 3     [DISCONTINUED] levothyroxine (SYNTHROID) 200 MCG tablet TAKE 1 TABLET BEFORE BREAKFAST 90 tablet 1    [DISCONTINUED] tadalafiL (CIALIS) 5 MG tablet Take 1 tablet (5 mg total) by mouth daily as needed for Erectile Dysfunction. 90 tablet 3    [DISCONTINUED] traZODone (DESYREL) 50 MG tablet Take 2 tablets (100 mg total) by mouth nightly as needed for Insomnia. 180 tablet 3    [DISCONTINUED] citalopram (CELEXA) 20 MG tablet Take 1 tablet (20 mg total) by mouth once daily. (Patient not taking: Reported on 5/1/2024) 90 tablet 3    [DISCONTINUED] cyclobenzaprine (FLEXERIL) 10 MG tablet Take 1 tablet (10 mg total) by mouth nightly as needed for Muscle spasms. (Patient not taking: Reported on 5/1/2024) 30 tablet 0    [DISCONTINUED] oxyCODONE (ROXICODONE) 5 MG immediate release tablet Take 1 tablet (5 mg total) by mouth every 6 (six) hours as needed for Pain. 25 tablet 0    [DISCONTINUED] sertraline (ZOLOFT) 100 MG tablet Take 1 tablet (100 mg total) by mouth once daily. 90 tablet 3     No current facility-administered medications on file prior to visit.        PMHx:  Past Medical History:   Diagnosis Date    Anxiety     Depression     Diverticulitis     Hyperlipidemia       Patient Active Problem List    Diagnosis Date Noted    Diarrhea of presumed infectious origin 01/18/2023    Hemorrhoids 01/18/2023    Diverticulosis 01/18/2023    History of colon polyps 01/18/2023    Blood in stool 01/18/2023    Hypothyroid 03/27/2015    Hyperlipidemia         PSHx:  Past Surgical History:   Procedure Laterality Date    COLONOSCOPY N/A 3/17/2023    Procedure: COLONOSCOPY;  Surgeon: Gregory Serrato MD;  Location: [facility];  Service: Endoscopy;  Laterality: N/A;    EXCISIONAL HEMORRHOIDECTOMY N/A 5/7/2024    Procedure: HEMORRHOIDECTOMY;  Surgeon: Gabriel Regalado MD;  Location: [facility];  Service: General;  Laterality: N/A;    INJECTION OF ANESTHETIC AGENT AROUND PUDENDAL NERVE N/A 5/7/2024    Procedure: BLOCK, NERVE, PUDENDAL;   "Surgeon: Gabriel Regalado MD;  Location: [facility];  Service: General;  Laterality: N/A;    testicular vein ligation      VASECTOMY          FHx:  Family History   Problem Relation Name Age of Onset    Pancreatic cancer Mother      Diabetes Mother      Diabetes Unknown      Coronary artery disease Maternal Grandfather      Coronary artery disease Maternal Grandmother          Social:  Social History     Socioeconomic History    Marital status:     Number of children: 4   Occupational History    Occupation: [employer]      Employer: [employer]   Tobacco Use    Smoking status: Former     Current packs/day: 0.00     Types: Cigarettes     Quit date: 2004     Years since quittin.9    Smokeless tobacco: Never   Substance and Sexual Activity    Alcohol use: Yes     Alcohol/week: 0.8 standard drinks of alcohol     Types: 1 Standard drinks or equivalent per week    Drug use: No    Sexual activity: Yes        Allergies:  Review of patient's allergies indicates:  No Known Allergies     ROS:  Review of Systems   Constitutional:  Positive for activity change, diaphoresis, fatigue and fever. Negative for appetite change and chills.   HENT:  Negative for congestion, postnasal drip, rhinorrhea, sore throat and trouble swallowing.    Respiratory:  Positive for cough. Negative for shortness of breath.    Cardiovascular:  Negative for chest pain and palpitations.   Gastrointestinal:  Negative for abdominal pain, constipation, diarrhea, nausea and vomiting.   Genitourinary:  Negative for difficulty urinating.   Musculoskeletal:  Negative for arthralgias and myalgias.   Skin:  Negative for color change and rash.   Neurological:  Negative for headaches.   All other systems reviewed and are negative.         Objective      /78   Pulse 80   Temp 98.3 °F (36.8 °C)   Ht 5' 11" (1.803 m)   Wt 113.2 kg (249 lb 9 oz)   BMI 34.81 kg/m²   Ht Readings from Last 3 Encounters:   07/15/24 5' 11" " "(1.803 m)   06/10/24 5' 11" (1.803 m)   05/07/24 5' 11" (1.803 m)     Wt Readings from Last 3 Encounters:   07/15/24 113.2 kg (249 lb 9 oz)   06/10/24 108.5 kg (239 lb 3.2 oz)   05/07/24 106.5 kg (234 lb 12.6 oz)       PHYSICAL EXAM:  Physical Exam  Vitals and nursing note reviewed.   Constitutional:       General: He is not in acute distress.     Appearance: Normal appearance.   HENT:      Head: Normocephalic and atraumatic.      Right Ear: Tympanic membrane, ear canal and external ear normal.      Left Ear: Tympanic membrane, ear canal and external ear normal.      Nose: Nose normal. No congestion or rhinorrhea.      Mouth/Throat:      Mouth: Mucous membranes are moist.      Pharynx: Oropharynx is clear. No oropharyngeal exudate or posterior oropharyngeal erythema.   Eyes:      Extraocular Movements: Extraocular movements intact.      Conjunctiva/sclera: Conjunctivae normal.      Pupils: Pupils are equal, round, and reactive to light.   Cardiovascular:      Rate and Rhythm: Normal rate and regular rhythm.   Pulmonary:      Effort: Pulmonary effort is normal.      Breath sounds: No wheezing, rhonchi or rales.   Musculoskeletal:         General: Normal range of motion.      Cervical back: Normal range of motion.   Lymphadenopathy:      Cervical: No cervical adenopathy.   Skin:     General: Skin is warm and dry.   Neurological:      General: No focal deficit present.      Mental Status: He is alert.              LABS / IMAGING:  Recent Results (from the past 4368 hour(s))   POCT Influenza A/B MOLECULAR    Collection Time: 03/20/24  3:21 PM   Result Value Ref Range    POC Molecular Influenza A Ag Negative Negative, Not Reported    POC Molecular Influenza B Ag Negative Negative, Not Reported     Acceptable Yes    CBC Auto Differential    Collection Time: 04/29/24  8:59 AM   Result Value Ref Range    WBC 5.24 3.90 - 12.70 K/uL    RBC 5.29 4.60 - 6.20 M/uL    Hemoglobin 14.4 14.0 - 18.0 g/dL    Hematocrit " 42.4 40.0 - 54.0 %    MCV 80 (L) 82 - 98 fL    MCH 27.2 27.0 - 31.0 pg    MCHC 34.0 32.0 - 36.0 g/dL    RDW 14.3 11.5 - 14.5 %    Platelets 177 150 - 450 K/uL    MPV 11.3 9.2 - 12.9 fL    Immature Granulocytes 0.4 0.0 - 0.5 %    Gran # (ANC) 3.1 1.8 - 7.7 K/uL    Immature Grans (Abs) 0.02 0.00 - 0.04 K/uL    Lymph # 1.2 1.0 - 4.8 K/uL    Mono # 0.7 0.3 - 1.0 K/uL    Eos # 0.3 0.0 - 0.5 K/uL    Baso # 0.03 0.00 - 0.20 K/uL    nRBC 0 0 /100 WBC    Gran % 58.8 38.0 - 73.0 %    Lymph % 21.9 18.0 - 48.0 %    Mono % 12.6 4.0 - 15.0 %    Eosinophil % 5.7 0.0 - 8.0 %    Basophil % 0.6 0.0 - 1.9 %    Differential Method Automated    COMPREHENSIVE METABOLIC PANEL    Collection Time: 04/29/24  8:59 AM   Result Value Ref Range    Sodium 138 136 - 145 mmol/L    Potassium 4.1 3.5 - 5.1 mmol/L    Chloride 107 95 - 110 mmol/L    CO2 23 23 - 29 mmol/L    Glucose 94 70 - 110 mg/dL    BUN 11 6 - 20 mg/dL    Creatinine 0.9 0.5 - 1.4 mg/dL    Calcium 9.3 8.7 - 10.5 mg/dL    Total Protein 6.8 6.0 - 8.4 g/dL    Albumin 4.2 3.5 - 5.2 g/dL    Total Bilirubin 0.5 0.1 - 1.0 mg/dL    Alkaline Phosphatase 55 55 - 135 U/L    AST 20 10 - 40 U/L    ALT 30 10 - 44 U/L    eGFR >60 >60 mL/min/1.73 m^2    Anion Gap 8 8 - 16 mmol/L   Specimen to Pathology, Surgery General Surgery    Collection Time: 05/07/24  7:42 AM   Result Value Ref Range    Final Pathologic Diagnosis       1. Hemorrhoid, right anterior (excision):  Benign squamous rectal mucosa.  Underlying dilated congested vessels.  Consistent with hemorrhoid.      2. Hemorrhoid, left posterior (excision):  Benign squamous rectal mucosa.  Underlying dilated congested vessels.  Consistent with hemorrhoid.        Gross       1: Surgery ID:  [id number]   Pathology ID:  [id number]  1. Received in formalin labeled &quot;right anterior hemorrhoid&quot; is a 1.5 x 1 x 0.8 cm portion of pink-red, glistening, nodular tissue.  Sectioning reveals a pink-red, rubbery cut surface with multiple dilated, blood-filled  spaces.  The specimen is   submitted entirely in cassette 1A.    EMI--1-A        Grossed by: Jayne Tristan MS, PA([facility])   2: Surgery ID:  [id number]   Pathology ID:  [id number]  2. Received in formalin labeled &quot;right posterior hemorrhoid&quot; is a 2.6 x 1 x 0.8 cm portion of pink-red, glistening, nodular tissue.  Sectioning reveals a pink-red, rubbery cut surface with multiple dilated, blood-filled spaces.  The specimen is   submitted entirely in cassette 2A.    RBH--2-A        Grossed by: Jayne Tristan MS, PA([facility])      Disclaimer       Unless the case is a 'gross only' or additional testing only, the final diagnosis for each specimen is based on a microscopic examination of appropriate tissue sections.   Lipid Panel    Collection Time: 06/19/24  8:30 AM   Result Value Ref Range    Cholesterol 142 120 - 199 mg/dL    Triglycerides 152 (H) 30 - 150 mg/dL    HDL 39 (L) 40 - 75 mg/dL    LDL Cholesterol 72.6 63.0 - 159.0 mg/dL    HDL/Cholesterol Ratio 27.5 20.0 - 50.0 %    Total Cholesterol/HDL Ratio 3.6 2.0 - 5.0    Non-HDL Cholesterol 103 mg/dL   TSH    Collection Time: 06/19/24  8:30 AM   Result Value Ref Range    TSH 1.569 0.400 - 4.000 uIU/mL   Comprehensive Metabolic Panel    Collection Time: 06/19/24  8:30 AM   Result Value Ref Range    Sodium 141 136 - 145 mmol/L    Potassium 4.7 3.5 - 5.1 mmol/L    Chloride 106 95 - 110 mmol/L    CO2 27 23 - 29 mmol/L    Glucose 102 70 - 110 mg/dL    BUN 11 6 - 20 mg/dL    Creatinine 1.0 0.5 - 1.4 mg/dL    Calcium 9.4 8.7 - 10.5 mg/dL    Total Protein 7.1 6.0 - 8.4 g/dL    Albumin 4.1 3.5 - 5.2 g/dL    Total Bilirubin 0.3 0.1 - 1.0 mg/dL    Alkaline Phosphatase 67 55 - 135 U/L    AST 19 10 - 40 U/L    ALT 29 10 - 44 U/L    eGFR >60.0 >60 mL/min/1.73 m^2    Anion Gap 8 8 - 16 mmol/L   CBC Auto Differential    Collection Time: 06/19/24  8:30 AM   Result Value Ref Range    WBC 3.94 3.90 - 12.70 K/uL    RBC 5.54 4.60 - 6.20 M/uL    Hemoglobin 14.8 14.0 - 18.0 g/dL     Hematocrit 45.7 40.0 - 54.0 %    MCV 83 82 - 98 fL    MCH 26.7 (L) 27.0 - 31.0 pg    MCHC 32.4 32.0 - 36.0 g/dL    RDW 14.5 11.5 - 14.5 %    Platelets 203 150 - 450 K/uL    MPV 12.0 9.2 - 12.9 fL    Immature Granulocytes 0.5 0.0 - 0.5 %    Gran # (ANC) 2.4 1.8 - 7.7 K/uL    Immature Grans (Abs) 0.02 0.00 - 0.04 K/uL    Lymph # 0.9 (L) 1.0 - 4.8 K/uL    Mono # 0.4 0.3 - 1.0 K/uL    Eos # 0.2 0.0 - 0.5 K/uL    Baso # 0.02 0.00 - 0.20 K/uL    nRBC 0 0 /100 WBC    Gran % 60.9 38.0 - 73.0 %    Lymph % 23.4 18.0 - 48.0 %    Mono % 9.4 4.0 - 15.0 %    Eosinophil % 5.3 0.0 - 8.0 %    Basophil % 0.5 0.0 - 1.9 %    Differential Method Automated    Hemoglobin A1C    Collection Time: 06/19/24  8:30 AM   Result Value Ref Range    Hemoglobin A1C 5.7 (H) 4.0 - 5.6 %    Estimated Avg Glucose 117 68 - 131 mg/dL   PSA, Screening    Collection Time: 06/19/24  8:30 AM   Result Value Ref Range    PSA, Screen 0.53 0.00 - 4.00 ng/mL   Testosterone    Collection Time: 06/19/24  8:30 AM   Result Value Ref Range    Testosterone, Total 404 304 - 1227 ng/dL   Sedimentation rate    Collection Time: 06/19/24  8:30 AM   Result Value Ref Range    Sed Rate <2 0 - 23 mm/Hr   C-reactive protein    Collection Time: 06/19/24  8:30 AM   Result Value Ref Range    CRP 2.0 0.0 - 8.2 mg/L   Rheumatoid factor    Collection Time: 06/19/24  8:30 AM   Result Value Ref Range    Rheumatoid Factor <13.0 0.0 - 15.0 IU/mL   Cyclic citrul peptide antibody, IgG    Collection Time: 06/19/24  8:30 AM   Result Value Ref Range    CCP Antibodies <0.5 <5.0 U/mL         Assessment    1. Annual physical exam    2. Fever, unspecified fever cause    3. Cough, unspecified type    4. Mixed hyperlipidemia    5. Hypertriglyceridemia    6. Acquired hypothyroidism    7. Erectile dysfunction, unspecified erectile dysfunction type    8. Shift work sleep disorder    9. Attention deficit hyperactivity disorder (ADHD), unspecified ADHD type    10. Low testosterone          Plan     Migue was seen today for annual exam, fever and cough.    Diagnoses and all orders for this visit:    Annual physical exam    Fever, unspecified fever cause    Cough, unspecified type    Mixed hyperlipidemia  -     atorvastatin (LIPITOR) 20 MG tablet; Take 1 tablet (20 mg total) by mouth once daily.    Hypertriglyceridemia  -     icosapent ethyL (VASCEPA) 1 gram Cap; Take 2 capsules (2 g total) by mouth 2 (two) times daily.    Acquired hypothyroidism  -     levothyroxine (SYNTHROID) 200 MCG tablet; Take 1 tablet (200 mcg total) by mouth before breakfast.    Erectile dysfunction, unspecified erectile dysfunction type  -     tadalafiL (CIALIS) 5 MG tablet; Take 1 tablet (5 mg total) by mouth daily as needed for Erectile Dysfunction.    Shift work sleep disorder  -     traZODone (DESYREL) 50 MG tablet; Take 2 tablets (100 mg total) by mouth nightly as needed for Insomnia.    Attention deficit hyperactivity disorder (ADHD), unspecified ADHD type  -     dextroamphetamine-amphetamine (ADDERALL) 10 mg Tab; Take 1 tablet (10 mg total) by mouth 2 (two) times a day.  -     dextroamphetamine-amphetamine (ADDERALL) 10 mg Tab; Take 1 tablet (10 mg total) by mouth 2 (two) times a day.  -     dextroamphetamine-amphetamine (ADDERALL) 10 mg Tab; Take 1 tablet (10 mg total) by mouth 2 (two) times a day.    Low testosterone  -     Ambulatory referral/consult to Urology; Future      Physically, everything looks pretty good today.      Recent blood work looks fine.  Can plan to repeat next year.      Med refills, as above.      Testosterone still a bit low.  New referral to Urology to discuss options for replacement.      Fever, cough has been going on for about five days now.  Likely viral.  Physically looks okay.  If it persists more than two weeks, we can do repeat labs to investigate.    FOLLOW-UP:  Follow up in about 3 months (around 10/15/2024) for med refill.    I spent a total of 30 minutes face to face and non-face to face  on the date of this visit.This includes time preparing to see the patient (eg, review of tests, notes), obtaining and/or reviewing additional history from an independent historian and/or outside medical records, documenting clinical information in the electronic health record, independently interpreting results and/or communicating results to the patient/family/caregiver, or care coordinator.  Visit today included increased complexity associated with the care of the episodic problem addressed and managing the longitudinal care of the patient due to the serious and/or complex managed problem(s).    Signed by:  Nito Oscar MD

## 2024-07-25 DIAGNOSIS — E78.2 MIXED HYPERLIPIDEMIA: ICD-10-CM

## 2024-07-26 RX ORDER — ATORVASTATIN CALCIUM 20 MG/1
20 TABLET, FILM COATED ORAL
Qty: 90 TABLET | Refills: 3 | Status: SHIPPED | OUTPATIENT
Start: 2024-07-26

## 2024-07-26 NOTE — TELEPHONE ENCOUNTER
No care due was identified.  [facility] Embedded Care Due Messages. Reference number: [id number].   7/25/2024 11:58:43 PM CDT

## 2024-07-26 NOTE — TELEPHONE ENCOUNTER
Refill Decision Note   Migue George  is requesting a refill authorization.    Brief Assessment and Rationale for Refill:   Approve       Medication Therapy Plan:         Comments:     Note composed:12:37 PM 07/26/2024

## 2024-08-14 ENCOUNTER — OFFICE VISIT (OUTPATIENT)
Dept: UROLOGY | Facility: CLINIC | Age: 49
End: 2024-08-14
Payer: COMMERCIAL

## 2024-08-14 ENCOUNTER — LAB VISIT (OUTPATIENT)
Dept: LAB | Facility: HOSPITAL | Age: 49
End: 2024-08-14
Attending: UROLOGY
Payer: COMMERCIAL

## 2024-08-14 VITALS
BODY MASS INDEX: 33.64 KG/M2 | WEIGHT: 240.31 LBS | HEART RATE: 66 BPM | HEIGHT: 71 IN | DIASTOLIC BLOOD PRESSURE: 85 MMHG | RESPIRATION RATE: 18 BRPM | SYSTOLIC BLOOD PRESSURE: 130 MMHG

## 2024-08-14 DIAGNOSIS — R79.89 LOW TESTOSTERONE: Primary | ICD-10-CM

## 2024-08-14 DIAGNOSIS — N52.8 OTHER MALE ERECTILE DYSFUNCTION: ICD-10-CM

## 2024-08-14 DIAGNOSIS — R79.89 LOW TESTOSTERONE: ICD-10-CM

## 2024-08-14 LAB
BILIRUB UR QL STRIP: NEGATIVE
ESTRADIOL SERPL-MCNC: 13 PG/ML (ref 11–44)
GLUCOSE UR QL STRIP: NEGATIVE
KETONES UR QL STRIP: NEGATIVE
LEUKOCYTE ESTERASE UR QL STRIP: NEGATIVE
PH, POC UA: 5
POC BLOOD, URINE: NEGATIVE
POC NITRATES, URINE: NEGATIVE
PROT UR QL STRIP: NEGATIVE
SP GR UR STRIP: 1.02 (ref 1–1.03)
UROBILINOGEN UR STRIP-ACNC: 0.2 (ref 0.3–2.2)

## 2024-08-14 PROCEDURE — 3079F DIAST BP 80-89 MM HG: CPT | Mod: CPTII,S$GLB,, | Performed by: UROLOGY

## 2024-08-14 PROCEDURE — 1159F MED LIST DOCD IN RCRD: CPT | Mod: CPTII,S$GLB,, | Performed by: UROLOGY

## 2024-08-14 PROCEDURE — 3008F BODY MASS INDEX DOCD: CPT | Mod: CPTII,S$GLB,, | Performed by: UROLOGY

## 2024-08-14 PROCEDURE — 84403 ASSAY OF TOTAL TESTOSTERONE: CPT | Performed by: UROLOGY

## 2024-08-14 PROCEDURE — 84270 ASSAY OF SEX HORMONE GLOBUL: CPT | Performed by: UROLOGY

## 2024-08-14 PROCEDURE — 82040 ASSAY OF SERUM ALBUMIN: CPT | Performed by: UROLOGY

## 2024-08-14 PROCEDURE — 82670 ASSAY OF TOTAL ESTRADIOL: CPT | Performed by: UROLOGY

## 2024-08-14 PROCEDURE — 81003 URINALYSIS AUTO W/O SCOPE: CPT | Mod: QW,S$GLB,, | Performed by: UROLOGY

## 2024-08-14 PROCEDURE — 99999 PR PBB SHADOW E&M-EST. PATIENT-LVL IV: CPT | Mod: PBBFAC,,, | Performed by: UROLOGY

## 2024-08-14 PROCEDURE — 3044F HG A1C LEVEL LT 7.0%: CPT | Mod: CPTII,S$GLB,, | Performed by: UROLOGY

## 2024-08-14 PROCEDURE — 36415 COLL VENOUS BLD VENIPUNCTURE: CPT | Mod: PN | Performed by: UROLOGY

## 2024-08-14 PROCEDURE — 3075F SYST BP GE 130 - 139MM HG: CPT | Mod: CPTII,S$GLB,, | Performed by: UROLOGY

## 2024-08-14 PROCEDURE — 99204 OFFICE O/P NEW MOD 45 MIN: CPT | Mod: S$GLB,,, | Performed by: UROLOGY

## 2024-08-14 NOTE — PROGRESS NOTES
Chief Complaint   Patient presents with    Low Testosterone       History of Present Illness:   Migue George is a 48 y.o. male here for evaluation of Low Testosterone    8/14/24-47yo male, here for evaluation of low T. Pt reports that he has symptoms of low T like fatigue but he also works shift work. Pt does have low libido, but takes trintellix (x 2 months) and celexa prior to that. Pt takes daily cialis, which helps with any ED issues. Pt denies stranguria. He does have some frequency.   Pt reports that he was on TRT with his PCP several years ago, which he ultimately elected to stop.         Review of Systems   Constitutional:  Negative for chills and fever.   HENT:  Negative for nosebleeds.    Respiratory:  Negative for shortness of breath.    Cardiovascular:  Negative for chest pain.   Gastrointestinal:  Negative for abdominal pain.   Genitourinary:  Negative for hematuria.   Musculoskeletal:  Negative for gait problem.   Neurological:  Negative for weakness and numbness.   Hematological:  Does not bruise/bleed easily.   All other systems reviewed and are negative.        Past Medical History:   Diagnosis Date    Anxiety     Depression     Diverticulitis     Hyperlipidemia        Past Surgical History:   Procedure Laterality Date    COLONOSCOPY N/A 3/17/2023    Procedure: COLONOSCOPY;  Surgeon: Gregory Serrato MD;  Location: [facility];  Service: Endoscopy;  Laterality: N/A;    EXCISIONAL HEMORRHOIDECTOMY N/A 5/7/2024    Procedure: HEMORRHOIDECTOMY;  Surgeon: Gabriel Regalado MD;  Location: [facility] OR;  Service: General;  Laterality: N/A;    INJECTION OF ANESTHETIC AGENT AROUND PUDENDAL NERVE N/A 5/7/2024    Procedure: BLOCK, NERVE, PUDENDAL;  Surgeon: Gabriel Regalado MD;  Location: [facility] OR;  Service: General;  Laterality: N/A;    testicular vein ligation      VASECTOMY         Family History   Problem Relation Name Age of Onset    Pancreatic cancer Mother      Diabetes Mother      Diabetes  Unknown      Coronary artery disease Maternal Grandfather      Coronary artery disease Maternal Grandmother         Social History     Tobacco Use    Smoking status: Former     Current packs/day: 0.00     Types: Cigarettes     Quit date: 2004     Years since quittin.0    Smokeless tobacco: Never   Substance Use Topics    Alcohol use: Yes     Alcohol/week: 0.8 standard drinks of alcohol     Types: 1 Standard drinks or equivalent per week    Drug use: No       Current Outpatient Medications   Medication Sig Dispense Refill    atorvastatin (LIPITOR) 20 MG tablet TAKE 1 TABLET DAILY 90 tablet 3    dextroamphetamine-amphetamine (ADDERALL) 10 mg Tab Take 1 tablet (10 mg total) by mouth 2 (two) times a day. 60 tablet 0    dextroamphetamine-amphetamine (ADDERALL) 10 mg Tab Take 1 tablet (10 mg total) by mouth 2 (two) times a day. 60 tablet 0    [START ON 2024] dextroamphetamine-amphetamine (ADDERALL) 10 mg Tab Take 1 tablet (10 mg total) by mouth 2 (two) times a day. 60 tablet 0    icosapent ethyL (VASCEPA) 1 gram Cap Take 2 capsules (2 g total) by mouth 2 (two) times daily. 360 capsule 3    ketoconazole (NIZORAL) 2 % cream Apply topically 2 (two) times daily. PRN dryness / flaking. Non-steroid. 60 g 1    ketoconazole (NIZORAL) 2 % shampoo Apply topically every 7 days. Shampoo scalp 1-2 times weekly. Lather x 5 minutes, then rinse well. 120 mL 11    levothyroxine (SYNTHROID) 200 MCG tablet Take 1 tablet (200 mcg total) by mouth before breakfast. 90 tablet 3    tadalafiL (CIALIS) 5 MG tablet Take 1 tablet (5 mg total) by mouth daily as needed for Erectile Dysfunction. 90 tablet 3    traZODone (DESYREL) 50 MG tablet Take 2 tablets (100 mg total) by mouth nightly as needed for Insomnia. 180 tablet 3    triamcinolone acetonide 0.025% (KENALOG) 0.025 % cream Apply topically 2 (two) times daily. PRN eczema flares of face. Mild steroid. 15 g 0    TRINTELLIX 10 mg Tab Take 1 tablet by mouth.      valACYclovir  (VALTREX) 1000 MG tablet Take 2 tablets by mouth twice daily 30 tablet 1     No current facility-administered medications for this visit.       Review of patient's allergies indicates:  No Known Allergies    Physical Exam  Vitals:    08/14/24 0800   BP: 130/85   Pulse: 66   Resp: 18       General: Well-developed, well-nourished in no acute distress  HEENT: Normocephalic, atraumatic, Extraocular movements intact  Neck: supple, trachea midline, no cervical or supraclavicular lymphadenopathy  Respirations: even and unlabored  Back: midline spine, no CVA tenderness  Abdomen: soft, Non-tender, non-distended, no organomegaly or palpable masses, no rebound or guarding  : circumcised male phallus without lesions, orthotopic urethral meatus, no inguinal hernia, no inguinal lymphadenopathy, no scrotal lesions, testicles descended bilaterally without mass or tenderness  Extremities: atraumatic, moves all equally, no clubbing, cyanosis or edema  Psych: normal affect  Skin: warm and dry, no lesions  Neuro: Alert and oriented, Cranial nerves II-XII grossly intact    Urinalysis  Negative for blood, LE, nit    Lab Results   Component Value Date    PSA 0.53 06/19/2024    PSA 0.20 06/23/2023    PSA 0.29 10/18/2022       Testosterone, Total   Date/Time Value Ref Range Status   06/19/2024 08:30  304 - 1227 ng/dL Final       Assessment:   1. Low testosterone  POCT Urinalysis, Dipstick, Automated, W/O Scope    TESTOSTERONE PANEL    Estradiol      2. Other male erectile dysfunction            Plan:  Low testosterone  -     POCT Urinalysis, Dipstick, Automated, W/O Scope  -     TESTOSTERONE PANEL; Future; Expected date: 08/14/2024  -     Estradiol; Future; Expected date: 08/14/2024    Other male erectile dysfunction    I had a detailed discussion with the patient, reviewing the diagnosis and management of testosterone deficiency. I explained that AUA guidelines recommend obtaining two early morning testosterone values less than 300  with associated symptoms. We also discussed the utility of the full testosterone panel. Reviewed that any exogenous T usage will result in drastically impaired fertility, and that this effect may be permanent. I reviewed that if the patient desires maintaining fertility while addressing his low T, exogenous T would not be an appropriate choice. Also explained that addressing his low T would require routine lab testing to ensure that his blood counts remain stable.  We discussed the data as it relates to cardiovascular health and risk. We also discussed that while there is no evidence linking testosterone therapy to the development of prostate cancer, there could be risk in an individual with prostate cancer, as it relates to prostate cancer growth.   He would like to check a full testosterone panel, but isn't sure if he would like TRT.   Continue daily cialis 5mg.     Follow up if symptoms worsen or fail to improve.

## 2024-08-23 LAB — ALBUMIN SERPL-MCNC: 4.3 G/DL (ref 3.6–5.1)

## 2024-09-30 ENCOUNTER — PATIENT MESSAGE (OUTPATIENT)
Dept: PRIMARY CARE CLINIC | Facility: CLINIC | Age: 49
End: 2024-09-30
Payer: COMMERCIAL

## 2024-10-02 ENCOUNTER — TELEPHONE (OUTPATIENT)
Dept: PRIMARY CARE CLINIC | Facility: CLINIC | Age: 49
End: 2024-10-02
Payer: COMMERCIAL

## 2024-10-02 NOTE — TELEPHONE ENCOUNTER
----- Message from Allance sent at 10/2/2024  2:07 PM CDT -----  Contact: janell  Type:  Patient Returning Call    Who Called:janell  Who Left Message for Patient:nurse/KK  Does the patient know what this is regarding?:misseed call  Would the patient rather a call back or a response via [facility]? call  Best Call Back Number:[phone]   Additional Information:

## 2024-10-07 ENCOUNTER — PATIENT MESSAGE (OUTPATIENT)
Dept: PRIMARY CARE CLINIC | Facility: CLINIC | Age: 49
End: 2024-10-07

## 2024-10-07 ENCOUNTER — OFFICE VISIT (OUTPATIENT)
Dept: PRIMARY CARE CLINIC | Facility: CLINIC | Age: 49
End: 2024-10-07
Payer: COMMERCIAL

## 2024-10-07 DIAGNOSIS — E66.811 CLASS 1 OBESITY WITH SERIOUS COMORBIDITY AND BODY MASS INDEX (BMI) OF 34.0 TO 34.9 IN ADULT, UNSPECIFIED OBESITY TYPE: Primary | ICD-10-CM

## 2024-10-07 PROCEDURE — G2211 COMPLEX E/M VISIT ADD ON: HCPCS | Mod: 95,,, | Performed by: FAMILY MEDICINE

## 2024-10-07 PROCEDURE — 3044F HG A1C LEVEL LT 7.0%: CPT | Mod: CPTII,95,, | Performed by: FAMILY MEDICINE

## 2024-10-07 PROCEDURE — 99214 OFFICE O/P EST MOD 30 MIN: CPT | Mod: 95,,, | Performed by: FAMILY MEDICINE

## 2024-10-07 RX ORDER — TIRZEPATIDE 2.5 MG/.5ML
2.5 INJECTION, SOLUTION SUBCUTANEOUS
Qty: 2 ML | Refills: 12 | Status: SHIPPED | OUTPATIENT
Start: 2024-10-07

## 2024-10-07 NOTE — PROGRESS NOTES
"    [facility]       [address] [place] [address]      Phone: [phone]      Fax: [phone]    Nito Oscar MD                Video Visit  10/07/2024        Subjective      HPI:  Migue George is a 49 y.o. male presents today via video for "No chief complaint on file.  ."     Forty-seven 49-year-old gentleman presents today via video visit to discuss weight loss medication.    States that he has been watching his diet for the past 6+ months.  Healthier choices.  Watching portion sizes.  Trying to stay active, exercising as much as possible.  Still can not lose weight.  Has a friend who is taking bupropion-naltrexone.  Wonders if that could be an option for him?  Also interested in other alternatives.    Physically, feels okay.  No chest pain, shortness on breath.  No fever, chills, body aches.  No coughing, sneezing, URI type symptoms.  Appetite normal.  Bowel movements normal.  No urinary issues.    Has ADHD.  Currently takes Adderall 10 mg, twice a day.  Generally only takes when he has to work.  Dose is working well for him.  Helps with focus, concentration.  Does not affect sleep, appetite.      Also has issues with stress, anxiety.  In the past, took Zoloft, Celexa.  Previous psychologist changed jobs.  Now, following with a psychiatric NP over at [facility].  They stopped the previous meds, started him Trintellix.  Doing well with it.  Supplements with trazodone at bedtime for sleep.    Has hypothyroidism.  Currently takes Synthroid 200 mcg daily.  No issues with this medication.      Also has elevated cholesterol/triglycerides levels.  Takes Lipitor 20 mg daily, along with Vascepa.  Tolerating these fine.    PMH: Diverticulitis, prediabetes, HLD, Hashimoto's/thyroid use, ADHD, anx/dep.  PSH: Colonoscopy June/2020, repeat in 3 years.  Scrotal vein surgery in 1995.  FMH: Dad-unknown.  Mom passed away from pancreatic cancer in her 50s, " hypertension, thyroid issues, alcohol abuse.  Allergies: NKDA  Soc: Works at an [employer], , has a son     T: meagan, quit 10+ yrs ago     A: seldom     D: denies     Psych: Brenden Said at [facility]  GI: Dr. Burciaga    Colon:  03/17/2023.  Repeat five years (2028)            The following were updated and reviewed by myself in the chart: medications, past medical history, past surgical history, family history, social history, and allergies.     Medications:  Current Outpatient Medications on File Prior to Visit   Medication Sig Dispense Refill    atorvastatin (LIPITOR) 20 MG tablet TAKE 1 TABLET DAILY 90 tablet 3    dextroamphetamine-amphetamine (ADDERALL) 10 mg Tab Take 1 tablet (10 mg total) by mouth 2 (two) times a day. 60 tablet 0    icosapent ethyL (VASCEPA) 1 gram Cap Take 2 capsules (2 g total) by mouth 2 (two) times daily. 360 capsule 3    ketoconazole (NIZORAL) 2 % cream Apply topically 2 (two) times daily. PRN dryness / flaking. Non-steroid. 60 g 1    ketoconazole (NIZORAL) 2 % shampoo Apply topically every 7 days. Shampoo scalp 1-2 times weekly. Lather x 5 minutes, then rinse well. 120 mL 11    levothyroxine (SYNTHROID) 200 MCG tablet Take 1 tablet (200 mcg total) by mouth before breakfast. 90 tablet 3    tadalafiL (CIALIS) 5 MG tablet Take 1 tablet (5 mg total) by mouth daily as needed for Erectile Dysfunction. 90 tablet 3    traZODone (DESYREL) 50 MG tablet Take 2 tablets (100 mg total) by mouth nightly as needed for Insomnia. 180 tablet 3    triamcinolone acetonide 0.025% (KENALOG) 0.025 % cream Apply topically 2 (two) times daily. PRN eczema flares of face. Mild steroid. 15 g 0    TRINTELLIX 10 mg Tab Take 1 tablet by mouth.      valACYclovir (VALTREX) 1000 MG tablet Take 2 tablets by mouth twice daily 30 tablet 1     No current facility-administered medications on file prior to visit.        PMHx:  Past Medical History:   Diagnosis Date    Anxiety     Depression     Diverticulitis      Hyperlipidemia       Patient Active Problem List    Diagnosis Date Noted    Diarrhea of presumed infectious origin 2023    Hemorrhoids 2023    Diverticulosis 2023    History of colon polyps 2023    Blood in stool 2023    Hypothyroid 2015    Hyperlipidemia         PSHx:  Past Surgical History:   Procedure Laterality Date    COLONOSCOPY N/A 3/17/2023    Procedure: COLONOSCOPY;  Surgeon: Gregory Serrato MD;  Location: [facility];  Service: Endoscopy;  Laterality: N/A;    EXCISIONAL HEMORRHOIDECTOMY N/A 2024    Procedure: HEMORRHOIDECTOMY;  Surgeon: Gabriel Regalado MD;  Location: [facility] [place] [facility];  Service: General;  Laterality: N/A;    INJECTION OF ANESTHETIC AGENT AROUND PUDENDAL NERVE N/A 2024    Procedure: BLOCK, NERVE, PUDENDAL;  Surgeon: Gabriel Regalado MD;  Location: [facility] [place] [facility];  Service: General;  Laterality: N/A;    testicular vein ligation      VASECTOMY          FHx:  Family History   Problem Relation Name Age of Onset    Pancreatic cancer Mother      Diabetes Mother      Diabetes Unknown      Coronary artery disease Maternal Grandfather      Coronary artery disease Maternal Grandmother          Social:  Social History     Socioeconomic History    Marital status:     Number of children: 4   Occupational History    Occupation: [employer]      Employer: [employer]   Tobacco Use    Smoking status: Former     Current packs/day: 0.00     Types: Cigarettes     Quit date: 2004     Years since quittin.2    Smokeless tobacco: Never   Substance and Sexual Activity    Alcohol use: Yes     Alcohol/week: 0.8 standard drinks of alcohol     Types: 1 Standard drinks or equivalent per week    Drug use: No    Sexual activity: Yes        Allergies:  Review of patient's allergies indicates:  No Known Allergies     ROS:  Review of Systems   Constitutional:  Negative for activity change and unexpected weight change.   HENT:  Negative for hearing  "loss, rhinorrhea and trouble swallowing.    Eyes:  Negative for discharge and visual disturbance.   Respiratory:  Negative for chest tightness and wheezing.    Cardiovascular:  Negative for chest pain and palpitations.   Gastrointestinal:  Negative for blood in stool, constipation, diarrhea and vomiting.   Endocrine: Negative for polydipsia and polyuria.   Genitourinary:  Negative for difficulty urinating, hematuria and urgency.   Musculoskeletal:  Negative for arthralgias, joint swelling and neck pain.   Neurological:  Negative for weakness and headaches.   Psychiatric/Behavioral:  Negative for confusion and dysphoric mood.           Objective      There were no vitals taken for this visit.  Ht Readings from Last 3 Encounters:   08/14/24 5' 11" (1.803 m)   07/15/24 5' 11" (1.803 m)   06/10/24 5' 11" (1.803 m)     Wt Readings from Last 3 Encounters:   08/14/24 109 kg (240 lb 4.8 oz)   07/15/24 113.2 kg (249 lb 9 oz)   06/10/24 108.5 kg (239 lb 3.2 oz)       PHYSICAL EXAM:  Physical Exam  Constitutional:       General: He is not in acute distress.     Appearance: Normal appearance. He is not ill-appearing.   HENT:      Head: Normocephalic and atraumatic.   Pulmonary:      Effort: Pulmonary effort is normal. No respiratory distress.   Neurological:      Mental Status: He is alert.   Psychiatric:         Mood and Affect: Mood normal.         Behavior: Behavior normal.         Thought Content: Thought content normal.              LABS / IMAGING:  Recent Results (from the past 26 weeks)   CBC Auto Differential    Collection Time: 04/29/24  8:59 AM   Result Value Ref Range    WBC 5.24 3.90 - 12.70 K/uL    RBC 5.29 4.60 - 6.20 M/uL    Hemoglobin 14.4 14.0 - 18.0 g/dL    Hematocrit 42.4 40.0 - 54.0 %    MCV 80 (L) 82 - 98 fL    MCH 27.2 27.0 - 31.0 pg    MCHC 34.0 32.0 - 36.0 g/dL    RDW 14.3 11.5 - 14.5 %    Platelets 177 150 - 450 K/uL    MPV 11.3 9.2 - 12.9 fL    Immature Granulocytes 0.4 0.0 - 0.5 %    Gran # (ANC) 3.1 " 1.8 - 7.7 K/uL    Immature Grans (Abs) 0.02 0.00 - 0.04 K/uL    Lymph # 1.2 1.0 - 4.8 K/uL    Mono # 0.7 0.3 - 1.0 K/uL    Eos # 0.3 0.0 - 0.5 K/uL    Baso # 0.03 0.00 - 0.20 K/uL    nRBC 0 0 /100 WBC    Gran % 58.8 38.0 - 73.0 %    Lymph % 21.9 18.0 - 48.0 %    Mono % 12.6 4.0 - 15.0 %    Eosinophil % 5.7 0.0 - 8.0 %    Basophil % 0.6 0.0 - 1.9 %    Differential Method Automated    COMPREHENSIVE METABOLIC PANEL    Collection Time: 04/29/24  8:59 AM   Result Value Ref Range    Sodium 138 136 - 145 mmol/L    Potassium 4.1 3.5 - 5.1 mmol/L    Chloride 107 95 - 110 mmol/L    CO2 23 23 - 29 mmol/L    Glucose 94 70 - 110 mg/dL    BUN 11 6 - 20 mg/dL    Creatinine 0.9 0.5 - 1.4 mg/dL    Calcium 9.3 8.7 - 10.5 mg/dL    Total Protein 6.8 6.0 - 8.4 g/dL    Albumin 4.2 3.5 - 5.2 g/dL    Total Bilirubin 0.5 0.1 - 1.0 mg/dL    Alkaline Phosphatase 55 55 - 135 U/L    AST 20 10 - 40 U/L    ALT 30 10 - 44 U/L    eGFR >60 >60 mL/min/1.73 m^2    Anion Gap 8 8 - 16 mmol/L   Specimen to Pathology, Surgery General Surgery    Collection Time: 05/07/24  7:42 AM   Result Value Ref Range    Final Pathologic Diagnosis       1. Hemorrhoid, right anterior (excision):  Benign squamous rectal mucosa.  Underlying dilated congested vessels.  Consistent with hemorrhoid.      2. Hemorrhoid, left posterior (excision):  Benign squamous rectal mucosa.  Underlying dilated congested vessels.  Consistent with hemorrhoid.        Gross       1: Surgery ID:  [id number]   Pathology ID:  [id number]  1. Received in formalin labeled &quot;right anterior hemorrhoid&quot; is a 1.5 x 1 x 0.8 cm portion of pink-red, glistening, nodular tissue.  Sectioning reveals a pink-red, rubbery cut surface with multiple dilated, blood-filled spaces.  The specimen is   submitted entirely in cassette 1A.    IMR--1-A        Grossed by: Jayne Tristan MS, PA([facility])   2: Surgery ID:  [id number]   Pathology ID:  [id number]  2. Received in formalin labeled &quot;right posterior  hemorrhoid&quot; is a 2.6 x 1 x 0.8 cm portion of pink-red, glistening, nodular tissue.  Sectioning reveals a pink-red, rubbery cut surface with multiple dilated, blood-filled spaces.  The specimen is   submitted entirely in cassette 2A.    ECW--2-A        Grossed by: Jayne Tristan MS, PA([facility])      Disclaimer       Unless the case is a 'gross only' or additional testing only, the final diagnosis for each specimen is based on a microscopic examination of appropriate tissue sections.   Lipid Panel    Collection Time: 06/19/24  8:30 AM   Result Value Ref Range    Cholesterol 142 120 - 199 mg/dL    Triglycerides 152 (H) 30 - 150 mg/dL    HDL 39 (L) 40 - 75 mg/dL    LDL Cholesterol 72.6 63.0 - 159.0 mg/dL    HDL/Cholesterol Ratio 27.5 20.0 - 50.0 %    Total Cholesterol/HDL Ratio 3.6 2.0 - 5.0    Non-HDL Cholesterol 103 mg/dL   TSH    Collection Time: 06/19/24  8:30 AM   Result Value Ref Range    TSH 1.569 0.400 - 4.000 uIU/mL   Comprehensive Metabolic Panel    Collection Time: 06/19/24  8:30 AM   Result Value Ref Range    Sodium 141 136 - 145 mmol/L    Potassium 4.7 3.5 - 5.1 mmol/L    Chloride 106 95 - 110 mmol/L    CO2 27 23 - 29 mmol/L    Glucose 102 70 - 110 mg/dL    BUN 11 6 - 20 mg/dL    Creatinine 1.0 0.5 - 1.4 mg/dL    Calcium 9.4 8.7 - 10.5 mg/dL    Total Protein 7.1 6.0 - 8.4 g/dL    Albumin 4.1 3.5 - 5.2 g/dL    Total Bilirubin 0.3 0.1 - 1.0 mg/dL    Alkaline Phosphatase 67 55 - 135 U/L    AST 19 10 - 40 U/L    ALT 29 10 - 44 U/L    eGFR >60.0 >60 mL/min/1.73 m^2    Anion Gap 8 8 - 16 mmol/L   CBC Auto Differential    Collection Time: 06/19/24  8:30 AM   Result Value Ref Range    WBC 3.94 3.90 - 12.70 K/uL    RBC 5.54 4.60 - 6.20 M/uL    Hemoglobin 14.8 14.0 - 18.0 g/dL    Hematocrit 45.7 40.0 - 54.0 %    MCV 83 82 - 98 fL    MCH 26.7 (L) 27.0 - 31.0 pg    MCHC 32.4 32.0 - 36.0 g/dL    RDW 14.5 11.5 - 14.5 %    Platelets 203 150 - 450 K/uL    MPV 12.0 9.2 - 12.9 fL    Immature Granulocytes 0.5 0.0 -  0.5 %    Gran # (ANC) 2.4 1.8 - 7.7 K/uL    Immature Grans (Abs) 0.02 0.00 - 0.04 K/uL    Lymph # 0.9 (L) 1.0 - 4.8 K/uL    Mono # 0.4 0.3 - 1.0 K/uL    Eos # 0.2 0.0 - 0.5 K/uL    Baso # 0.02 0.00 - 0.20 K/uL    nRBC 0 0 /100 WBC    Gran % 60.9 38.0 - 73.0 %    Lymph % 23.4 18.0 - 48.0 %    Mono % 9.4 4.0 - 15.0 %    Eosinophil % 5.3 0.0 - 8.0 %    Basophil % 0.5 0.0 - 1.9 %    Differential Method Automated    Hemoglobin A1C    Collection Time: 06/19/24  8:30 AM   Result Value Ref Range    Hemoglobin A1C 5.7 (H) 4.0 - 5.6 %    Estimated Avg Glucose 117 68 - 131 mg/dL   PSA, Screening    Collection Time: 06/19/24  8:30 AM   Result Value Ref Range    PSA, Screen 0.53 0.00 - 4.00 ng/mL   Testosterone    Collection Time: 06/19/24  8:30 AM   Result Value Ref Range    Testosterone, Total 404 304 - 1227 ng/dL   Sedimentation rate    Collection Time: 06/19/24  8:30 AM   Result Value Ref Range    Sed Rate <2 0 - 23 mm/Hr   C-reactive protein    Collection Time: 06/19/24  8:30 AM   Result Value Ref Range    CRP 2.0 0.0 - 8.2 mg/L   Rheumatoid factor    Collection Time: 06/19/24  8:30 AM   Result Value Ref Range    Rheumatoid Factor <13.0 0.0 - 15.0 IU/mL   Cyclic citrul peptide antibody, IgG    Collection Time: 06/19/24  8:30 AM   Result Value Ref Range    CCP Antibodies <0.5 <5.0 U/mL   POCT Urinalysis, Dipstick, Automated, W/O Scope    Collection Time: 08/14/24  8:07 AM   Result Value Ref Range    POC Blood, Urine Negative Negative    POC Bilirubin, Urine Negative Negative    POC Urobilinogen, Urine 0.2 (A) 0.3 - 2.2    POC Ketones, Urine Negative Negative    POC Protein, Urine Negative Negative    POC Nitrates, Urine Negative Negative    POC Glucose, Urine Negative Negative    pH, UA 5.0     POC Specific Gravity, Urine 1.020 1.003 - 1.029    POC Leukocytes, Urine Negative Negative   TESTOSTERONE PANEL    Collection Time: 08/14/24  8:30 AM   Result Value Ref Range    Testosterone 371 250 - 1100 ng/dL    Testosterone, Free  77.1 46.0 - 224.0 pg/mL    Testosterone, Bioavailable 151.9 ng/dL    Sex Hormone Binding Globulin 18 10 - 50 nmol/L    Albumin 4.3 3.6 - 5.1 g/dL   Estradiol    Collection Time: 08/14/24  8:30 AM   Result Value Ref Range    Estradiol 13 11 - 44 pg/mL   VITAMIN D, 25-HYDROXY    Collection Time: 09/23/24  8:44 AM   Result Value Ref Range    Vitamin D, 25-OH, Total 47.6 30.0 - 100.0 ng/mL   PSA, SCREENING    Collection Time: 09/23/24  8:44 AM   Result Value Ref Range    PSA Total 0.3 0.0 - 4.0 ng/mL   LIPID PANEL    Collection Time: 09/23/24  8:44 AM   Result Value Ref Range    Cholesterol 126 100 - 199 mg/dL    Triglycerides 120 0 - 149 mg/dL    HDL 38 (L) >39 mg/dL    VLDL Cholesterol Vu 22 5 - 40 mg/dL    LDL Calculated 66 0 - 99 mg/dL   HEMOGLOBIN A1C    Collection Time: 09/23/24  8:44 AM   Result Value Ref Range    Hemoglobin A1C 5.8 (H) 4.8 - 5.6 %   TSH    Collection Time: 09/23/24  8:44 AM   Result Value Ref Range    TSH 2.610 0.450 - 4.500 uIU/mL   CBC WITH DIFFERENTIAL    Collection Time: 09/23/24  8:44 AM   Result Value Ref Range    White Blood Cell Count 4.9 3.4 - 10.8 x10E3/uL    Red Blood Cell Count 5.75 4.14 - 5.80 x10E6/uL    Hemoglobin 15.3 13.0 - 17.7 g/dL    Hematocrit 48.4 37.5 - 51.0 %    Mean Corpuscular Volume 84 79 - 97 fL    MCH 26.6 26.6 - 33.0 pg    Mean Corpuscular Hemoglobin Conc 31.6 31.5 - 35.7 g/dL    RDW 15.2 11.6 - 15.4 %    Platelets 187 150 - 450 x10E3/uL    Neutrophils Relative 50 Not Estab. %    Lymphocytes % 33 Not Estab. %    Monocytes % 11 Not Estab. %    Eosinophils % 5 Not Estab. %    Baso, CSF 1 Not Estab. %    Neutrophils, Abs 2.4 1.4 - 7.0 x10E3/uL    Lymphocytes Absolute 1.6 0.7 - 3.1 x10E3/uL    Monocytes Absolute Count 0.5 0.1 - 0.9 x10E3/uL    Eosinophils, Absolute 0.3 0.0 - 0.4 x10E3/uL    Basophils, Absolute 0.0 0.0 - 0.2 x10E3/uL    Immature Granulocytes % 0 Not Estab. %    Immature Grans (Abs) 0.0 0.0 - 0.1 x10E3/uL         Assessment    1. Class 1 obesity with  serious comorbidity and body mass index (BMI) of 34.0 to 34.9 in adult, unspecified obesity type          Plan    Diagnoses and all orders for this visit:    Class 1 obesity with serious comorbidity and body mass index (BMI) of 34.0 to 34.9 in adult, unspecified obesity type  -     tirzepatide, weight loss, (ZEPBOUND) 2.5 mg/0.5 mL PnIj; Inject 2.5 mg into the skin every 7 days.    On screen, everything looks good.      Okay to use medication to help with weight loss.  Would prefer to avoid Contrave as he was already on Trintellix.  He is okay with trying injectables.      Prescription for ZepBound sent to the pharmacy.  PA completed, approved.      FOLLOW-UP:  Follow up in about 4 weeks (around 11/4/2024) for recheck.    The patient location is:  [place]  The chief complaint leading to consultation is:  Weight loss medicine    Visit type: audiovisual    Face to Face time with patient: 20 minutes    30 minutes of total time spent on the encounter, which includes face to face time and non-face to face time preparing to see the patient (eg, review of tests), Obtaining and/or reviewing separately obtained history, Documenting clinical information in the electronic or other health record, Independently interpreting results (not separately reported) and communicating results to the patient/family/caregiver, or Care coordination (not separately reported). Visit today included increased complexity associated with the care of the episodic problem addressed and managing the longitudinal care of the patient due to the serious and/or complex managed problem(s).    Each patient to whom he or she provides medical services by telemedicine is:  (1) informed of the relationship between the physician and patient and the respective role of any other health care provider with respect to management of the patient; and (2) notified that he or she may decline to receive medical services by telemedicine and may withdraw from such care  at any time.    Signed by:  Nito Oscar MD

## 2024-10-07 NOTE — PATIENT INSTRUCTIONS
We have several options for medications to help with weight loss.      Contrave (naltrexone-bupropion) is certainly an option.    Alternatively, we could try using ZepBound (tirzepatide) or Wegovy (semaglutide).  I am sending a prescription for ZepBound to the pharmacy today.  Let us see if your insurance will cover it.    You may also want to go to this website to see if you can download a savings card to save on the co-pay:  [email or web address]    This is a once a week injection.  When you fill the prescription if you have any questions about how to administer it, feel free to bring the medicine up here and we can walk you through the process.      I would like to see you back after you done your 4th injection.  This can be either in person or via video.  At that visit, we will check to see how you are doing, discuss dosing, etc..    Continue to eat a healthy diet.  Be careful with portion sizes.  Includes lots of fresh fruits, vegetables, whole grains, lean proteins.  See info below.    Keep hydrated.  Be sure to drink at least 8-10, 8 oz, glasses of water every day.    Stay active.  Try to do some sort of physical activity every day.  Nothing outrageous, just try walking for 10-15 minutes each day.

## 2024-11-18 ENCOUNTER — OFFICE VISIT (OUTPATIENT)
Dept: PRIMARY CARE CLINIC | Facility: CLINIC | Age: 49
End: 2024-11-18
Payer: COMMERCIAL

## 2024-11-18 VITALS
BODY MASS INDEX: 32.84 KG/M2 | HEIGHT: 71 IN | DIASTOLIC BLOOD PRESSURE: 80 MMHG | SYSTOLIC BLOOD PRESSURE: 118 MMHG | WEIGHT: 234.56 LBS | HEART RATE: 72 BPM

## 2024-11-18 DIAGNOSIS — E03.9 ACQUIRED HYPOTHYROIDISM: ICD-10-CM

## 2024-11-18 DIAGNOSIS — E78.1 HYPERTRIGLYCERIDEMIA: ICD-10-CM

## 2024-11-18 DIAGNOSIS — E78.2 MIXED HYPERLIPIDEMIA: ICD-10-CM

## 2024-11-18 DIAGNOSIS — F90.9 ATTENTION DEFICIT HYPERACTIVITY DISORDER (ADHD), UNSPECIFIED ADHD TYPE: ICD-10-CM

## 2024-11-18 DIAGNOSIS — F32.A ANXIETY AND DEPRESSION: ICD-10-CM

## 2024-11-18 DIAGNOSIS — E66.811 CLASS 1 OBESITY WITH SERIOUS COMORBIDITY AND BODY MASS INDEX (BMI) OF 34.0 TO 34.9 IN ADULT, UNSPECIFIED OBESITY TYPE: Primary | ICD-10-CM

## 2024-11-18 DIAGNOSIS — F41.9 ANXIETY AND DEPRESSION: ICD-10-CM

## 2024-11-18 PROCEDURE — 3079F DIAST BP 80-89 MM HG: CPT | Mod: CPTII,S$GLB,, | Performed by: FAMILY MEDICINE

## 2024-11-18 PROCEDURE — 3074F SYST BP LT 130 MM HG: CPT | Mod: CPTII,S$GLB,, | Performed by: FAMILY MEDICINE

## 2024-11-18 PROCEDURE — 3008F BODY MASS INDEX DOCD: CPT | Mod: CPTII,S$GLB,, | Performed by: FAMILY MEDICINE

## 2024-11-18 PROCEDURE — 99215 OFFICE O/P EST HI 40 MIN: CPT | Mod: S$GLB,,, | Performed by: FAMILY MEDICINE

## 2024-11-18 PROCEDURE — G2211 COMPLEX E/M VISIT ADD ON: HCPCS | Mod: S$GLB,,, | Performed by: FAMILY MEDICINE

## 2024-11-18 PROCEDURE — 1159F MED LIST DOCD IN RCRD: CPT | Mod: CPTII,S$GLB,, | Performed by: FAMILY MEDICINE

## 2024-11-18 PROCEDURE — 3044F HG A1C LEVEL LT 7.0%: CPT | Mod: CPTII,S$GLB,, | Performed by: FAMILY MEDICINE

## 2024-11-18 PROCEDURE — 99999 PR PBB SHADOW E&M-EST. PATIENT-LVL IV: CPT | Mod: PBBFAC,,, | Performed by: FAMILY MEDICINE

## 2024-11-18 RX ORDER — TIRZEPATIDE 5 MG/.5ML
5 INJECTION, SOLUTION SUBCUTANEOUS
Qty: 2 ML | Refills: 12 | Status: SHIPPED | OUTPATIENT
Start: 2024-11-18

## 2024-11-18 RX ORDER — DEXTROAMPHETAMINE SACCHARATE, AMPHETAMINE ASPARTATE, DEXTROAMPHETAMINE SULFATE AND AMPHETAMINE SULFATE 2.5; 2.5; 2.5; 2.5 MG/1; MG/1; MG/1; MG/1
1 TABLET ORAL 2 TIMES DAILY
Qty: 60 TABLET | Refills: 0 | Status: SHIPPED | OUTPATIENT
Start: 2024-12-18 | End: 2025-01-17

## 2024-11-18 RX ORDER — DEXTROAMPHETAMINE SACCHARATE, AMPHETAMINE ASPARTATE, DEXTROAMPHETAMINE SULFATE AND AMPHETAMINE SULFATE 2.5; 2.5; 2.5; 2.5 MG/1; MG/1; MG/1; MG/1
1 TABLET ORAL 2 TIMES DAILY
Qty: 60 TABLET | Refills: 0 | Status: SHIPPED | OUTPATIENT
Start: 2024-11-18 | End: 2024-12-18

## 2024-11-18 RX ORDER — DEXTROAMPHETAMINE SACCHARATE, AMPHETAMINE ASPARTATE, DEXTROAMPHETAMINE SULFATE AND AMPHETAMINE SULFATE 2.5; 2.5; 2.5; 2.5 MG/1; MG/1; MG/1; MG/1
1 TABLET ORAL 2 TIMES DAILY
Qty: 60 TABLET | Refills: 0 | Status: SHIPPED | OUTPATIENT
Start: 2025-01-17 | End: 2025-02-16

## 2024-11-18 NOTE — PATIENT INSTRUCTIONS
Physically, everything looks really good today.      Sounds like you are tolerating the ZepBound well.  On my scale, you are down 6 lbs!  Keep up the good work.      Let us go ahead and increase the dose to 5 mg weekly.  New prescription sent to pharmacy.  Let us plan to stay on this dose until our next visit.    Refills of your Adderall have also been sent to the pharmacy.  Continue using that, as needed.      Continue to eat a healthy diet.  Be careful with portion sizes.  Includes lots of fresh fruits, vegetables, whole grains, lean proteins.  See info below.    Keep hydrated.  Be sure to drink at least 8-10, 8 oz, glasses of water every day.    Stay active.  Try to do some sort of physical activity every day.  Nothing outrageous, just try walking for 10-15 minutes each day.

## 2024-11-18 NOTE — PROGRESS NOTES
"    [facility]       [address] [place] [address]      Phone: [phone]      Fax: [phone]    Nito Oscar MD                Office Visit  11/18/2024        Subjective      HPI:  Migue George is a 49 y.o. male presents today in clinic for "Follow-up  ."     49-year-old gentleman presents today to follow up on a couple of issues.    Has been struggling with weight loss.  Has been watching diet for 6+ months.  Watching portion sizes.  Making healthier choices.  Staying active.  Still was having trouble losing weight.  Last month, we started him on ZepBound 2.5 mg weekly.  No issues with the medication.  Noticed almost immediately that it helps curb appetite.  Made him feel full, sooner, longer.  Did four weeks worth.  After the last injection, feels like his appetite starting to come back a bit.  No nausea, vomiting.    Has ADHD.  Currently takes Adderall 10 mg, twice a day.  Generally only takes when he has to work.  Dose is working well for him.  Helps with focus, concentration.  Does not affect sleep, appetite.      Also has issues with stress, anxiety.  In the past, took Zoloft, Celexa.  Previous psychologist changed jobs.  Now, following with a psychiatric NP over at [facility].  They stopped the previous meds, started him Trintellix.  Doing well with it.  Supplements with trazodone at bedtime for sleep.    Has hypothyroidism.  Currently takes Synthroid 200 mcg daily.  No issues with this medication.      Also has elevated cholesterol/triglycerides levels.  Takes Lipitor 20 mg daily, along with Vascepa.  Tolerating these fine.    PMH: Diverticulitis, prediabetes, HLD, Hashimoto's/thyroid use, ADHD, anx/dep.  PSH: Colonoscopy June/2020, repeat in 3 years.  Scrotal vein surgery in 1995.  FMH: Dad-unknown.  Mom passed away from pancreatic cancer in her 50s, hypertension, thyroid issues, alcohol abuse.  Allergies: NKDA  Soc: Works at an " oil [employer], , has a son     T: meagan, quit 10+ yrs ago     A: seldom     D: denies     Psych: Brenden Mcdonald at [facility]  GI: Dr. Burciaga    Colon:  03/17/2023.  Repeat five years (2028)            The following were updated and reviewed by myself in the chart: medications, past medical history, past surgical history, family history, social history, and allergies.     Medications:  Current Outpatient Medications on File Prior to Visit   Medication Sig Dispense Refill    atorvastatin (LIPITOR) 20 MG tablet TAKE 1 TABLET DAILY 90 tablet 3    icosapent ethyL (VASCEPA) 1 gram Cap Take 2 capsules (2 g total) by mouth 2 (two) times daily. 360 capsule 3    ketoconazole (NIZORAL) 2 % cream Apply topically 2 (two) times daily. PRN dryness / flaking. Non-steroid. 60 g 1    ketoconazole (NIZORAL) 2 % shampoo Apply topically every 7 days. Shampoo scalp 1-2 times weekly. Lather x 5 minutes, then rinse well. 120 mL 11    levothyroxine (SYNTHROID) 200 MCG tablet Take 1 tablet (200 mcg total) by mouth before breakfast. 90 tablet 3    tadalafiL (CIALIS) 5 MG tablet Take 1 tablet (5 mg total) by mouth daily as needed for Erectile Dysfunction. 90 tablet 3    tirzepatide, weight loss, (ZEPBOUND) 2.5 mg/0.5 mL PnIj Inject 2.5 mg into the skin every 7 days. 2 mL 12    traZODone (DESYREL) 50 MG tablet Take 2 tablets (100 mg total) by mouth nightly as needed for Insomnia. 180 tablet 3    triamcinolone acetonide 0.025% (KENALOG) 0.025 % cream Apply topically 2 (two) times daily. PRN eczema flares of face. Mild steroid. 15 g 0    TRINTELLIX 10 mg Tab Take 1 tablet by mouth.      valACYclovir (VALTREX) 1000 MG tablet Take 2 tablets by mouth twice daily 30 tablet 1     No current facility-administered medications on file prior to visit.        PMHx:  Past Medical History:   Diagnosis Date    Anxiety     Depression     Diverticulitis     Hyperlipidemia       Patient Active Problem List    Diagnosis Date Noted    Diarrhea of presumed  infectious origin 2023    Hemorrhoids 2023    Diverticulosis 2023    History of colon polyps 2023    Blood in stool 2023    Hypothyroid 2015    Hyperlipidemia         PSHx:  Past Surgical History:   Procedure Laterality Date    COLONOSCOPY N/A 3/17/2023    Procedure: COLONOSCOPY;  Surgeon: Gregory Serrato MD;  Location: [facility];  Service: Endoscopy;  Laterality: N/A;    EXCISIONAL HEMORRHOIDECTOMY N/A 2024    Procedure: HEMORRHOIDECTOMY;  Surgeon: Gabriel Regalado MD;  Location: [facility] OR;  Service: General;  Laterality: N/A;    INJECTION OF ANESTHETIC AGENT AROUND PUDENDAL NERVE N/A 2024    Procedure: BLOCK, NERVE, PUDENDAL;  Surgeon: Gabriel Regalado MD;  Location: [facility] OR;  Service: General;  Laterality: N/A;    testicular vein ligation      VASECTOMY          FHx:  Family History   Problem Relation Name Age of Onset    Pancreatic cancer Mother      Diabetes Mother      Diabetes Unknown      Coronary artery disease Maternal Grandfather      Coronary artery disease Maternal Grandmother          Social:  Social History     Socioeconomic History    Marital status:     Number of children: 4   Occupational History    Occupation: [employer]      Employer: [employer]   Tobacco Use    Smoking status: Former     Current packs/day: 0.00     Types: Cigarettes     Quit date: 2004     Years since quittin.3    Smokeless tobacco: Never   Substance and Sexual Activity    Alcohol use: Yes     Alcohol/week: 0.8 standard drinks of alcohol     Types: 1 Standard drinks or equivalent per week    Drug use: No    Sexual activity: Yes        Allergies:  Review of patient's allergies indicates:  No Known Allergies     ROS:  Review of Systems   Constitutional:  Negative for activity change, appetite change, chills and fever.   HENT:  Negative for congestion, postnasal drip, rhinorrhea, sore throat and trouble swallowing.    Respiratory:   "Negative for cough and shortness of breath.    Cardiovascular:  Negative for chest pain and palpitations.   Gastrointestinal:  Negative for abdominal pain, constipation, diarrhea, nausea and vomiting.   Genitourinary:  Negative for difficulty urinating.   Musculoskeletal:  Negative for arthralgias and myalgias.   Skin:  Negative for color change and rash.   Neurological:  Negative for headaches.   All other systems reviewed and are negative.         Objective      /80   Pulse 72   Ht 5' 11" (1.803 m)   Wt 106.4 kg (234 lb 9.1 oz)   BMI 32.72 kg/m²   Ht Readings from Last 3 Encounters:   11/18/24 5' 11" (1.803 m)   08/14/24 5' 11" (1.803 m)   07/15/24 5' 11" (1.803 m)     Wt Readings from Last 3 Encounters:   11/18/24 106.4 kg (234 lb 9.1 oz)   08/14/24 109 kg (240 lb 4.8 oz)   07/15/24 113.2 kg (249 lb 9 oz)       PHYSICAL EXAM:  Physical Exam  Vitals and nursing note reviewed.   Constitutional:       General: He is not in acute distress.     Appearance: Normal appearance.   HENT:      Head: Normocephalic and atraumatic.      Right Ear: Tympanic membrane, ear canal and external ear normal.      Left Ear: Tympanic membrane, ear canal and external ear normal.      Nose: Nose normal. No congestion or rhinorrhea.      Mouth/Throat:      Mouth: Mucous membranes are moist.      Pharynx: Oropharynx is clear. No oropharyngeal exudate or posterior oropharyngeal erythema.   Eyes:      Extraocular Movements: Extraocular movements intact.      Conjunctiva/sclera: Conjunctivae normal.      Pupils: Pupils are equal, round, and reactive to light.   Cardiovascular:      Rate and Rhythm: Normal rate and regular rhythm.   Pulmonary:      Effort: Pulmonary effort is normal.      Breath sounds: No wheezing, rhonchi or rales.   Musculoskeletal:         General: Normal range of motion.      Cervical back: Normal range of motion.   Lymphadenopathy:      Cervical: No cervical adenopathy.   Skin:     General: Skin is warm and dry. "   Neurological:      General: No focal deficit present.      Mental Status: He is alert.              LABS / IMAGING:  Recent Results (from the past 26 weeks)   Lipid Panel    Collection Time: 06/19/24  8:30 AM   Result Value Ref Range    Cholesterol 142 120 - 199 mg/dL    Triglycerides 152 (H) 30 - 150 mg/dL    HDL 39 (L) 40 - 75 mg/dL    LDL Cholesterol 72.6 63.0 - 159.0 mg/dL    HDL/Cholesterol Ratio 27.5 20.0 - 50.0 %    Total Cholesterol/HDL Ratio 3.6 2.0 - 5.0    Non-HDL Cholesterol 103 mg/dL   TSH    Collection Time: 06/19/24  8:30 AM   Result Value Ref Range    TSH 1.569 0.400 - 4.000 uIU/mL   Comprehensive Metabolic Panel    Collection Time: 06/19/24  8:30 AM   Result Value Ref Range    Sodium 141 136 - 145 mmol/L    Potassium 4.7 3.5 - 5.1 mmol/L    Chloride 106 95 - 110 mmol/L    CO2 27 23 - 29 mmol/L    Glucose 102 70 - 110 mg/dL    BUN 11 6 - 20 mg/dL    Creatinine 1.0 0.5 - 1.4 mg/dL    Calcium 9.4 8.7 - 10.5 mg/dL    Total Protein 7.1 6.0 - 8.4 g/dL    Albumin 4.1 3.5 - 5.2 g/dL    Total Bilirubin 0.3 0.1 - 1.0 mg/dL    Alkaline Phosphatase 67 55 - 135 U/L    AST 19 10 - 40 U/L    ALT 29 10 - 44 U/L    eGFR >60.0 >60 mL/min/1.73 m^2    Anion Gap 8 8 - 16 mmol/L   CBC Auto Differential    Collection Time: 06/19/24  8:30 AM   Result Value Ref Range    WBC 3.94 3.90 - 12.70 K/uL    RBC 5.54 4.60 - 6.20 M/uL    Hemoglobin 14.8 14.0 - 18.0 g/dL    Hematocrit 45.7 40.0 - 54.0 %    MCV 83 82 - 98 fL    MCH 26.7 (L) 27.0 - 31.0 pg    MCHC 32.4 32.0 - 36.0 g/dL    RDW 14.5 11.5 - 14.5 %    Platelets 203 150 - 450 K/uL    MPV 12.0 9.2 - 12.9 fL    Immature Granulocytes 0.5 0.0 - 0.5 %    Gran # (ANC) 2.4 1.8 - 7.7 K/uL    Immature Grans (Abs) 0.02 0.00 - 0.04 K/uL    Lymph # 0.9 (L) 1.0 - 4.8 K/uL    Mono # 0.4 0.3 - 1.0 K/uL    Eos # 0.2 0.0 - 0.5 K/uL    Baso # 0.02 0.00 - 0.20 K/uL    nRBC 0 0 /100 WBC    Gran % 60.9 38.0 - 73.0 %    Lymph % 23.4 18.0 - 48.0 %    Mono % 9.4 4.0 - 15.0 %    Eosinophil % 5.3  0.0 - 8.0 %    Basophil % 0.5 0.0 - 1.9 %    Differential Method Automated    Hemoglobin A1C    Collection Time: 06/19/24  8:30 AM   Result Value Ref Range    Hemoglobin A1C 5.7 (H) 4.0 - 5.6 %    Estimated Avg Glucose 117 68 - 131 mg/dL   PSA, Screening    Collection Time: 06/19/24  8:30 AM   Result Value Ref Range    PSA, Screen 0.53 0.00 - 4.00 ng/mL   Testosterone    Collection Time: 06/19/24  8:30 AM   Result Value Ref Range    Testosterone, Total 404 304 - 1227 ng/dL   Sedimentation rate    Collection Time: 06/19/24  8:30 AM   Result Value Ref Range    Sed Rate <2 0 - 23 mm/Hr   C-reactive protein    Collection Time: 06/19/24  8:30 AM   Result Value Ref Range    CRP 2.0 0.0 - 8.2 mg/L   Rheumatoid factor    Collection Time: 06/19/24  8:30 AM   Result Value Ref Range    Rheumatoid Factor <13.0 0.0 - 15.0 IU/mL   Cyclic citrul peptide antibody, IgG    Collection Time: 06/19/24  8:30 AM   Result Value Ref Range    CCP Antibodies <0.5 <5.0 U/mL   POCT Urinalysis, Dipstick, Automated, W/O Scope    Collection Time: 08/14/24  8:07 AM   Result Value Ref Range    POC Blood, Urine Negative Negative    POC Bilirubin, Urine Negative Negative    POC Urobilinogen, Urine 0.2 (A) 0.3 - 2.2    POC Ketones, Urine Negative Negative    POC Protein, Urine Negative Negative    POC Nitrates, Urine Negative Negative    POC Glucose, Urine Negative Negative    pH, UA 5.0     POC Specific Gravity, Urine 1.020 1.003 - 1.029    POC Leukocytes, Urine Negative Negative   TESTOSTERONE PANEL    Collection Time: 08/14/24  8:30 AM   Result Value Ref Range    Testosterone 371 250 - 1100 ng/dL    Testosterone, Free 77.1 46.0 - 224.0 pg/mL    Testosterone, Bioavailable 151.9 ng/dL    Sex Hormone Binding Globulin 18 10 - 50 nmol/L    Albumin 4.3 3.6 - 5.1 g/dL   Estradiol    Collection Time: 08/14/24  8:30 AM   Result Value Ref Range    Estradiol 13 11 - 44 pg/mL   VITAMIN D, 25-HYDROXY    Collection Time: 09/23/24  8:44 AM   Result Value Ref Range     Vitamin D, 25-OH, Total 47.6 30.0 - 100.0 ng/mL   PSA, SCREENING    Collection Time: 09/23/24  8:44 AM   Result Value Ref Range    PSA Total 0.3 0.0 - 4.0 ng/mL   LIPID PANEL    Collection Time: 09/23/24  8:44 AM   Result Value Ref Range    Cholesterol 126 100 - 199 mg/dL    Triglycerides 120 0 - 149 mg/dL    HDL 38 (L) >39 mg/dL    VLDL Cholesterol Vu 22 5 - 40 mg/dL    LDL Calculated 66 0 - 99 mg/dL   HEMOGLOBIN A1C    Collection Time: 09/23/24  8:44 AM   Result Value Ref Range    Hemoglobin A1C 5.8 (H) 4.8 - 5.6 %   TSH    Collection Time: 09/23/24  8:44 AM   Result Value Ref Range    TSH 2.610 0.450 - 4.500 uIU/mL   CBC WITH DIFFERENTIAL    Collection Time: 09/23/24  8:44 AM   Result Value Ref Range    White Blood Cell Count 4.9 3.4 - 10.8 x10E3/uL    Red Blood Cell Count 5.75 4.14 - 5.80 x10E6/uL    Hemoglobin 15.3 13.0 - 17.7 g/dL    Hematocrit 48.4 37.5 - 51.0 %    Mean Corpuscular Volume 84 79 - 97 fL    MCH 26.6 26.6 - 33.0 pg    Mean Corpuscular Hemoglobin Conc 31.6 31.5 - 35.7 g/dL    RDW 15.2 11.6 - 15.4 %    Platelets 187 150 - 450 x10E3/uL    Neutrophils Relative 50 Not Estab. %    Lymphocytes % 33 Not Estab. %    Monocytes % 11 Not Estab. %    Eosinophils % 5 Not Estab. %    Baso, CSF 1 Not Estab. %    Neutrophils, Abs 2.4 1.4 - 7.0 x10E3/uL    Lymphocytes Absolute 1.6 0.7 - 3.1 x10E3/uL    Monocytes Absolute Count 0.5 0.1 - 0.9 x10E3/uL    Eosinophils, Absolute 0.3 0.0 - 0.4 x10E3/uL    Basophils, Absolute 0.0 0.0 - 0.2 x10E3/uL    Immature Granulocytes % 0 Not Estab. %    Immature Grans (Abs) 0.0 0.0 - 0.1 x10E3/uL         Assessment    1. Class 1 obesity with serious comorbidity and body mass index (BMI) of 34.0 to 34.9 in adult, unspecified obesity type    2. Attention deficit hyperactivity disorder (ADHD), unspecified ADHD type    3. Mixed hyperlipidemia    4. Hypertriglyceridemia    5. Acquired hypothyroidism    6. Anxiety and depression          Plan    Migue was seen today for  follow-up.    Diagnoses and all orders for this visit:    Class 1 obesity with serious comorbidity and body mass index (BMI) of 34.0 to 34.9 in adult, unspecified obesity type  -     tirzepatide, weight loss, (ZEPBOUND) 5 mg/0.5 mL PnIj; Inject 5 mg into the skin every 7 days.    Attention deficit hyperactivity disorder (ADHD), unspecified ADHD type  -     dextroamphetamine-amphetamine (ADDERALL) 10 mg Tab; Take 1 tablet (10 mg total) by mouth 2 (two) times a day.  -     dextroamphetamine-amphetamine (ADDERALL) 10 mg Tab; Take 1 tablet (10 mg total) by mouth 2 (two) times a day.  -     dextroamphetamine-amphetamine (ADDERALL) 10 mg Tab; Take 1 tablet (10 mg total) by mouth 2 (two) times a day.    Mixed hyperlipidemia    Hypertriglyceridemia    Acquired hypothyroidism    Anxiety and depression      Physically, everything looks great today.      Doing well on ZepBound.  Upon our scale, he has gone from 240 lb to 234 lb.  Okay to increase to 5 mg weekly.  PA completed.      Refill of Adderall.      Plan to repeat screening blood work in about six months.    FOLLOW-UP:  Follow up in about 3 months (around 2/18/2025) for med refill.    I spent a total of 40 minutes face to face and non-face to face on the date of this visit.This includes time preparing to see the patient (eg, review of tests, notes), obtaining and/or reviewing additional history from an independent historian and/or outside medical records, documenting clinical information in the electronic health record, independently interpreting results and/or communicating results to the patient/family/caregiver, or care coordinator.  Visit today included increased complexity associated with the care of the episodic problem addressed and managing the longitudinal care of the patient due to the serious and/or complex managed problem(s).    Signed by:  Nito Oscar MD

## 2024-12-18 ENCOUNTER — PATIENT MESSAGE (OUTPATIENT)
Dept: INTERNAL MEDICINE | Facility: CLINIC | Age: 49
End: 2024-12-18
Payer: COMMERCIAL

## 2025-02-18 ENCOUNTER — OFFICE VISIT (OUTPATIENT)
Dept: PRIMARY CARE CLINIC | Facility: CLINIC | Age: 50
End: 2025-02-18
Payer: COMMERCIAL

## 2025-02-18 VITALS
HEIGHT: 71 IN | SYSTOLIC BLOOD PRESSURE: 120 MMHG | DIASTOLIC BLOOD PRESSURE: 72 MMHG | HEART RATE: 66 BPM | OXYGEN SATURATION: 96 % | TEMPERATURE: 98 F | WEIGHT: 214.75 LBS | BODY MASS INDEX: 30.06 KG/M2

## 2025-02-18 DIAGNOSIS — E66.811 CLASS 1 OBESITY WITH SERIOUS COMORBIDITY AND BODY MASS INDEX (BMI) OF 34.0 TO 34.9 IN ADULT, UNSPECIFIED OBESITY TYPE: ICD-10-CM

## 2025-02-18 DIAGNOSIS — F41.9 ANXIETY AND DEPRESSION: ICD-10-CM

## 2025-02-18 DIAGNOSIS — F32.A ANXIETY AND DEPRESSION: ICD-10-CM

## 2025-02-18 DIAGNOSIS — F90.9 ATTENTION DEFICIT HYPERACTIVITY DISORDER (ADHD), UNSPECIFIED ADHD TYPE: Primary | ICD-10-CM

## 2025-02-18 DIAGNOSIS — E78.1 HYPERTRIGLYCERIDEMIA: ICD-10-CM

## 2025-02-18 DIAGNOSIS — E78.2 MIXED HYPERLIPIDEMIA: ICD-10-CM

## 2025-02-18 DIAGNOSIS — E03.9 ACQUIRED HYPOTHYROIDISM: ICD-10-CM

## 2025-02-18 DIAGNOSIS — N52.9 ERECTILE DYSFUNCTION, UNSPECIFIED ERECTILE DYSFUNCTION TYPE: ICD-10-CM

## 2025-02-18 RX ORDER — DEXTROAMPHETAMINE SACCHARATE, AMPHETAMINE ASPARTATE, DEXTROAMPHETAMINE SULFATE AND AMPHETAMINE SULFATE 2.5; 2.5; 2.5; 2.5 MG/1; MG/1; MG/1; MG/1
1 TABLET ORAL 2 TIMES DAILY
Qty: 60 TABLET | Refills: 0 | Status: SHIPPED | OUTPATIENT
Start: 2025-04-19 | End: 2025-05-19

## 2025-02-18 RX ORDER — TADALAFIL 5 MG/1
5 TABLET ORAL DAILY PRN
Qty: 90 TABLET | Refills: 3 | Status: SHIPPED | OUTPATIENT
Start: 2025-02-18 | End: 2026-02-18

## 2025-02-18 RX ORDER — DEXTROAMPHETAMINE SACCHARATE, AMPHETAMINE ASPARTATE, DEXTROAMPHETAMINE SULFATE AND AMPHETAMINE SULFATE 2.5; 2.5; 2.5; 2.5 MG/1; MG/1; MG/1; MG/1
1 TABLET ORAL 2 TIMES DAILY
Qty: 60 TABLET | Refills: 0 | Status: SHIPPED | OUTPATIENT
Start: 2025-03-20 | End: 2025-04-19

## 2025-02-18 RX ORDER — DEXTROAMPHETAMINE SACCHARATE, AMPHETAMINE ASPARTATE, DEXTROAMPHETAMINE SULFATE AND AMPHETAMINE SULFATE 2.5; 2.5; 2.5; 2.5 MG/1; MG/1; MG/1; MG/1
1 TABLET ORAL 2 TIMES DAILY
Qty: 60 TABLET | Refills: 0 | Status: SHIPPED | OUTPATIENT
Start: 2025-02-18 | End: 2025-03-20

## 2025-02-18 NOTE — PROGRESS NOTES
"    [facility]       [address] [place] [address]      Phone: [phone]      Fax: [phone]    Nito Oscar MD                Office Visit  02/18/2025        Subjective      HPI:  Migue George is a 49 y.o. male presents today in clinic for "Medication Refill  ."     49-year-old gentleman presents today to follow up on a couple of issues.    Still working on weight loss.  Watches diet, portion sizes.  Healthier choices, smaller portions.  Staying active.  Currently using ZepBound 5 mg weekly.  Lost 20 lb since last visit, almost 30 lb overall.  Tolerating it well.  Seems to be lasting 6-7 days.    Has ADHD.  Currently takes Adderall 10 mg, twice a day.  Generally only takes when he has to work.  Dose is working well for him.  Helps with focus, concentration.  Does not affect sleep, appetite.      Also has issues with stress, anxiety.  In the past, took Zoloft, Celexa.  Previous psychologist changed jobs.  Now, following with a psychiatric NP over at [facility].  They stopped the previous meds, started him Trintellix.  Was doing well with it, but insurance changed and no longer covers it.  Instead they have him on Viibryd.  Supplements with trazodone at bedtime for sleep.    Has hypothyroidism.  Currently takes Synthroid 200 mcg daily.  No issues with this medication.      Also has elevated cholesterol/triglycerides levels.  Takes Lipitor 20 mg daily, along with Vascepa.  Tolerating these fine.    PMH: Diverticulitis, prediabetes, HLD, Hashimoto's/thyroid use, ADHD, anx/dep.  PSH: Colonoscopy June/2020, repeat in 3 years.  Scrotal vein surgery in 1995.  FMH: Dad-unknown.  Mom passed away from pancreatic cancer in her 50s, hypertension, thyroid issues, alcohol abuse.  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 Burciaga    Colon:  2023.  Repeat five years ()            The following were updated and reviewed by myself in the chart: medications, past medical history, past surgical history, family history, social history, and allergies.     Medications:  Medications Ordered Prior to Encounter[1]     PMHx:  Past Medical History:   Diagnosis Date    Anxiety     Depression     Diverticulitis     Hyperlipidemia       Patient Active Problem List    Diagnosis Date Noted    Diarrhea of presumed infectious origin 2023    Hemorrhoids 2023    Diverticulosis 2023    History of colon polyps 2023    Blood in stool 2023    Hypothyroid 2015    Hyperlipidemia         PSHx:  Past Surgical History:   Procedure Laterality Date    COLONOSCOPY N/A 3/17/2023    Procedure: COLONOSCOPY;  Surgeon: Gregory Serrato MD;  Location: [facility];  Service: Endoscopy;  Laterality: N/A;    EXCISIONAL HEMORRHOIDECTOMY N/A 2024    Procedure: HEMORRHOIDECTOMY;  Surgeon: Gabriel Regalado MD;  Location: [facility] OR;  Service: General;  Laterality: N/A;    INJECTION OF ANESTHETIC AGENT AROUND PUDENDAL NERVE N/A 2024    Procedure: BLOCK, NERVE, PUDENDAL;  Surgeon: Gabriel Regalado MD;  Location: [facility] OR;  Service: General;  Laterality: N/A;    testicular vein ligation      VASECTOMY          FHx:  Family History   Problem Relation Name Age of Onset    Pancreatic cancer Mother      Diabetes Mother      Diabetes Unknown      Coronary artery disease Maternal Grandfather      Coronary artery disease Maternal Grandmother          Social:  Social History     Socioeconomic History    Marital status:     Number of children: 4   Occupational History    Occupation: refinery      Employer: [employer]   Tobacco Use    Smoking status: Former     Current packs/day: 0.00     Types: Cigarettes     Quit date: 2004     Years since quittin.5    Smokeless tobacco: Never   Substance  "and Sexual Activity    Alcohol use: Yes     Alcohol/week: 0.8 standard drinks of alcohol     Types: 1 Standard drinks or equivalent per week    Drug use: No    Sexual activity: Yes        Allergies:  Review of patient's allergies indicates:  No Known Allergies     ROS:  Review of Systems   Constitutional:  Negative for activity change, appetite change, chills and fever.   HENT:  Negative for congestion, postnasal drip, rhinorrhea, sore throat and trouble swallowing.    Respiratory:  Negative for cough and shortness of breath.    Cardiovascular:  Negative for chest pain and palpitations.   Gastrointestinal:  Negative for abdominal pain, constipation, diarrhea, nausea and vomiting.   Genitourinary:  Negative for difficulty urinating.   Musculoskeletal:  Negative for arthralgias and myalgias.   Skin:  Negative for color change and rash.   Neurological:  Negative for headaches.   All other systems reviewed and are negative.         Objective      /72 (BP Location: Left arm, Patient Position: Sitting)   Pulse 66   Temp 98 °F (36.7 °C) (Oral)   Ht 5' 11" (1.803 m)   Wt 97.4 kg (214 lb 11.7 oz)   SpO2 96%   BMI 29.95 kg/m²   Ht Readings from Last 3 Encounters:   02/18/25 5' 11" (1.803 m)   11/18/24 5' 11" (1.803 m)   08/14/24 5' 11" (1.803 m)     Wt Readings from Last 3 Encounters:   02/18/25 97.4 kg (214 lb 11.7 oz)   11/18/24 106.4 kg (234 lb 9.1 oz)   08/14/24 109 kg (240 lb 4.8 oz)       PHYSICAL EXAM:  Physical Exam  Vitals and nursing note reviewed.   Constitutional:       General: He is not in acute distress.     Appearance: Normal appearance.   HENT:      Head: Normocephalic and atraumatic.      Right Ear: Tympanic membrane, ear canal and external ear normal.      Left Ear: Tympanic membrane, ear canal and external ear normal.      Nose: Nose normal. No congestion or rhinorrhea.      Mouth/Throat:      Mouth: Mucous membranes are moist.      Pharynx: Oropharynx is clear. No oropharyngeal exudate or " posterior oropharyngeal erythema.   Eyes:      Extraocular Movements: Extraocular movements intact.      Conjunctiva/sclera: Conjunctivae normal.      Pupils: Pupils are equal, round, and reactive to light.   Cardiovascular:      Rate and Rhythm: Normal rate and regular rhythm.   Pulmonary:      Effort: Pulmonary effort is normal.      Breath sounds: No wheezing, rhonchi or rales.   Musculoskeletal:         General: Normal range of motion.      Cervical back: Normal range of motion.   Lymphadenopathy:      Cervical: No cervical adenopathy.   Skin:     General: Skin is warm and dry.   Neurological:      General: No focal deficit present.      Mental Status: He is alert.              LABS / IMAGING:  Recent Results (from the past 26 weeks)   VITAMIN D, 25-HYDROXY    Collection Time: 09/23/24  8:44 AM   Result Value Ref Range    Vitamin D, 25-OH, Total 47.6 30.0 - 100.0 ng/mL   PSA, SCREENING    Collection Time: 09/23/24  8:44 AM   Result Value Ref Range    PSA Total 0.3 0.0 - 4.0 ng/mL   LIPID PANEL    Collection Time: 09/23/24  8:44 AM   Result Value Ref Range    Cholesterol 126 100 - 199 mg/dL    Triglycerides 120 0 - 149 mg/dL    HDL 38 (L) >39 mg/dL    VLDL Cholesterol Vu 22 5 - 40 mg/dL    LDL Calculated 66 0 - 99 mg/dL   HEMOGLOBIN A1C    Collection Time: 09/23/24  8:44 AM   Result Value Ref Range    Hemoglobin A1C 5.8 (H) 4.8 - 5.6 %   TSH    Collection Time: 09/23/24  8:44 AM   Result Value Ref Range    TSH 2.610 0.450 - 4.500 uIU/mL   CBC WITH DIFFERENTIAL    Collection Time: 09/23/24  8:44 AM   Result Value Ref Range    White Blood Cell Count 4.9 3.4 - 10.8 x10E3/uL    Red Blood Cell Count 5.75 4.14 - 5.80 x10E6/uL    Hemoglobin 15.3 13.0 - 17.7 g/dL    Hematocrit 48.4 37.5 - 51.0 %    Mean Corpuscular Volume 84 79 - 97 fL    MCH 26.6 26.6 - 33.0 pg    Mean Corpuscular Hemoglobin Conc 31.6 31.5 - 35.7 g/dL    RDW 15.2 11.6 - 15.4 %    Platelets 187 150 - 450 x10E3/uL    Neutrophils Relative 50 Not Estab. %     Lymphocytes % 33 Not Estab. %    Monocytes % 11 Not Estab. %    Eosinophils % 5 Not Estab. %    Baso, CSF 1 Not Estab. %    Neutrophils, Abs 2.4 1.4 - 7.0 x10E3/uL    Lymphocytes Absolute 1.6 0.7 - 3.1 x10E3/uL    Monocytes Absolute Count 0.5 0.1 - 0.9 x10E3/uL    Eosinophils, Absolute 0.3 0.0 - 0.4 x10E3/uL    Basophils, Absolute 0.0 0.0 - 0.2 x10E3/uL    Immature Granulocytes % 0 Not Estab. %    Immature Grans (Abs) 0.0 0.0 - 0.1 x10E3/uL         Assessment    1. Attention deficit hyperactivity disorder (ADHD), unspecified ADHD type    2. Acquired hypothyroidism    3. Mixed hyperlipidemia    4. Hypertriglyceridemia    5. Anxiety and depression    6. Class 1 obesity with serious comorbidity and body mass index (BMI) of 34.0 to 34.9 in adult, unspecified obesity type    7. Erectile dysfunction, unspecified erectile dysfunction type          Dimple Camarena was seen today for medication refill.    Diagnoses and all orders for this visit:    Attention deficit hyperactivity disorder (ADHD), unspecified ADHD type  -     dextroamphetamine-amphetamine (ADDERALL) 10 mg Tab; Take 1 tablet (10 mg total) by mouth 2 (two) times a day.  -     dextroamphetamine-amphetamine (ADDERALL) 10 mg Tab; Take 1 tablet (10 mg total) by mouth 2 (two) times a day.  -     dextroamphetamine-amphetamine (ADDERALL) 10 mg Tab; Take 1 tablet (10 mg total) by mouth 2 (two) times a day.    Acquired hypothyroidism    Mixed hyperlipidemia    Hypertriglyceridemia    Anxiety and depression    Class 1 obesity with serious comorbidity and body mass index (BMI) of 34.0 to 34.9 in adult, unspecified obesity type    Erectile dysfunction, unspecified erectile dysfunction type  -     tadalafiL (CIALIS) 5 MG tablet; Take 1 tablet (5 mg total) by mouth daily as needed for Erectile Dysfunction.    Physically, everything looks really good today.      Reviewed labs done last September.  We can plan to repeat them this September, around his birthday.  We can place  orders at next visit.      Refills as above.      Doing well on ZepBound 5 mg.  Recommended we stay on this dose for the next three months.  If he notices cravings coming back after only four or five days, he will let us know and we can increase the dose early, if needed.      FOLLOW-UP:  Follow up in about 3 months (around 5/18/2025) for med refill.    I spent a total of 40 minutes face to face and non-face to face on the date of this visit.This includes time preparing to see the patient (eg, review of tests, notes), obtaining and/or reviewing additional history from an independent historian and/or outside medical records, documenting clinical information in the electronic health record, independently interpreting results and/or communicating results to the patient/family/caregiver, or care coordinator.  Visit today included increased complexity associated with the care of the episodic problem addressed and managing the longitudinal care of the patient due to the serious and/or complex managed problem(s).    Signed by:  Nito Oscar MD       [1]   Current Outpatient Medications on File Prior to Visit   Medication Sig Dispense Refill    atorvastatin (LIPITOR) 20 MG tablet TAKE 1 TABLET DAILY 90 tablet 3    icosapent ethyL (VASCEPA) 1 gram Cap Take 2 capsules (2 g total) by mouth 2 (two) times daily. 360 capsule 3    ketoconazole (NIZORAL) 2 % cream Apply topically 2 (two) times daily. PRN dryness / flaking. Non-steroid. 60 g 1    ketoconazole (NIZORAL) 2 % shampoo Apply topically every 7 days. Shampoo scalp 1-2 times weekly. Lather x 5 minutes, then rinse well. 120 mL 11    levothyroxine (SYNTHROID) 200 MCG tablet TAKE 1 TABLET BEFORE BREAKFAST 90 tablet 3    tirzepatide, weight loss, (ZEPBOUND) 2.5 mg/0.5 mL PnIj Inject 2.5 mg into the skin every 7 days. 2 mL 12    tirzepatide, weight loss, (ZEPBOUND) 5 mg/0.5 mL PnIj Inject 5 mg into the skin every 7 days. 2 mL 12    traZODone (DESYREL) 50 MG tablet Take 2  tablets (100 mg total) by mouth nightly as needed for Insomnia. 180 tablet 3    triamcinolone acetonide 0.025% (KENALOG) 0.025 % cream Apply topically 2 (two) times daily. PRN eczema flares of face. Mild steroid. 15 g 0    TRINTELLIX 10 mg Tab Take 1 tablet by mouth.      valACYclovir (VALTREX) 1000 MG tablet Take 2 tablets by mouth twice daily 30 tablet 1    [DISCONTINUED] tadalafiL (CIALIS) 5 MG tablet Take 1 tablet (5 mg total) by mouth daily as needed for Erectile Dysfunction. 90 tablet 3     No current facility-administered medications on file prior to visit.

## 2025-02-18 NOTE — PATIENT INSTRUCTIONS
Congratulations on the weight loss! You are doing quite well.    Let us stay on the 5 mg dose of ZepBound for another three months.  Between now and our next visit, if you find that the shot only lasts about four days, or less, please let me know and we can send a higher dose sooner.  Ideally, though, I would like to stretch it out another three months.      Continue to eat a healthy diet.  Be careful with portion sizes.  Includes lots of fresh fruits, vegetables, whole grains, lean proteins.  See info below.    Keep hydrated.  Be sure to drink at least 8-10, 8 oz, glasses of water every day.    Stay active.  Try to do some sort of physical activity every day.  Nothing outrageous, just try walking for 10-15 minutes each day.     Refills of your Adderall has been sent to the pharmacy.  Please continue taking them, as directed.      I sent refills of Cialis (tadalafil) to [facility].  You should get notification from them soon.    Let us plan to do some screening blood work at the visit after our next one.  Next visit I will put orders in for that.

## 2025-03-28 ENCOUNTER — PATIENT MESSAGE (OUTPATIENT)
Dept: PRIMARY CARE CLINIC | Facility: CLINIC | Age: 50
End: 2025-03-28
Payer: COMMERCIAL

## 2025-03-28 DIAGNOSIS — F41.9 ANXIETY AND DEPRESSION: Primary | ICD-10-CM

## 2025-03-28 DIAGNOSIS — F32.A ANXIETY AND DEPRESSION: Primary | ICD-10-CM

## 2025-04-08 ENCOUNTER — PATIENT MESSAGE (OUTPATIENT)
Dept: SURGERY | Facility: CLINIC | Age: 50
End: 2025-04-08
Payer: COMMERCIAL

## 2025-04-08 RX ORDER — HYDROCORTISONE 25 MG/G
CREAM TOPICAL 2 TIMES DAILY
Qty: 28 G | Refills: 0 | Status: SHIPPED | OUTPATIENT
Start: 2025-04-08 | End: 2025-04-18

## 2025-04-30 ENCOUNTER — PATIENT MESSAGE (OUTPATIENT)
Dept: PRIMARY CARE CLINIC | Facility: CLINIC | Age: 50
End: 2025-04-30
Payer: COMMERCIAL

## 2025-04-30 DIAGNOSIS — E66.811 CLASS 1 OBESITY WITH SERIOUS COMORBIDITY AND BODY MASS INDEX (BMI) OF 34.0 TO 34.9 IN ADULT, UNSPECIFIED OBESITY TYPE: Primary | ICD-10-CM

## 2025-04-30 RX ORDER — TIRZEPATIDE 7.5 MG/.5ML
7.5 INJECTION, SOLUTION SUBCUTANEOUS
Qty: 2 ML | Refills: 12 | Status: SHIPPED | OUTPATIENT
Start: 2025-04-30

## 2025-06-20 ENCOUNTER — OFFICE VISIT (OUTPATIENT)
Dept: PRIMARY CARE CLINIC | Facility: CLINIC | Age: 50
End: 2025-06-20
Payer: COMMERCIAL

## 2025-06-20 VITALS
SYSTOLIC BLOOD PRESSURE: 110 MMHG | WEIGHT: 218.06 LBS | HEIGHT: 71 IN | DIASTOLIC BLOOD PRESSURE: 76 MMHG | HEART RATE: 84 BPM | BODY MASS INDEX: 30.53 KG/M2

## 2025-06-20 DIAGNOSIS — R73.03 PREDIABETES: ICD-10-CM

## 2025-06-20 DIAGNOSIS — E78.2 MIXED HYPERLIPIDEMIA: Primary | ICD-10-CM

## 2025-06-20 DIAGNOSIS — E78.1 HYPERTRIGLYCERIDEMIA: ICD-10-CM

## 2025-06-20 DIAGNOSIS — E66.811 CLASS 1 OBESITY WITH SERIOUS COMORBIDITY AND BODY MASS INDEX (BMI) OF 34.0 TO 34.9 IN ADULT, UNSPECIFIED OBESITY TYPE: ICD-10-CM

## 2025-06-20 DIAGNOSIS — Z12.5 PROSTATE CANCER SCREENING: ICD-10-CM

## 2025-06-20 DIAGNOSIS — R79.89 LOW TESTOSTERONE: ICD-10-CM

## 2025-06-20 DIAGNOSIS — E03.9 ACQUIRED HYPOTHYROIDISM: ICD-10-CM

## 2025-06-20 PROCEDURE — 99999 PR PBB SHADOW E&M-EST. PATIENT-LVL III: CPT | Mod: PBBFAC,,, | Performed by: PHYSICIAN ASSISTANT

## 2025-06-20 RX ORDER — ICOSAPENT ETHYL 1 G/1
2 CAPSULE ORAL 2 TIMES DAILY
Qty: 360 CAPSULE | Refills: 3 | Status: SHIPPED | OUTPATIENT
Start: 2025-06-20

## 2025-06-20 RX ORDER — VILAZODONE HYDROCHLORIDE 20 MG/1
20 TABLET ORAL
COMMUNITY
Start: 2025-07-15 | End: 2026-07-10

## 2025-06-20 RX ORDER — TIRZEPATIDE 7.5 MG/.5ML
7.5 INJECTION, SOLUTION SUBCUTANEOUS
Qty: 6 ML | Refills: 3 | Status: SHIPPED | OUTPATIENT
Start: 2025-06-20

## 2025-06-20 NOTE — PROGRESS NOTES
"    [facility]       [address] [place] [address]      Phone: [phone]      Fax: [phone]    Barbara Du PA-C                Office Visit  06/20/2025        Subjective      HPI:  Migue George is a 49 y.o. male presents today in clinic for "Medication Refill  ."     CHIEF COMPLAINT:  Patient presents for medication refills and to schedule annual bloodwork.    HPI:  Patient is on Zepbound 7.5 mg for weight loss. He has lost approximately 30-40 lbs since starting the medication, with his weight decreasing from 249 lbs in July of last year to 218 lbs today. He had mild prediabetes previously, which may have improved with Zepbound use.  Denies any medication side effects.  Tolerating well.  He expresses a desire to become more physically active, considering starting a running routine. He also reports taking fish oil supplements, which he needs to refill.    [facility]ing [facility] day [facility]    Patient would also like his labs placed so he can complete his yearly screening.      He denies any acute complaints today.  Overall feeling well.    Medication Refill  Pertinent negatives include no abdominal pain, arthralgias, chest pain, headaches, myalgias, numbness, rash or weakness.             The following were updated and reviewed by myself in the chart: medications, past medical history, past surgical history, family history, social history, and allergies.     Medications:  Medications Ordered Prior to Encounter[1]     PMHx:  Past Medical History:   Diagnosis Date    Anxiety     Depression     Diverticulitis     Hyperlipidemia       Patient Active Problem List    Diagnosis Date Noted    Diarrhea of presumed infectious origin 01/18/2023    Hemorrhoids 01/18/2023    Diverticulosis 01/18/2023    History of colon polyps 01/18/2023    Blood in stool 01/18/2023    Hypothyroid 03/27/2015    Hyperlipidemia         PSHx:  Past " Surgical History:   Procedure Laterality Date    COLONOSCOPY N/A 3/17/2023    Procedure: COLONOSCOPY;  Surgeon: Gregory Serrato MD;  Location: [facility];  Service: Endoscopy;  Laterality: N/A;    EXCISIONAL HEMORRHOIDECTOMY N/A 2024    Procedure: HEMORRHOIDECTOMY;  Surgeon: Gabriel Regalado MD;  Location: [facility] OR;  Service: General;  Laterality: N/A;    INJECTION OF ANESTHETIC AGENT AROUND PUDENDAL NERVE N/A 2024    Procedure: BLOCK, NERVE, PUDENDAL;  Surgeon: Gabriel Regalado MD;  Location: [facility] OR;  Service: General;  Laterality: N/A;    testicular vein ligation      VASECTOMY          FHx:  Family History   Problem Relation Name Age of Onset    Pancreatic cancer Mother      Diabetes Mother      Diabetes Unknown      Coronary artery disease Maternal Grandfather      Coronary artery disease Maternal Grandmother          Social:  Social History     Socioeconomic History    Marital status:     Number of children: 4   Occupational History    Occupation: refinery      Employer: [employer]   Tobacco Use    Smoking status: Former     Current packs/day: 0.00     Types: Cigarettes     Quit date: 2004     Years since quittin.9    Smokeless tobacco: Never   Substance and Sexual Activity    Alcohol use: Yes     Alcohol/week: 0.8 standard drinks of alcohol     Types: 1 Standard drinks or equivalent per week    Drug use: No    Sexual activity: Yes        Allergies:  Review of patient's allergies indicates:  No Known Allergies     ROS:  Review of Systems   Constitutional:  Negative for activity change, appetite change and unexpected weight change.   HENT:  Negative for hearing loss and trouble swallowing.    Respiratory:  Negative for shortness of breath.    Cardiovascular:  Negative for chest pain.   Gastrointestinal:  Negative for abdominal pain, constipation and diarrhea.   Musculoskeletal:  Negative for arthralgias and myalgias.   Skin:  Negative for rash.  "  Neurological:  Negative for dizziness, tremors, seizures, speech difficulty, weakness, light-headedness, numbness and headaches.   Psychiatric/Behavioral:  Negative for sleep disturbance. The patient is not nervous/anxious.           Objective      /76   Pulse 84   Ht 5' 11" (1.803 m)   Wt 98.9 kg (218 lb 0.6 oz)   BMI 30.41 kg/m²   Ht Readings from Last 3 Encounters:   06/20/25 5' 11" (1.803 m)   02/18/25 5' 11" (1.803 m)   11/18/24 5' 11" (1.803 m)     Wt Readings from Last 3 Encounters:   06/20/25 98.9 kg (218 lb 0.6 oz)   02/18/25 97.4 kg (214 lb 11.7 oz)   11/18/24 106.4 kg (234 lb 9.1 oz)       PHYSICAL EXAM:  Physical Exam  Constitutional:       General: He is not in acute distress.     Appearance: Normal appearance. He is not ill-appearing.   Cardiovascular:      Rate and Rhythm: Normal rate and regular rhythm.   Pulmonary:      Effort: Pulmonary effort is normal. No respiratory distress.      Breath sounds: Normal breath sounds.   Musculoskeletal:      Cervical back: Normal range of motion.   Neurological:      General: No focal deficit present.      Mental Status: He is alert and oriented to person, place, and time.            Assessment    1. Mixed hyperlipidemia    2. Class 1 obesity with serious comorbidity and body mass index (BMI) of 34.0 to 34.9 in adult, unspecified obesity type    3. Hypertriglyceridemia    4. Acquired hypothyroidism    5. Low testosterone    6. Prediabetes    7. Prostate cancer screening          Plan    Migue was seen today for medication refill.    Diagnoses and all orders for this visit:    Mixed hyperlipidemia  -     CBC Auto Differential; Future  -     Comprehensive Metabolic Panel; Future  -     Lipid Panel; Future    Class 1 obesity with serious comorbidity and body mass index (BMI) of 34.0 to 34.9 in adult, unspecified obesity type  -     ZEPBOUND 7.5 mg/0.5 mL PnIj; Inject 7.5 mg into the skin every 7 days.    Hypertriglyceridemia  -     icosapent ethyL " (VASCEPA) 1 gram Cap; Take 2 capsules (2 g total) by mouth 2 (two) times daily.    Acquired hypothyroidism    Low testosterone  -     TSH; Future  -     TESTOSTERONE; Future    Prediabetes  -     Hemoglobin A1C; Future    Prostate cancer screening  -     PSA, Screening; Future     PLAN SUMMARY:   Annual labs ordered to be completed 1 week prior to next appointment   Prescribed Zepbound 7.5 mg for 3 months   Continue fish oil 2 g twice daily    Recommend incorporating resistance training into exercise routine   Suggested increasing physical activity, including running   Encouraged including protein and fiber-rich foods in diet   Next appointment to evaluate metabolic status and review lab results    PREDIABETES MANAGEMENT:   Monitored patient's prediabetes history, noting significant weight loss progress from 249 lbs in July last year to current 218 lbs, which may indicate improvement in prediabetic status.   Patient is hopeful for continued improvement with Zepbound therapy.    LIFESTYLE AND DIETARY RECOMMENDATIONS:   Recommend incorporating resistance training into exercise routine to preserve muscle mass while on GLP/GIP analog medications, even if using bands or lighter weights with high repetitions.   Patient should aim for protein intake of approximately 100-120 g per day.   Encouraged including fiber-rich foods in diet for gut and bowel health.   Suggested incorporating running or increased physical activity for overall health benefits.      FOLLOW-UP:  Follow up in about 3 months (around 9/20/2025) for With Dr. Oscar.    I spent a total of 30 minutes face to face and non-face to face on the date of this visit.This includes time preparing to see the patient (eg, review of tests, notes), obtaining and/or reviewing additional history from an independent historian and/or outside medical records, documenting clinical information in the electronic health record, independently interpreting results and/or  communicating results to the patient/family/caregiver, or care coordinator.  Visit today included increased complexity associated with the care of the episodic problem addressed and managing the longitudinal care of the patient due to the serious and/or complex managed problem(s).      Signed by:        Barbara Du PA-C      This note was generated with the assistance of ambient listening technology. Verbal consent was obtained by the patient and accompanying visitor(s) for the recording of patient appointment to facilitate this note. I attest to having reviewed and edited the generated note for accuracy, though some syntax or spelling errors may persist. Please contact the author of this note for any clarification.         [1]   Current Outpatient Medications on File Prior to Visit   Medication Sig Dispense Refill    atorvastatin (LIPITOR) 20 MG tablet TAKE 1 TABLET DAILY 90 tablet 3    ketoconazole (NIZORAL) 2 % cream Apply topically 2 (two) times daily. PRN dryness / flaking. Non-steroid. 60 g 1    ketoconazole (NIZORAL) 2 % shampoo Apply topically every 7 days. Shampoo scalp 1-2 times weekly. Lather x 5 minutes, then rinse well. 120 mL 11    levothyroxine (SYNTHROID) 200 MCG tablet TAKE 1 TABLET BEFORE BREAKFAST 90 tablet 3    tadalafiL (CIALIS) 5 MG tablet Take 1 tablet (5 mg total) by mouth daily as needed for Erectile Dysfunction. 90 tablet 3    traZODone (DESYREL) 50 MG tablet Take 2 tablets (100 mg total) by mouth nightly as needed for Insomnia. 180 tablet 3    triamcinolone acetonide 0.025% (KENALOG) 0.025 % cream Apply topically 2 (two) times daily. PRN eczema flares of face. Mild steroid. 15 g 0    valACYclovir (VALTREX) 1000 MG tablet Take 2 tablets by mouth twice daily 30 tablet 1    [START ON 7/15/2025] vilazodone (VIIBRYD) 20 mg Tab Take 20 mg by mouth.      [DISCONTINUED] icosapent ethyL (VASCEPA) 1 gram Cap Take 2 capsules (2 g total) by mouth 2 (two) times daily. 360 capsule 3     [DISCONTINUED] TRINTELLIX 10 mg Tab Take 1 tablet by mouth.      [DISCONTINUED] ZEPBOUND 7.5 mg/0.5 mL PnIj Inject 7.5 mg into the skin every 7 days. 2 mL 12    dextroamphetamine-amphetamine (ADDERALL) 10 mg Tab Take 1 tablet (10 mg total) by mouth 2 (two) times a day. 60 tablet 0     No current facility-administered medications on file prior to visit.

## 2025-06-20 NOTE — PATIENT INSTRUCTIONS
Tips for overall health and wellness:     - DIET -- fresh fruits (plenty of berries -- strawberries, blueberries, blackberries, raspberries -- full of antioxidants), fresh seasonal vegetables (organic when possible to minimize pesticide exposure), whole grains (avoid refined carbohydrates), lean meats (grass fed organic meats if possible), avoid processed meats (deli meat, sausage, minimize pork). MINIMIZE processed foods and fast food eating. AVOID sugary drinks -- sodas, diet drinks, artificially sweetened drinks.   YOU SHOULD AIM  G PROTEIN A DAY WHILE ON GLP1 MEDICATIONS FOR WEIGHT LOSS.     - HYDRATE -- plenty of water and daily electrolytes.     - SUNLIGHT -- Get sunlight almost daily. Best to go out in the morning from 7-10 AM or in the evening. This gives you natural vitamin D and the suns healing rays.     - MOVEMENT -- Move daily. Regular exercise and movement in any manner that works best for your schedule and daily practice. Be sure to do some sort of resistance training to maintain/build muscle mass -- muscle is key in longevity and helps us to age well.     - DETOXIFY -- Minimize toxic load. Eliminate pesticides, plastics, and household chemical exposures. Be sure you maintain a healthy gut and liver. Sweating is also our bodies way to detox.     - STRESS -- Manage stress through prayer, meditation, exercise, journaling, socialization, support groups, etc.     - SLEEP -- Healthy sleep is so important. Develop a healthy bed time routine. Aim for 7-8 hours of sleep nightly. Sleep is your time when the body detoxifies it self.

## 2025-06-21 ENCOUNTER — PATIENT MESSAGE (OUTPATIENT)
Dept: PRIMARY CARE CLINIC | Facility: CLINIC | Age: 50
End: 2025-06-21
Payer: COMMERCIAL

## 2025-06-21 DIAGNOSIS — B00.1 FEVER BLISTER: ICD-10-CM

## 2025-06-23 RX ORDER — VALACYCLOVIR HYDROCHLORIDE 1 G/1
2000 TABLET, FILM COATED ORAL 2 TIMES DAILY
Qty: 30 TABLET | Refills: 1 | Status: SHIPPED | OUTPATIENT
Start: 2025-06-23

## 2025-06-23 NOTE — TELEPHONE ENCOUNTER
Refill Routing Note   Medication(s) are not appropriate for processing by [facility] for the following reason(s):        Non-participating provider    [facility] action(s):  Route               Appointments  past 12m or future 3m with PCP    Date Provider   Last Visit   6/20/2025 Barbara Du PA-C   Next Visit   Visit date not found Barbara Du PA-C   ED visits in past 90 days: 0        Note composed:9:26 AM 06/23/2025

## 2025-07-01 ENCOUNTER — PATIENT MESSAGE (OUTPATIENT)
Dept: PRIMARY CARE CLINIC | Facility: CLINIC | Age: 50
End: 2025-07-01
Payer: COMMERCIAL

## 2025-07-21 DIAGNOSIS — E78.2 MIXED HYPERLIPIDEMIA: ICD-10-CM

## 2025-07-21 RX ORDER — ATORVASTATIN CALCIUM 20 MG/1
20 TABLET, FILM COATED ORAL
Qty: 90 TABLET | Refills: 3 | Status: SHIPPED | OUTPATIENT
Start: 2025-07-21

## 2025-07-21 NOTE — TELEPHONE ENCOUNTER
Refill Routing Note   Medication(s) are not appropriate for processing by [facility] for the following reason(s):        Required labs outdated    [facility] action(s):  Defer      Medication Therapy Plan: FLOS      Appointments  past 12m or future 3m with PCP    Date Provider   Last Visit   2/18/2025 Nito Oscar MD   Next Visit   9/10/2025 Nito Oscar MD   ED visits in past 90 days: 0        Note composed:8:01 AM 07/21/2025

## 2025-07-21 NOTE — TELEPHONE ENCOUNTER
Care Due:                  Date            Visit Type   Department     Provider  --------------------------------------------------------------------------------                                EP -                              PRIMARY      [facility] PRIMARY  Last Visit: 02-      CARE ([facility])   JESSIE Oscar                              EP -                              PRIMARY      [facility] PRIMARY  Next Visit: 09-      CARE ([facility])   JESSIE Oscar                                                            Last  Test          Frequency    Reason                     Performed    Due Date  --------------------------------------------------------------------------------    CMP.........  12 months..  atorvastatin.............  06- 06-    Lipid Panel.  12 months..  atorvastatin.............  06- 06-    [employer] Embedded Care Due Messages. Reference number: [id number].   7/21/2025 1:13:18 AM CDT

## (undated) DEVICE — SUT CTD VICRYL VIL BR UR-6

## (undated) DEVICE — SUT VICRYL 3-0 27 SH

## (undated) DEVICE — GLOVE SIGNATURE ESSNTL LTX 8

## (undated) DEVICE — JELLY SURGILUBE LUBE PKT 3GM

## (undated) DEVICE — PACK BASIC SETUP SC BR

## (undated) DEVICE — SUT VICRYL+ 2-0 UR6 27 VIOL

## (undated) DEVICE — GOWN POLY REINF BRTH SLV XL

## (undated) DEVICE — PAD ABDOMINAL STERILE 8X10IN

## (undated) DEVICE — SUT CHROMIC 3-0 SH 27IN GUT

## (undated) DEVICE — DISSECTOR LIGASURE EXACT 21CM

## (undated) DEVICE — TOWEL OR DISP STRL BLUE 4/PK

## (undated) DEVICE — SPONGE COTTON TRAY 4X4IN

## (undated) DEVICE — ELECTRODE REM PLYHSV RETURN 9

## (undated) DEVICE — GLOVE SENSICARE PI GRN 8

## (undated) DEVICE — PANTIES FEMININE NAPKIN LG/XLG

## (undated) DEVICE — CONTAINER SPECIMEN OR STER 4OZ

## (undated) DEVICE — COVER LIGHT HANDLE 80/CA

## (undated) DEVICE — SOL NORMAL USPCA 0.9%

## (undated) DEVICE — SYR LUER LOCK STERILE 10ML

## (undated) DEVICE — MANIFOLD 4 PORT